# Patient Record
Sex: FEMALE | Race: BLACK OR AFRICAN AMERICAN | NOT HISPANIC OR LATINO | Employment: UNEMPLOYED | URBAN - METROPOLITAN AREA
[De-identification: names, ages, dates, MRNs, and addresses within clinical notes are randomized per-mention and may not be internally consistent; named-entity substitution may affect disease eponyms.]

---

## 2020-01-17 LAB
ABO + RH BLD: NORMAL
C TRACH RRNA SPEC QL PROBE: POSITIVE
N GONORRHOEAE, AMPLIFIED DNA: POSITIVE

## 2020-02-04 LAB
HCT VFR BLD AUTO: 37 % (ref 36–46)
HGB BLD-MCNC: 12.2 G/DL (ref 12–16)
MCV RBC AUTO: 78.2 FL (ref 82–108)
PLATELET # BLD AUTO: 78 K/?L (ref 150–399)

## 2020-02-05 LAB
HBV SURFACE AG SERPL QL IA: NEGATIVE
HEMOGLOBIN BANDS: NORMAL
HIV 1+2 AB+HIV1 P24 AG SERPL QL IA: NEGATIVE
RPR: NORMAL
RUBELLA IMMUNE STATUS: NORMAL

## 2020-02-18 DIAGNOSIS — O30.001 TWIN GESTATION IN FIRST TRIMESTER, UNSPECIFIED MULTIPLE GESTATION TYPE: Primary | ICD-10-CM

## 2020-03-03 ENCOUNTER — INITIAL CONSULT (OUTPATIENT)
Dept: MATERNAL FETAL MEDICINE | Facility: CLINIC | Age: 25
End: 2020-03-03
Payer: MEDICAID

## 2020-03-03 ENCOUNTER — PROCEDURE VISIT (OUTPATIENT)
Dept: MATERNAL FETAL MEDICINE | Facility: CLINIC | Age: 25
End: 2020-03-03
Payer: MEDICAID

## 2020-03-03 VITALS — SYSTOLIC BLOOD PRESSURE: 90 MMHG | WEIGHT: 92.56 LBS | DIASTOLIC BLOOD PRESSURE: 52 MMHG

## 2020-03-03 DIAGNOSIS — Z20.828 EXPOSURE TO THE FLU: Primary | ICD-10-CM

## 2020-03-03 DIAGNOSIS — Z36.82 ENCOUNTER FOR NUCHAL TRANSLUCENCY TESTING: ICD-10-CM

## 2020-03-03 DIAGNOSIS — O30.041 DICHORIONIC DIAMNIOTIC TWIN PREGNANCY IN FIRST TRIMESTER: ICD-10-CM

## 2020-03-03 DIAGNOSIS — O30.001 TWIN GESTATION IN FIRST TRIMESTER, UNSPECIFIED MULTIPLE GESTATION TYPE: ICD-10-CM

## 2020-03-03 PROCEDURE — 99212 OFFICE O/P EST SF 10 MIN: CPT | Mod: PBBFAC,TH | Performed by: OBSTETRICS & GYNECOLOGY

## 2020-03-03 PROCEDURE — 76802 PR US, OB <14WKS, TRANSABD, EA ADDL GESTATION: ICD-10-PCS | Mod: 26,S$PBB,, | Performed by: OBSTETRICS & GYNECOLOGY

## 2020-03-03 PROCEDURE — 99499 UNLISTED E&M SERVICE: CPT | Mod: S$PBB,,, | Performed by: OBSTETRICS & GYNECOLOGY

## 2020-03-03 PROCEDURE — 99999 PR PBB SHADOW E&M-EST. PATIENT-LVL II: ICD-10-PCS | Mod: PBBFAC,,, | Performed by: OBSTETRICS & GYNECOLOGY

## 2020-03-03 PROCEDURE — 99999 PR PBB SHADOW E&M-EST. PATIENT-LVL II: CPT | Mod: PBBFAC,,, | Performed by: OBSTETRICS & GYNECOLOGY

## 2020-03-03 PROCEDURE — 76801 OB US < 14 WKS SINGLE FETUS: CPT | Mod: PBBFAC | Performed by: OBSTETRICS & GYNECOLOGY

## 2020-03-03 PROCEDURE — 76802 OB US < 14 WKS ADDL FETUS: CPT | Mod: PBBFAC | Performed by: OBSTETRICS & GYNECOLOGY

## 2020-03-03 PROCEDURE — 76801 OB US < 14 WKS SINGLE FETUS: CPT | Mod: 26,S$PBB,, | Performed by: OBSTETRICS & GYNECOLOGY

## 2020-03-03 PROCEDURE — 76801 PR US, OB <14WKS, TRANSABD, SINGLE GESTATION: ICD-10-PCS | Mod: 26,S$PBB,, | Performed by: OBSTETRICS & GYNECOLOGY

## 2020-03-03 PROCEDURE — 76802 OB US < 14 WKS ADDL FETUS: CPT | Mod: 26,S$PBB,, | Performed by: OBSTETRICS & GYNECOLOGY

## 2020-03-03 PROCEDURE — 99499 NO LOS: ICD-10-PCS | Mod: S$PBB,,, | Performed by: OBSTETRICS & GYNECOLOGY

## 2020-03-03 RX ORDER — OSELTAMIVIR PHOSPHATE 75 MG/1
75 CAPSULE ORAL DAILY
Qty: 10 CAPSULE | Refills: 0 | Status: SHIPPED | OUTPATIENT
Start: 2020-03-03 | End: 2020-03-13

## 2020-03-03 NOTE — LETTER
March 4, 2020      Bryson Reid NP  [address]  [address]  [facility] [address]           [facility] [address]  [address]  [facility] [address]  Phone: [phone]          Patient: Fany May   MR Number: [id number]   YOB: 1995   Date of Visit: 3/3/2020       Dear Bryson Reid:    Thank you for referring Fany May to me for evaluation. Attached you will find relevant portions of my assessment and plan of care.    If you have questions, please do not hesitate to call me. I look forward to following Fany May along with you.    Sincerely,    Bharti Seaman MD    Enclosure  CC:  No Recipients    If you would like to receive this communication electronically, please contact [email or web address] or [phone] to request more information on [employer] access.    For providers and/or their staff who would like to refer a patient to [facility], please contact us through our one-stop-shop provider referral line, [facility] , at [phone].    If you feel you have received this communication in error or would no longer like to receive these types of communications, please e-mail [email or web address]

## 2020-03-18 ENCOUNTER — PROCEDURE VISIT (OUTPATIENT)
Dept: MATERNAL FETAL MEDICINE | Facility: CLINIC | Age: 25
End: 2020-03-18
Payer: MEDICAID

## 2020-03-18 ENCOUNTER — INITIAL CONSULT (OUTPATIENT)
Dept: MATERNAL FETAL MEDICINE | Facility: CLINIC | Age: 25
End: 2020-03-18
Payer: MEDICAID

## 2020-03-18 VITALS
BODY MASS INDEX: 18.48 KG/M2 | HEIGHT: 60 IN | SYSTOLIC BLOOD PRESSURE: 102 MMHG | WEIGHT: 94.13 LBS | DIASTOLIC BLOOD PRESSURE: 60 MMHG

## 2020-03-18 DIAGNOSIS — O30.041 DICHORIONIC DIAMNIOTIC TWIN PREGNANCY IN FIRST TRIMESTER: ICD-10-CM

## 2020-03-18 DIAGNOSIS — Z36.82 ENCOUNTER FOR NUCHAL TRANSLUCENCY TESTING: ICD-10-CM

## 2020-03-18 DIAGNOSIS — Z36.89 ENCOUNTER FOR FETAL ANATOMIC SURVEY: Primary | ICD-10-CM

## 2020-03-18 PROCEDURE — 76815 OB US LIMITED FETUS(S): CPT | Mod: 26,S$PBB,, | Performed by: OBSTETRICS & GYNECOLOGY

## 2020-03-18 PROCEDURE — 99204 OFFICE O/P NEW MOD 45 MIN: CPT | Mod: 25,S$PBB,TH, | Performed by: OBSTETRICS & GYNECOLOGY

## 2020-03-18 PROCEDURE — 76815 PR  US,PREGNANT UTERUS,LIMITED, 1/> FETUSES: ICD-10-PCS | Mod: 26,S$PBB,, | Performed by: OBSTETRICS & GYNECOLOGY

## 2020-03-18 PROCEDURE — 99212 OFFICE O/P EST SF 10 MIN: CPT | Mod: PBBFAC,TH,25 | Performed by: OBSTETRICS & GYNECOLOGY

## 2020-03-18 PROCEDURE — 99999 PR PBB SHADOW E&M-EST. PATIENT-LVL II: ICD-10-PCS | Mod: PBBFAC,,, | Performed by: OBSTETRICS & GYNECOLOGY

## 2020-03-18 PROCEDURE — 99999 PR PBB SHADOW E&M-EST. PATIENT-LVL II: CPT | Mod: PBBFAC,,, | Performed by: OBSTETRICS & GYNECOLOGY

## 2020-03-18 PROCEDURE — 99204 PR OFFICE/OUTPT VISIT, NEW, LEVL IV, 45-59 MIN: ICD-10-PCS | Mod: 25,S$PBB,TH, | Performed by: OBSTETRICS & GYNECOLOGY

## 2020-03-18 PROCEDURE — 76815 OB US LIMITED FETUS(S): CPT | Mod: PBBFAC | Performed by: OBSTETRICS & GYNECOLOGY

## 2020-03-18 NOTE — PROGRESS NOTES
Indication  ========    FHTs : Di/Di twins    History  ======    Previous Outcomes   2  Para 1  Preg. no. 1  Outcome: live birth  Date: 18  Gest. age 39 w + 0 d  Gender: female  Details:   Risk Factors  Details: CMV Igg positive  Details: Sickle cell trait positive    Maternal Assessment  =================    BP syst 102 mmHg  BP diast 60 mmHg    Method  ======    Transabdominal ultrasound examination, [place] E10. View: Sufficient    Pregnancy  =========    Twin pregnancy. Dichorionic-diamniotic. Number of fetuses: 2    Dating  ======    GA by prior assessment 13 w + 3 d  ELLIOT by prior assessment: 2020  Assigned: based on stated ELLIOT, selected on 03/3/2020  Assigned GA 13 w + 3 d  Assigned ELLIOT: 2020  Pregnancy length 280 d    General Evaluation (Fetus 1)  =====================    Cardiac activity present.  bpm.  Fetal movements visualized.  Placenta anterior.    Fetal Anatomy (Fetus 1)  ==================    Cranium: normal  Stomach: visualized  Legs: both visualized    General Evaluation (Fetus 2)  =====================    Cardiac activity present.  bpm.  Fetal movements visualized.  Placenta posterior.    Fetal Anatomy (Fetus 2)  ==================    Cranium: normal  Stomach: visualized  Legs: both visualized    Consultation  ==========    Type: Twin gestation  The patient is a 24-year-old soon to be 25-year-old  001 who is currently at 13 weeks and 3 days gestation based on a first-trimester  ultrasound who presents for consultation due to di/di twins. The patient was initially supposed to be seen for consultation a few weeks ago but  her child had the flu. The patient indicates her child has recovered and she has no symptoms. She took Tamiflu per Dr. Seaman's note.    Past medical history: Questions sickle cell trait, need to verify no thalassemia  past surgical history: She reports none  no known drug allergies  medications: Prenatal vitamins  social history: Denies  smoking, alcohol, illicit drug use  family history of birth defects: None, she reports the father of the baby is reportedly negative for sickle cell trait but no records were provided,  she does not believe he has been tested, she reports her father had sickle cell disease and  many years ago, she does not report any  personal history of sickle cell disease or sickle cell thalassemia, she reported a transfusion with a postpartum hemorrhage with her last  delivery  past OB history: Full-term vaginal delivery 8 lb 12 oz, she reports she had pneumonia with blood loss and blood pressures were high at the end  of her pregnancy no records were provided the time of consultation regarding this; Patient indicates she was told she should have had a   with her prior delivery.      Hgb 11.9, plt 200, sickle cell screen positive, electrophoresis reports normal hgb electrophoretic pattern but elevated hgba2 an elevated hgba2  can be seen in thalassemia but the actual report shows hgb S  cr 0.64, ast 12, alt 14  A1c 5.2  varicella non-immune  low vitamin D  cmv igg positive    A/P:  1. IUP at 13 and 3    2. Di/Di twin gestation: :I counseled the patient with the risks associated with twin pregnancy. These include but are not limited to   labor and  delivery,gestational diabetes, preeclampsia, IUGR and/or discordant twins, malpresentation, congenital anomalies, postpartum hemorrhage,  demise, and  delivery. The patient voiced understanding of these risks. We discussed that a twin gestation increases the risk of  nearly all  pregnancy related complications. There is an increased risk of cerebral palsy in multiple gestations. A detailed fetal anatomic  survey is recommended at 19 to 20 weeks gestation. Subsequent to this, serial growth ultrasounds are recommended every 4-6 weeks  provided  fetal growth remains normal.  Weekly  fetal surveillance is recommended at 32 weeks with either BPP or  NST/MVP through primary ob .If discordancy (difference  in  EFW's > 20%) or other complications develop, then more intense  fetal surveillance should be performed.  Given the multiple gestation, I would recommend folic acid supplementation. Recommend at least an additional 1mg daily and primary ob  may  wish to consider a dosage of 4mg total daily.  To reduce the risk of preeclampsia, I would recommend baby aspirin 81mg PO daily after 12-14 weeks of gestation.  Delivery at 38 weeks or earlier if indicated.    Genetic screening in twins: Aneuploidy risks reviewed. If the twins are dizygous that her age related risk is the risk for each fetus but that the  total aneuploidy risk for the mother is higher due to the multiple fetuses. Monozygous twins  most often have the same chromosomal complement and the risk of carrying aneuploid fetuses is similar to the age risk of the mother. Twins  of  discordant gender are typically dizygous. We discussed the option of sequential screening. We reviewed second trimester screening  detecting  about 50% of cases of Down syndrome in twins. The patient was also informed of the options of diagnostic testing for aneuploidy via CVS  and  amniocentesis and the risks associated with this in twins vs in collazo pregnancies. We discussed that ACOG does not recommend that  NIPT be performed on twins given the limited data. The patient currently declines aneuploidy screening or CVS or amniocentesis. She  indicated she will review this again with her primary ob.    patient advised of increased risk of adverse pregnancy outcomes with low pre pregnancy weight. Recommend primary OB monitor making  appropriately.    Also noted:    3. The patient reportedly has sickle cell trait. On her hemoglobin electrophoresis it appeared that she did have hemoglobin S that is usually  consistent with sickle cell trait. Her sickle cell screen was positive but the electrophoresis reports a normal  hemoglobin electrophoresis pattern  with elevated hgba2 and can be seen in thalassemia trait. I attempted to contact the lab where the referring physicians sent the  electrophoresis. I was unable to get anyone to assist me via the phone but they said that they would address this and will send a corrected  report to the referring physician. I would recommend referring physician verify if the corrected hemoglobin electrophoresis says that she has  sickle cell trait along with beta thalassemia or if they can not believe it sickle cell trait alone. I had counseled the patient about sickle cell trait  as she reports that she was diagnosed with this in the past. She does not believe that the father of the baby has sickle cell trait but we have no  documentation of this. The patient is aware that if she is sickle cell trait positive that each of her children have a 50% risk of having sickle cell  trait. If the father of the baby also has sickle cell trait along with the patient, each offspring would have a 25% risk of sickle cell disease. If the  patient has sickle cell beta thalassemia that alters her counseling risk and also recommendations for pregnancy. I offered the patient the option  of having a hemoglobin electrophoresis through the [facility] system today and she declined. If the primary obstetrician is unable to obtain a  corrected electrophoresis report, I recommend that the primary OB send the hemoglobin electrophoresis to another laboratory. Please refer  back for MFM consultation if the patient has evidence of sickle cell beta thalassemia as this alters the counseling as well as the  recommendations for the pregnancy and send a copy of the report. With sickle cell trait, urine cultures should be checked every trimester by  the primary obstetrician. The primary OB should notify the pediatricians at delivery of the patient's hemoglobinopathy status so as to inform  them of the need for testing of the neonates. The  patient also indicated that her father had sickle cell disease but he passed away. She does  not recall any sickle cell thalassemia in the family but will discuss this with her family. She did not report any pain crises herself. She did  report having a blood transfusion with her prior delivery due to postpartum bleeding. Primary OB should offer the father of the baby hemoglobin  electrophoresis. The patient declines diagnostic testing for the pregnancy via CVS or amniocentesis to assess for sickle cell disease.    4. Primary OB should address vitamin-D levels. Primary OB check CMV IgG for unclear reasons. Recommend primary OB consider checking  CMV IgM if there are any concerns of exposure. Primary OB should address chlamydia and appropriate retesting. Patient discussed delivery  planning with her primary OB.    Addendum: The patient did report that she had high blood pressure at the end of her last pregnancy. Recommend primary OB check a set of  baseline preeclampsia labs (done) as well as a baseline urine protein to creatinine ratio. I did not have any records of her pregnancy at the time  of her consultation and consultation was not requested regarding her prior pregnancy. Limited records were able to be found post visit. Primary  ob should obtain detailed records. The patient is discussing route of delivery with her primary obstetricians. After her visit, I was able to find  some information in Care everywhere that documented that the patient had hypoxia with pneumonia after delivery and also had a cervical  laceration with repair and blood transfusion (4 units with 2 units FFP due to cervical laceration) and thrombocytopenia and anemia. She had an  increased creatinine which resolved. There is no documentation of a hemoglobin electrophoresis. They did not feel this was cardiogenic her left  atrium was slightly enlarged but her EF was normal and her PA pressure was normal. She was placed on antibiotics and  treated. Recommend  primary OB review her records and if there are any questions ask MFM to rediscuss this as this not requested the initial consultation and  these were not available at the time of her consultation. Records also indicated childhood asthma, the patient did not reveal this. The patient  did not indicate any issues with aspirin and I verified this with her via phone. Primary ob should also repeat maternal echo to confirm this is  normal. in her records there is no documentation of the sickle cell status that I could find. Primary OB also need to address delivery route given  the cervical laceration in which she reports was a weight of her primary prior child. we do not have exact details of the laceration or a copy of  the note of repair but if the laceration was extensive the primary OB should discuss  section with patient.    45 min was spent face-to-face time with greater than half that time spent counseling and coordination of care.    Impression  =========    Live dichorionic/diamniotic twin intrauterine pregnancy.  Limited ultrasound to verify cardiac activity.  Cardiac activity noted for both fetuses.    Recommendation  ==============    Follow up ultrasound in 6 weeks for fetal anatomic survey (patient aware cervical length is done at that time).  Recommend primary ob confirm that lab corrects hgb electrophoresis results and verify if there are any concerns regarding thalassemia. If  unable to get amended report, recommend primary ob send the electrophoresis to another lab. Patient declined a repeat electrophoresis at  [facility].  Recommend primary ob reconsult MFM if any concerns of patient having more than sickle cell trait alone after obtaining corrected testing or  repeat results or if any concerns on prior delivery records. If patient has concurrent thalassemia, additional recommendations would be  warranted.  Primary ob should offer FOB hgb electrophoresis.  Baby asa daily and  folic acid.  Serial growth ultrasounds.  Primary ob should do urine cultures every trimester.  Primary ob to rediscuss genetic screening for aneuploidy-patient declines currently.  3rd trimester  fetal surveillance.  Delivery at 38 weeks or earlier if indicated.  Recommend primary ob see note above regarding prenatal care issues to address and addendum above for recommendations as well-primary  ob should obtain prior pregnancy records and also check maternal echo. primary ob to address delivery route.  See note above.

## 2020-04-30 ENCOUNTER — PROCEDURE VISIT (OUTPATIENT)
Dept: MATERNAL FETAL MEDICINE | Facility: CLINIC | Age: 25
End: 2020-04-30
Payer: MEDICAID

## 2020-04-30 ENCOUNTER — INITIAL CONSULT (OUTPATIENT)
Dept: MATERNAL FETAL MEDICINE | Facility: CLINIC | Age: 25
End: 2020-04-30
Payer: MEDICAID

## 2020-04-30 VITALS
HEIGHT: 60 IN | SYSTOLIC BLOOD PRESSURE: 98 MMHG | WEIGHT: 103.19 LBS | BODY MASS INDEX: 20.26 KG/M2 | DIASTOLIC BLOOD PRESSURE: 58 MMHG

## 2020-04-30 DIAGNOSIS — Z36.89 ENCOUNTER FOR FETAL ANATOMIC SURVEY: ICD-10-CM

## 2020-04-30 DIAGNOSIS — O99.019 SICKLE CELL TRAIT IN MOTHER AFFECTING PREGNANCY: ICD-10-CM

## 2020-04-30 DIAGNOSIS — Z36.89 ENCOUNTER FOR ULTRASOUND TO ASSESS FETAL GROWTH: Primary | ICD-10-CM

## 2020-04-30 DIAGNOSIS — O30.042 DICHORIONIC DIAMNIOTIC TWIN PREGNANCY IN SECOND TRIMESTER: Primary | ICD-10-CM

## 2020-04-30 DIAGNOSIS — D57.3 SICKLE CELL TRAIT IN MOTHER AFFECTING PREGNANCY: ICD-10-CM

## 2020-04-30 DIAGNOSIS — D57.3 SICKLE CELL TRAIT: Primary | ICD-10-CM

## 2020-04-30 LAB — QUAD SCREEN: NEGATIVE

## 2020-04-30 PROCEDURE — 99213 PR OFFICE/OUTPT VISIT, EST, LEVL III, 20-29 MIN: ICD-10-PCS | Mod: S$PBB,TH,25, | Performed by: OBSTETRICS & GYNECOLOGY

## 2020-04-30 PROCEDURE — 99213 OFFICE O/P EST LOW 20 MIN: CPT | Mod: PBBFAC,TH | Performed by: OBSTETRICS & GYNECOLOGY

## 2020-04-30 PROCEDURE — 99999 PR PBB SHADOW E&M-EST. PATIENT-LVL III: ICD-10-PCS | Mod: PBBFAC,,, | Performed by: OBSTETRICS & GYNECOLOGY

## 2020-04-30 PROCEDURE — 99999 PR PBB SHADOW E&M-EST. PATIENT-LVL III: CPT | Mod: PBBFAC,,, | Performed by: OBSTETRICS & GYNECOLOGY

## 2020-04-30 PROCEDURE — 76812 OB US DETAILED ADDL FETUS: CPT | Mod: 26,S$PBB,, | Performed by: OBSTETRICS & GYNECOLOGY

## 2020-04-30 PROCEDURE — 76812 OB US DETAILED ADDL FETUS: CPT | Mod: PBBFAC | Performed by: OBSTETRICS & GYNECOLOGY

## 2020-04-30 PROCEDURE — 76811 OB US DETAILED SNGL FETUS: CPT | Mod: PBBFAC | Performed by: OBSTETRICS & GYNECOLOGY

## 2020-04-30 PROCEDURE — 76811 PR US, OB FETAL EVAL & EXAM, TRANSABDOM,FIRST GESTATION: ICD-10-PCS | Mod: 26,S$PBB,, | Performed by: OBSTETRICS & GYNECOLOGY

## 2020-04-30 PROCEDURE — 76812 OB US DETAILED ADDL FETUS: ICD-10-PCS | Mod: 26,S$PBB,, | Performed by: OBSTETRICS & GYNECOLOGY

## 2020-04-30 PROCEDURE — 99213 OFFICE O/P EST LOW 20 MIN: CPT | Mod: S$PBB,TH,25, | Performed by: OBSTETRICS & GYNECOLOGY

## 2020-04-30 PROCEDURE — 76811 OB US DETAILED SNGL FETUS: CPT | Mod: 26,S$PBB,, | Performed by: OBSTETRICS & GYNECOLOGY

## 2020-04-30 NOTE — PROGRESS NOTES
Please see imaging tab for Viewpoint study performed today      Pt here for targeted US and Follow up MD visit  Primary OB:   [place]   Pt here for FU visit.  Targeted US performed.  Time spent with pt: 15 mins, >50% spent in counseling alone re US findings, PTL precs, weight gain, and Hgb electrophoresis.      1.   Di/Di twin gestation: :  Previously counseled   Aneuploidy screening discussed   Folic acid and LDA recommended   Targeted ultrasound today, serial ultrasounds for growth monthly thereafter    testing at 32 weeks   Mode of delivery at Primary OB discretion   Cervical length assessed today  And normal        2.  Sickle trait :  Previously counseled re sickle trait.  UC recommended q trimester.  Inheritance pattern previously discussed and pt counseled re potential for infant to have sickle trait/disease depending upon status of the FOB.   testing was reviewed.  Hgb electrophoresis was recommended to confirm no evidence of Sickle-B thalassemia.   Review of records and contact of Primary's office reveals that a hgb electrophoresis was not repeated.   Patient previously declined CVS or amniocentesis to determine fetal sickle status.  . The patient reportedly has sickle cell trait and she believes the FOB is negative . On her hemoglobin electrophoresis it appeared that she did have hemoglobin S that is usually  consistent with sickle cell trait.     3.  Vit D:  Level check and supplementation recommended if needed.  Primary to address.     4.  History of PP HTN:  Baseline labs recommended with CMP, CBC and PC ratio    5.  Prior PPH secondary to extensive cervical laceration:  Primary aware and to determine route of delivery.  Generally, patients who have had a cervical laceration can delivery vaginally in subsequent pregnancies. Mode of delivery will depend upon presentation of twins and other obstetric factors as well.     Recommendations:    Targeted US performed today  Office called and a  repeat HgbE was not done; one was ordered and the pt went to lab today --will forward to primary   Primay MD to prescribe folic acid and LDA  UC q TM   Genetic screening per Primary MD   Recommend ultrasound q 4 weeks to follow fetal growth -- a return appointment was scheduled today   Delivery at 38 weeks, unless indicated earlier   Shirin Barbour MD

## 2020-05-28 ENCOUNTER — PROCEDURE VISIT (OUTPATIENT)
Dept: MATERNAL FETAL MEDICINE | Facility: CLINIC | Age: 25
End: 2020-05-28
Payer: MEDICAID

## 2020-05-28 DIAGNOSIS — O30.042 DICHORIONIC DIAMNIOTIC TWIN PREGNANCY IN SECOND TRIMESTER: ICD-10-CM

## 2020-05-28 DIAGNOSIS — Z36.89 ENCOUNTER FOR ULTRASOUND TO ASSESS FETAL GROWTH: ICD-10-CM

## 2020-05-28 PROCEDURE — 76816 OB US FOLLOW-UP PER FETUS: CPT | Mod: 59,PBBFAC | Performed by: OBSTETRICS & GYNECOLOGY

## 2020-05-28 PROCEDURE — 76816 PR  US,PREGNANT UTERUS,F/U,TRANSABD APP: ICD-10-PCS | Mod: 26,S$PBB,, | Performed by: OBSTETRICS & GYNECOLOGY

## 2020-05-28 PROCEDURE — 76816 OB US FOLLOW-UP PER FETUS: CPT | Mod: 26,S$PBB,, | Performed by: OBSTETRICS & GYNECOLOGY

## 2020-06-07 ENCOUNTER — HOSPITAL ENCOUNTER (EMERGENCY)
Facility: OTHER | Age: 25
Discharge: HOME | End: 2020-06-07
Attending: OBSTETRICS & GYNECOLOGY
Payer: MEDICAID

## 2020-06-07 VITALS
HEART RATE: 89 BPM | RESPIRATION RATE: 16 BRPM | TEMPERATURE: 99 F | OXYGEN SATURATION: 98 % | DIASTOLIC BLOOD PRESSURE: 59 MMHG | SYSTOLIC BLOOD PRESSURE: 98 MMHG

## 2020-06-07 DIAGNOSIS — Z3A.25 25 WEEKS GESTATION OF PREGNANCY: Primary | ICD-10-CM

## 2020-06-07 DIAGNOSIS — O26.899 PELVIC PRESSURE IN PREGNANCY: ICD-10-CM

## 2020-06-07 DIAGNOSIS — R10.2 PELVIC PRESSURE IN PREGNANCY: ICD-10-CM

## 2020-06-07 PROCEDURE — 99283 PR EMERGENCY DEPT VISIT,LEVEL III: ICD-10-PCS | Mod: 25,,, | Performed by: OBSTETRICS & GYNECOLOGY

## 2020-06-07 PROCEDURE — 99284 EMERGENCY DEPT VISIT MOD MDM: CPT | Mod: 25

## 2020-06-07 PROCEDURE — 99283 EMERGENCY DEPT VISIT LOW MDM: CPT | Mod: 25,,, | Performed by: OBSTETRICS & GYNECOLOGY

## 2020-06-07 PROCEDURE — 59025 PR FETAL 2N-STRESS TEST: ICD-10-PCS | Mod: 26,,, | Performed by: OBSTETRICS & GYNECOLOGY

## 2020-06-07 PROCEDURE — 59025 FETAL NON-STRESS TEST: CPT | Mod: 26,,, | Performed by: OBSTETRICS & GYNECOLOGY

## 2020-06-07 PROCEDURE — 59025 FETAL NON-STRESS TEST: CPT

## 2020-06-07 NOTE — ED PROVIDER NOTES
Encounter Date: 2020       History     Chief Complaint   Patient presents with    Other     lost mucus plug     HPI   Fany May is a 25 y.o. [id number] at 25w0d presents complaining of loss of some vaginal mucous and vaginal pressure.  She mostly is coming to make sure that her babies are okay.   This IUP is complicated by di-di twin gestation and SCT .  Patient denies contractions, denies vaginal bleeding, denies LOF.   Fetal Movement: normal.     Review of patient's allergies indicates:  No Known Allergies  Past Medical History:   Diagnosis Date    Sickle cell trait      No past surgical history on file.  No family history on file.  Social History     Tobacco Use    Smoking status: Never Smoker    Smokeless tobacco: Never Used   Substance Use Topics    Alcohol use: Not Currently    Drug use: Never     Review of Systems   Constitutional: Negative for fever.   HENT: Negative for sinus pain and sore throat.    Eyes: Negative for photophobia and visual disturbance.   Respiratory: Negative for chest tightness and shortness of breath.    Cardiovascular: Negative for chest pain and palpitations.   Gastrointestinal: Negative for abdominal distention, abdominal pain, nausea and vomiting.   Genitourinary: Positive for pelvic pain (pressure) and vaginal discharge (mucous). Negative for dysuria and vaginal bleeding.   Musculoskeletal: Negative for back pain.   Skin: Negative for rash.   Neurological: Negative for weakness and headaches.   Hematological: Does not bruise/bleed easily.       Physical Exam     Initial Vitals   BP Pulse Resp Temp SpO2   20 1625 20 1625 20 1625 20 1625 20 1630   (!) 98/59 85 16 98.9 °F (37.2 °C) 98 %      MAP       --                Physical Exam    Constitutional: She appears well-developed and well-nourished. She is not diaphoretic. No distress.   HENT:   Head: Normocephalic and atraumatic.   Eyes: EOM are normal.   Neck: Normal range of motion.    Cardiovascular: Normal rate.   Pulmonary/Chest: No respiratory distress.   Abdominal: Soft. Bowel sounds are normal. She exhibits no distension and no mass. There is no tenderness. There is no rebound and no guarding.   Musculoskeletal: Normal range of motion. She exhibits no edema or tenderness.   Neurological: She is alert and oriented to person, place, and time.   Skin: Skin is warm and dry. No rash noted.   Psychiatric: She has a normal mood and affect.     OB LABOR EXAM:   Pre-Term Labor: No.   Membranes ruptured: No.   Method: Sterile vaginal exam per MD and Sterile speculum exam per MD.   Vaginal Bleeding: none present.     Dilatation: 0.   Station: -5.   Effacement: 40%.   Amniotic Fluid Color: no fluid.     Comments: Minimal amount of physiologic discharge present. FFN collected but not sent due to closed cervix.        ED Course   Obtain Fetal nonstress test (NST)  Date/Time: 6/7/2020 5:00 PM  Performed by: Laquita Farias MD  Authorized by: Laquita Fairas MD     Nonstress Test:     Variability:  6-25 BPM    Decelerations:  None    Accelerations:  10 bpm    Baby A baseline heart rate (BPM): 135 baby A, 140 baby B.    Contractions:  Not present  Biophysical Profile:     Nonstress Test Interpretation: reactive      Overall Impression:  Reassuring      Labs Reviewed   FETAL FIBRONECTIN          Imaging Results    None          Medical Decision Making:   ED Management:  VSS  NST reactive and reassuring for both twin A and B  Patient complaining of some pelvic pressure but no contractions, no problems with urination or BM, and small amount of mucousy discharge. Just wants to make sure she is not dilating early.   SVE 0/40/-5  FFN collected but not sent to lab due to closed cervix  Overall reassuring clinical picture with closed cervix, no contractions, and pain just modest amount of pressure  Counseled patient on symptomatic relief and use of pregnancy belt  PTL precautions given  Discharged home in  stable condition  Other:   I have discussed this case with another health care provider.       <> Summary of the Discussion: Dr. Fernandez                                 Clinical Impression:       ICD-10-CM ICD-9-CM   1. 25 weeks gestation of pregnancy Z3A.25 V22.2   2. Pelvic pressure in pregnancy O26.899 646.83    R10.2 625.9             ED Disposition Condition    Discharge Stable        ED Prescriptions     None        Follow-up Information    None                                    Laquita Farias MD  Resident  06/07/20 [id number]

## 2020-06-07 NOTE — DISCHARGE INSTRUCTIONS
Call clinic [phone] or L & D after hours at [phone] for vaginal bleeding, leakage of fluids, contractions 4-5 in one hour, decreased fetal movements ( 10 movements in 2 hours), headache not relieved by Tylenol, blurry vision, or temp of 100.4 or greater.  Begin doing fetal kick counts, at least 10 movements in 2 hours starting at 28 weeks gestation.  Keep next clinic appointment

## 2020-06-11 LAB
GLUCOSE SERPL-MCNC: 153 MG/DL
HCT VFR BLD AUTO: 26 % (ref 36–46)
HGB BLD-MCNC: 8.8 G/DL (ref 12–16)
PLATELET # BLD AUTO: 220 K/?L (ref 150–399)

## 2020-06-25 ENCOUNTER — PROCEDURE VISIT (OUTPATIENT)
Dept: MATERNAL FETAL MEDICINE | Facility: CLINIC | Age: 25
End: 2020-06-25
Payer: MEDICAID

## 2020-06-25 DIAGNOSIS — O30.042 DICHORIONIC DIAMNIOTIC TWIN PREGNANCY IN SECOND TRIMESTER: ICD-10-CM

## 2020-06-25 DIAGNOSIS — Z36.89 ENCOUNTER FOR ULTRASOUND TO ASSESS FETAL GROWTH: ICD-10-CM

## 2020-06-25 PROCEDURE — 76816 OB US FOLLOW-UP PER FETUS: CPT | Mod: 26,S$PBB,, | Performed by: OBSTETRICS & GYNECOLOGY

## 2020-06-25 PROCEDURE — 76816 OB US FOLLOW-UP PER FETUS: CPT | Mod: PBBFAC | Performed by: OBSTETRICS & GYNECOLOGY

## 2020-06-25 PROCEDURE — 76816 PR  US,PREGNANT UTERUS,F/U,TRANSABD APP: ICD-10-PCS | Mod: 26,S$PBB,, | Performed by: OBSTETRICS & GYNECOLOGY

## 2020-07-06 ENCOUNTER — INITIAL PRENATAL (OUTPATIENT)
Dept: OBSTETRICS AND GYNECOLOGY | Facility: CLINIC | Age: 25
End: 2020-07-06
Payer: MEDICAID

## 2020-07-06 VITALS — SYSTOLIC BLOOD PRESSURE: 90 MMHG | WEIGHT: 110.88 LBS | BODY MASS INDEX: 21.66 KG/M2 | DIASTOLIC BLOOD PRESSURE: 60 MMHG

## 2020-07-06 DIAGNOSIS — O09.293 PREGNANCY WITH POOR REPRODUCTIVE HISTORY IN THIRD TRIMESTER: Primary | ICD-10-CM

## 2020-07-06 DIAGNOSIS — O30.043 DICHORIONIC DIAMNIOTIC TWIN PREGNANCY IN THIRD TRIMESTER: ICD-10-CM

## 2020-07-06 DIAGNOSIS — Z30.09 ENCOUNTER FOR COUNSELING REGARDING CONTRACEPTION: ICD-10-CM

## 2020-07-06 DIAGNOSIS — O99.019 SICKLE CELL TRAIT IN MOTHER AFFECTING PREGNANCY: ICD-10-CM

## 2020-07-06 DIAGNOSIS — Z23 ENCOUNTER FOR IMMUNIZATION: ICD-10-CM

## 2020-07-06 DIAGNOSIS — D57.3 SICKLE CELL TRAIT IN MOTHER AFFECTING PREGNANCY: ICD-10-CM

## 2020-07-06 PROBLEM — Z30.014 ENCOUNTER FOR INITIAL PRESCRIPTION OF INTRAUTERINE CONTRACEPTIVE DEVICE (IUD): Status: ACTIVE | Noted: 2020-07-06

## 2020-07-06 PROBLEM — O09.299 HISTORY OF MACROSOMIA IN INFANT IN PRIOR PREGNANCY, CURRENTLY PREGNANT: Status: ACTIVE | Noted: 2020-03-03

## 2020-07-06 PROBLEM — O30.001 TWIN GESTATION IN FIRST TRIMESTER: Status: ACTIVE | Noted: 2020-03-03

## 2020-07-06 PROBLEM — O30.049 DICHORIONIC DIAMNIOTIC TWIN PREGNANCY, ANTEPARTUM: Status: ACTIVE | Noted: 2020-03-03

## 2020-07-06 PROCEDURE — 90471 IMMUNIZATION ADMIN: CPT | Mod: PBBFAC,PO

## 2020-07-06 PROCEDURE — 99214 OFFICE O/P EST MOD 30 MIN: CPT | Mod: TH,S$PBB,, | Performed by: OBSTETRICS & GYNECOLOGY

## 2020-07-06 PROCEDURE — 99999 PR PBB SHADOW E&M-EST. PATIENT-LVL II: ICD-10-PCS | Mod: PBBFAC,,, | Performed by: OBSTETRICS & GYNECOLOGY

## 2020-07-06 PROCEDURE — 99999 PR PBB SHADOW E&M-EST. PATIENT-LVL II: CPT | Mod: PBBFAC,,, | Performed by: OBSTETRICS & GYNECOLOGY

## 2020-07-06 PROCEDURE — 99212 OFFICE O/P EST SF 10 MIN: CPT | Mod: PBBFAC,TH,PO | Performed by: OBSTETRICS & GYNECOLOGY

## 2020-07-06 PROCEDURE — 87086 URINE CULTURE/COLONY COUNT: CPT

## 2020-07-06 PROCEDURE — 99214 PR OFFICE/OUTPT VISIT, EST, LEVL IV, 30-39 MIN: ICD-10-PCS | Mod: TH,S$PBB,, | Performed by: OBSTETRICS & GYNECOLOGY

## 2020-07-06 NOTE — PROGRESS NOTES
"Reason for visit: Initial Prenatal Visit    HPI:   25 y.o., at 29w1d by Estimated Date of Delivery: 20  Patient of Shey Reid, here to establish care. Di-di twin pregnancy folled by MFM at [facility]. Concordant growth, with most recen tUS , both fetus A and B at 24th percentile with normal MVPs.   Pregnancy complicated by SC Trait as well as h/o PP Htn, and cervical laceration with need for transfusion of pRBC after last delivery.   Patient reports last baby weights "almost 9 pounds" but record review shows birth weight 7 lb 13 oz.    ROS:  OBSTETRICS  - Contractions: occasional last week, none now  - Bleeding: no  - Loss of fluid: no  - Fetal movement: normal  GASTRO  - Nausea: no  - Vomiting: no  NEURO  - Headache: no  - Vision changes: no    Past medical, surgical, social, and family, and history: Reviewed and updated in EMR.  Medications: Reviewed and updated in EMR.  Allergies: Patient has no known allergies.     OB History    Para Term  AB Living   2 1 1 0 0 1   SAB TAB Ectopic Multiple Live Births   0 0 0   1      # Outcome Date GA Lbr Vishal/2nd Weight Sex Delivery Anes PTL Lv   2 Current            1 Term 18 39w6d 04:57 / 02:06 3.555 kg (7 lb 13.4 oz) F Vag-Spont EPI N SHEREE      Complications: Cervical laceration     Pregnancy dating, labs, ultrasound reports, prenatal testing, and problem list: Reviewed and updated in EMR.    Pregnancy 2 Problems (from 20 to present)     Problem Noted Resolved    Pregnancy with poor reproductive history, antepartum 3/3/2020 by JANIE Castillo MD No    Priority:  1 - High      Overview Addendum 2020 11:23 AM by JANIE Castillo MD     G1 -  with cervical laceration resulting in PPH requiring transfusion of 4 units pRBC.  Admitted to ICU on PPD#0 for hypoxia and possible pneumonia.    Labs - Glucose screen normal per patient.  Dating - By LMP consistent with MFM u/s at 11 weeks.  U/S - Normal anatomy.  Aneuploidy screening - " Quad=(-).  Vaccines - 7/6/2020: Tdap.  Contraception - Desires immediate post-placental Mirena.  Pap - 2/5/2020: Normal per prenatal record.         Dichorionic diamniotic twin pregnancy, antepartum 3/3/2020 by JANIE Castillo MD No    Priority:  2       Overview Signed 7/6/2020 10:43 AM by JANIE Castillo MD     - 6/25/2020: v/v, 24%ile/24%ile, discordance=0.9%  - 7/23/2020: repeat u/s scheduled.         Sickle cell trait 3/3/2020 by JANIE Castillo MD No    Priority:  3       Desires immediate post-placental Mirena 7/6/2020 by JANIE Castillo MD No    Priority:  4               Vitals: BP 90/60   Wt 50.3 kg (110 lb 14.3 oz)   BMI 21.66 kg/m²     Physical exam:  GENERAL: No acute distress. Very thin.   HEENT: Normocephalic  NEURO: Alert and oriented x3  PSYCH: Normal mood and affect  PULMONARY: Non-labored respiration; no tachypnea  ABD: Soft, gravid, nontender; no hernia or hepatosplenomegaly    Assessment and Plan:    1. Pregnancy with poor reproductive history in third trimester  CBC Without Differential    Hemoglobin Electrophoresis,Hgb A2 Benedicto.    Ob Cervical Screen    ABO/Rh    HIV 1/2 Ag/Ab (4th Gen)    RPR    Rubella Antibody, IgG    Hbsag - Prenatal    Maternal Quad Screen   2. Dichorionic diamniotic twin pregnancy in third trimester     3. Sickle cell trait in mother affecting pregnancy  Urine culture   4. Encounter for counseling regarding contraception     5. Encounter for immunization  (In Office Administered) Tdap Vaccine         Dichorionic diamniotic twin pregnancy in third trimester  - Neg quad screen  - Serial Growth US with MFM - last 5/28, next 6/26. Patient aware  - TDaP today  - Passed Glucola     H/O maternal cervical laceration, currently pregnant  - Repaired in the OR immediately PP. Required several units pRBCs  - Cervical length on MF US WNL at first scan    History of postpartum hypertension  - Patient with poor memory of events surrounding last birth due to trauma of  cervical laceration  - Baseline labs were obtained early in pregnancy  - Was not started on ASA by Shey Reid    Sickle cell trait in mother affecting pregnancy  - Urine culture q trimester per MFM  - Ordered today    Encounter for counseling regarding contraception  - Desires immediate PP Mirena     labor and PreE precautions given  Follow-up: 2 weeks      Seen and examined.  Agree with note.  All questions answered.  -- JANIE Castillo M.D.

## 2020-07-07 LAB — BACTERIA UR CULT: NO GROWTH

## 2020-07-16 ENCOUNTER — TELEPHONE (OUTPATIENT)
Dept: MATERNAL FETAL MEDICINE | Facility: CLINIC | Age: 25
End: 2020-07-16

## 2020-07-16 ENCOUNTER — HOSPITAL ENCOUNTER (EMERGENCY)
Facility: OTHER | Age: 25
Discharge: HOME | End: 2020-07-16
Attending: OBSTETRICS & GYNECOLOGY
Payer: MEDICAID

## 2020-07-16 VITALS
HEART RATE: 88 BPM | SYSTOLIC BLOOD PRESSURE: 100 MMHG | TEMPERATURE: 99 F | DIASTOLIC BLOOD PRESSURE: 60 MMHG | OXYGEN SATURATION: 100 % | RESPIRATION RATE: 16 BRPM

## 2020-07-16 DIAGNOSIS — O47.9 UTERINE CONTRACTIONS DURING PREGNANCY: Primary | ICD-10-CM

## 2020-07-16 DIAGNOSIS — O30.043 DICHORIONIC DIAMNIOTIC TWIN PREGNANCY IN THIRD TRIMESTER: ICD-10-CM

## 2020-07-16 DIAGNOSIS — Z3A.30 30 WEEKS GESTATION OF PREGNANCY: ICD-10-CM

## 2020-07-16 DIAGNOSIS — Z03.71 SUSPECTED RUPTURE OF MEMBRANES NOT FOUND FOR NORMAL FIRST PREGNANCY: ICD-10-CM

## 2020-07-16 LAB
ABDOMINAL CIRCUMFERENCE: NORMAL
ABO + RH BLD: NORMAL
ANISOCYTOSIS BLD QL SMEAR: SLIGHT
BASOPHILS # BLD AUTO: ABNORMAL K/UL (ref 0–0.2)
BASOPHILS NFR BLD: 1 % (ref 0–1.9)
BIPARIETAL DIAMETER: NORMAL
BLD GP AB SCN CELLS X3 SERPL QL: NORMAL
DIFFERENTIAL METHOD: ABNORMAL
EOSINOPHIL # BLD AUTO: ABNORMAL K/UL (ref 0–0.5)
EOSINOPHIL NFR BLD: 2 % (ref 0–8)
ERYTHROCYTE [DISTWIDTH] IN BLOOD BY AUTOMATED COUNT: 15.6 % (ref 11.5–14.5)
ESTIMATED FETAL WEIGHT: NORMAL
FEMUR LENGTH: NORMAL
FIBRONECTIN FETAL SPEC QL: NEGATIVE
HC/AC: NORMAL
HCT VFR BLD AUTO: 27.1 % (ref 37–48.5)
HEAD CIRCUMFERENCE: NORMAL
HGB BLD-MCNC: 8.4 G/DL (ref 12–16)
HYPOCHROMIA BLD QL SMEAR: ABNORMAL
IMM GRANULOCYTES # BLD AUTO: ABNORMAL K/UL (ref 0–0.04)
IMM GRANULOCYTES NFR BLD AUTO: ABNORMAL % (ref 0–0.5)
LYMPHOCYTES # BLD AUTO: ABNORMAL K/UL (ref 1–4.8)
LYMPHOCYTES NFR BLD: 16 % (ref 18–48)
MCH RBC QN AUTO: 22 PG (ref 27–31)
MCHC RBC AUTO-ENTMCNC: 31 G/DL (ref 32–36)
MCV RBC AUTO: 71 FL (ref 82–98)
MONOCYTES # BLD AUTO: ABNORMAL K/UL (ref 0.3–1)
MONOCYTES NFR BLD: 11 % (ref 4–15)
NEUTROPHILS NFR BLD: 69 % (ref 38–73)
NEUTS BAND NFR BLD MANUAL: 1 %
NRBC BLD-RTO: 0 /100 WBC
PLATELET # BLD AUTO: 221 K/UL (ref 150–350)
PLATELET BLD QL SMEAR: ABNORMAL
PMV BLD AUTO: 11.1 FL (ref 9.2–12.9)
RBC # BLD AUTO: 3.82 M/UL (ref 4–5.4)
WBC # BLD AUTO: 12.43 K/UL (ref 3.9–12.7)

## 2020-07-16 PROCEDURE — 76805 PR US, OB 14+WKS, TRANSABD, SINGLE GESTATION: ICD-10-PCS | Mod: 26,,, | Performed by: OBSTETRICS & GYNECOLOGY

## 2020-07-16 PROCEDURE — 87081 CULTURE SCREEN ONLY: CPT

## 2020-07-16 PROCEDURE — 85007 BL SMEAR W/DIFF WBC COUNT: CPT

## 2020-07-16 PROCEDURE — 76805 OB US >/= 14 WKS SNGL FETUS: CPT

## 2020-07-16 PROCEDURE — 86592 SYPHILIS TEST NON-TREP QUAL: CPT

## 2020-07-16 PROCEDURE — 59025 FETAL NON-STRESS TEST: CPT | Mod: 26,59,, | Performed by: OBSTETRICS & GYNECOLOGY

## 2020-07-16 PROCEDURE — 99284 PR EMERGENCY DEPT VISIT,LEVEL IV: ICD-10-PCS | Mod: 25,,, | Performed by: OBSTETRICS & GYNECOLOGY

## 2020-07-16 PROCEDURE — 76805 OB US >/= 14 WKS SNGL FETUS: CPT | Mod: 26,,, | Performed by: OBSTETRICS & GYNECOLOGY

## 2020-07-16 PROCEDURE — 63600175 PHARM REV CODE 636 W HCPCS: Performed by: STUDENT IN AN ORGANIZED HEALTH CARE EDUCATION/TRAINING PROGRAM

## 2020-07-16 PROCEDURE — 76815 OB US LIMITED FETUS(S): CPT | Performed by: OBSTETRICS & GYNECOLOGY

## 2020-07-16 PROCEDURE — 82731 ASSAY OF FETAL FIBRONECTIN: CPT

## 2020-07-16 PROCEDURE — 59025 PR FETAL 2N-STRESS TEST: ICD-10-PCS | Mod: 26,,, | Performed by: OBSTETRICS & GYNECOLOGY

## 2020-07-16 PROCEDURE — 99284 EMERGENCY DEPT VISIT MOD MDM: CPT | Mod: 25,,, | Performed by: OBSTETRICS & GYNECOLOGY

## 2020-07-16 PROCEDURE — 86901 BLOOD TYPING SEROLOGIC RH(D): CPT

## 2020-07-16 PROCEDURE — 86703 HIV-1/HIV-2 1 RESULT ANTBDY: CPT

## 2020-07-16 PROCEDURE — 85027 COMPLETE CBC AUTOMATED: CPT

## 2020-07-16 PROCEDURE — 99284 EMERGENCY DEPT VISIT MOD MDM: CPT | Mod: 25

## 2020-07-16 PROCEDURE — 59025 FETAL NON-STRESS TEST: CPT | Mod: 59

## 2020-07-16 RX ADMIN — SODIUM CHLORIDE, SODIUM LACTATE, POTASSIUM CHLORIDE, AND CALCIUM CHLORIDE 1000 ML: .6; .31; .03; .02 INJECTION, SOLUTION INTRAVENOUS at 04:07

## 2020-07-16 NOTE — TELEPHONE ENCOUNTER
"Phone call to patient in response to her portal message about cramping and vaginal pressure. Patient is 30w4d today with Di-Di pregnancy and when prompted is "not sure that both babies are moving." Patient reports increased vaginal pressure and is unable to say if she is feeling 4 or more "cramps or contractions" in an hour. Patient was instructed to come in to [facility] for evaluation.     Pt verbalized understanding of information.    "

## 2020-07-16 NOTE — DISCHARGE INSTRUCTIONS
If you have any questions or concerns please call Labor and delivery at [phone]    Please return if you note any of the following:  Decreased fetal movement  Vaginal bleeding  Leakage of fluid from Vagina  Contractions that are 3-5 minutes apart, lasting 1 hour or more

## 2020-07-16 NOTE — ED PROVIDER NOTES
Encounter Date: 2020  Fany May is a 25 y.o. [id number] at 30w4d in di/di twin pregnancy presents complaining of leakage of fluid and abdominal pain. This IUP is uncomplicated. Patient complains of gush of fluid 2 days ago, followed by steady leakage of clear liquid from vagina. Patient also notes painful upper abdominal cramping and pelvic pressure which onset concurrently with LOF 2 days ago, occurs intermittently, patient is unsure of duration or frequency. She denies vaginal bleeding. Positive fetal movement.       History     Chief Complaint   Patient presents with    Contractions     HPI  Review of patient's allergies indicates:  No Known Allergies  Past Medical History:   Diagnosis Date    Sickle cell trait      No past surgical history on file.  No family history on file.  Social History     Tobacco Use    Smoking status: Never Smoker    Smokeless tobacco: Never Used   Substance Use Topics    Alcohol use: Not Currently    Drug use: Never     Review of Systems   Constitutional: Negative for fever.   HENT: Negative for sore throat.    Respiratory: Negative for cough and shortness of breath.    Cardiovascular: Negative for chest pain.   Gastrointestinal: Positive for abdominal pain (intermittent upper abdominal pain). Negative for nausea.   Genitourinary: Positive for pelvic pain (pelvic pressure) and vaginal discharge (thin, clear vaginal discharge). Negative for dysuria, flank pain, hematuria and vaginal bleeding.   Musculoskeletal: Negative for back pain and myalgias.   Skin: Negative for rash.   Neurological: Negative for weakness and headaches.   Hematological: Does not bruise/bleed easily.       Physical Exam     Initial Vitals   BP Pulse Resp Temp SpO2   20 1114 20 1114 20 1114 20 1118 20 1114   (!) 106/56 90 16 99.1 °F (37.3 °C) 98 %      MAP       --                Physical Exam    Vitals reviewed.  Constitutional: She appears well-developed and  well-nourished. She is not diaphoretic. No distress.   HENT:   Head: Normocephalic and atraumatic.   Eyes: EOM are normal.   Neck: Normal range of motion.   Cardiovascular: Normal rate and intact distal pulses.   Pulmonary/Chest: No respiratory distress.   Abdominal: There is abdominal tenderness (TTP at uterine fundus during contractions).   Gravid abdomen, appropriate size for gestational age   Genitourinary:    Vaginal discharge (minimal clear vaginal discharge) present.     Musculoskeletal: Normal range of motion. No tenderness or edema.   Neurological: She is alert and oriented to person, place, and time.   Skin: Skin is warm and dry.   Psychiatric: She has a normal mood and affect. Her behavior is normal.     OB LABOR EXAM:     Membranes ruptured: No.   Method: Sterile vaginal exam per MD.   Vaginal Bleeding: none present.     Dilatation: 1.   Station: -3.   Effacement: 50%.     Amniotic Fluid Amount: none noted.   Comments: Repeat check 1 hour later was unchanged 1.5/50/-3       ED Course   Obtain Fetal nonstress test (NST)    Date/Time: 7/16/2020 12:31 PM  Performed by: Annemarie Tam MD  Authorized by: Annemarie Tam MD     Nonstress Test:     Variability: A: baseline 140, moderate variability, + accels, no decels; B: baseline 140, moderate variability, + accels, - decels.    Contractions:  Regular    Contraction Frequency:  Q2-4min  Biophysical Profile:     Nonstress Test Interpretation: reactive      Overall Impression:  Reassuring  Post-procedure:     Patient tolerance:  Patient tolerated the procedure well with no immediate complications     A: baseline 140, moderate variability, + accels, no decels; B: baseline 140, moderate variability, + accels, - decels      Labs Reviewed   CBC W/ AUTO DIFFERENTIAL - Abnormal; Notable for the following components:       Result Value    RBC 3.82 (*)     Hemoglobin 8.4 (*)     Hematocrit 27.1 (*)     Mean Corpuscular Volume 71 (*)     Mean Corpuscular Hemoglobin  22.0 (*)     Mean Corpuscular Hemoglobin Conc 31.0 (*)     RDW 15.6 (*)     Lymph% 16.0 (*)     All other components within normal limits   STREP B SCREEN, VAGINAL / RECTAL   FETAL FIBRONECTIN   HIV 1 / 2 ANTIBODY   RPR   TYPE & SCREEN          Imaging Results    None          Medical Decision Making:   ED Management:  - VSS  - Rule out rupture negative x3: no ferning, neg nitrazine, neg pooling  - MVP for Twin A and B wnl- no oligohydramnios  - NST reactive and reassuring with ctx occurring on toco q2-3 min   - Cervical exam 1/50/-3 with recheck 1 hour later the same at 1/50/-3  - FFN negative  - PO hydration given and patient denied tylenol for painful contractions  -Patient continues to have contractions every 2-3 minutes, however no cervical change upon 3 rechecks. Patient continues to be 1/50/-3  -Discussed with MFM, as patient is not dilating, and not PTL, steroids are not indicated.   -Pt to have close follow up with MFM and with her OBGYN.    Other:   I have discussed this case with another health care provider.       <> Summary of the Discussion: Dr. Carter, Dr. Aponte (M resident), Dr. Seaman              Attending Attestation:   Physician Attestation Statement for Resident:  As the supervising MD   Physician Attestation Statement: I have personally seen and examined this patient.   I agree with the above history. -:   As the supervising MD I agree with the above PE.    As the supervising MD I agree with the above treatment, course, plan, and disposition.   -: NST A  I independently reviewed the fetal non-stress test with the following interpretation:  140 BPM baseline  Variability: moderate  Accelerations: present  Decelerations: absent  Contractions: Q 2-4 min  Category 1    Clinical Interpretation:reactive  NST B  I independently reviewed the fetal non-stress test with the following interpretation:  140 BPM baseline  Variability: moderate  Accelerations: present  Decelerations: absent  Contractions: Q  2-4 min  Category 1    Clinical Interpretation:reactive    Bedside US: vertex/breech with adequate fluid x2, anterior/fundal placenta    Patient evaluated and found to be stable, agree with resident's assessment of di/di twins with  contractions but without cervical change over several hours and plan to defer  steroids (per MFM) and discharge to home with precautions re s/s  labor.  I was personally present during the critical portions of the procedure(s) performed by the resident and was immediately available in the ED to provide services and assistance as needed during the entire procedure.  I have reviewed and agree with the residents interpretation of the following: lab data.  I have reviewed the following: old records at this facility.                                  Clinical Impression:       ICD-10-CM ICD-9-CM   1. Uterine contractions during pregnancy  O62.2 661.20   2. Suspected rupture of membranes not found for normal first pregnancy  Z03.71 V89.01   3. 30 weeks gestation of pregnancy  Z3A.30 V22.2   4. Dichorionic diamniotic twin pregnancy in third trimester  O30.043 651.03     V91.03         Disposition:   Disposition: Discharged  Condition: Stable     ED Disposition Condition    Discharge Stable        ED Prescriptions     None        Follow-up Information    None                     Annemarie Tam MD PGY-1  Obstetrics and Gynecology                 Annemarie Tam MD  Resident  [phone]       Lis Carter MD  [phone]

## 2020-07-17 LAB
HIV 1+2 AB+HIV1 P24 AG SERPL QL IA: NEGATIVE
RPR SER QL: NORMAL

## 2020-07-20 LAB — BACTERIA SPEC AEROBE CULT: NORMAL

## 2020-07-21 ENCOUNTER — ROUTINE PRENATAL (OUTPATIENT)
Dept: OBSTETRICS AND GYNECOLOGY | Facility: CLINIC | Age: 25
End: 2020-07-21
Payer: MEDICAID

## 2020-07-21 VITALS
WEIGHT: 112.19 LBS | BODY MASS INDEX: 21.92 KG/M2 | SYSTOLIC BLOOD PRESSURE: 110 MMHG | DIASTOLIC BLOOD PRESSURE: 70 MMHG

## 2020-07-21 DIAGNOSIS — O30.043 DICHORIONIC DIAMNIOTIC TWIN PREGNANCY IN THIRD TRIMESTER: Primary | ICD-10-CM

## 2020-07-21 DIAGNOSIS — D50.9 IRON DEFICIENCY ANEMIA, UNSPECIFIED IRON DEFICIENCY ANEMIA TYPE: ICD-10-CM

## 2020-07-21 PROCEDURE — 99999 PR PBB SHADOW E&M-EST. PATIENT-LVL II: CPT | Mod: PBBFAC,,, | Performed by: ADVANCED PRACTICE MIDWIFE

## 2020-07-21 PROCEDURE — 99999 PR PBB SHADOW E&M-EST. PATIENT-LVL II: ICD-10-PCS | Mod: PBBFAC,,, | Performed by: ADVANCED PRACTICE MIDWIFE

## 2020-07-21 PROCEDURE — 99213 OFFICE O/P EST LOW 20 MIN: CPT | Mod: TH,S$PBB,, | Performed by: ADVANCED PRACTICE MIDWIFE

## 2020-07-21 PROCEDURE — 99212 OFFICE O/P EST SF 10 MIN: CPT | Mod: PBBFAC,TH,PO | Performed by: ADVANCED PRACTICE MIDWIFE

## 2020-07-21 PROCEDURE — 99213 PR OFFICE/OUTPT VISIT, EST, LEVL III, 20-29 MIN: ICD-10-PCS | Mod: TH,S$PBB,, | Performed by: ADVANCED PRACTICE MIDWIFE

## 2020-07-21 NOTE — PROGRESS NOTES
Reason for visit: Routine Prenatal Visit    HPI:   25 y.o., at 31w2d by Estimated Date of Delivery: 20    In with no c/o- just discomforts of pregnancy    ROS:  OBSTETRICS  - Contractions: denies  - Bleeding: denies  - Loss of fluid: denies  - Fetal movement: reports good FM  GASTRO  - Nausea: denies  - Vomiting: denies  NEURO  - Headache: denies      Past medical, surgical, social, and family, and history: Reviewed and updated in EMR.  Medications: Reviewed and updated in EMR.  Allergies: Patient has no known allergies.     OB History    Para Term  AB Living   2 1 1 0 0 1   SAB TAB Ectopic Multiple Live Births   0 0 0   1      # Outcome Date GA Lbr Vishal/2nd Weight Sex Delivery Anes PTL Lv   2 Current            1 Term 18 39w6d 04:57 / 02:06 3.555 kg (7 lb 13.4 oz) F Vag-Spont EPI N SHEREE      Complications: Cervical laceration       Pregnancy dating, labs, ultrasound reports, prenatal testing, and problem list: Reviewed and updated in EMR.    Pregnancy 2 Problems (from 20 to present)     Problem Noted Resolved    Desires immediate post-placental Mirena 2020 by JANIE Castillo MD No    Sickle cell trait 3/3/2020 by JNAIE Castillo MD No    Dichorionic diamniotic twin pregnancy, antepartum 3/3/2020 by JANIE Castillo MD No    Overview Signed 2020 10:43 AM by JANIE Castillo MD     - 2020: v/v, 24%ile/24%ile, discordance=0.9%  - 2020: repeat u/s scheduled.         Pregnancy with poor reproductive history, antepartum 3/3/2020 by JANIE Castillo MD No    Overview Addendum 2020 11:23 AM by JANIE Castillo MD     G1 -  with cervical laceration resulting in PPH requiring transfusion of 4 units pRBC.  Admitted to ICU on PPD#0 for hypoxia and possible pneumonia.    Labs - Glucose screen normal per patient.  Dating - By LMP consistent with MFM u/s at 11 weeks.  U/S - Normal anatomy.  Aneuploidy screening - Quad=(-).  Vaccines - 2020:  Tdap.  Contraception - Desires immediate post-placental Mirena.  Pap - 2020: Normal per prenatal record.                 Vitals: /70   Wt 50.9 kg (112 lb 3.4 oz)   LMP 12/15/2019   BMI 21.92 kg/m²     Physical exam:  GENERAL: No acute distress, normal appearance  NECK: Supple, normal ROM  SKIN: No rashes  NEURO: Alert and oriented x3  PSYCH: Normal mood and affect  CARDIO: Trace bilateral LE edema  PULMONARY: Normal respiratory effort; no respiratory distress  ABD: Soft, gravid, nontender; no hernia or hepatosplenomegaly    Assessment and Plan:    Dichorionic diamniotic twin pregnancy in third trimester        Discussed abnormal DM screen and need for 3hr GTT- appt scheduled for 20 and instructions given for test.     labor precautions given  Follow-up:  weeks

## 2020-07-23 ENCOUNTER — OFFICE VISIT (OUTPATIENT)
Dept: MATERNAL FETAL MEDICINE | Facility: CLINIC | Age: 25
End: 2020-07-23
Payer: MEDICAID

## 2020-07-23 ENCOUNTER — HOSPITAL ENCOUNTER (INPATIENT)
Facility: OTHER | Age: 25
LOS: 20 days | Discharge: HOME | End: 2020-08-12
Attending: OBSTETRICS & GYNECOLOGY | Admitting: OBSTETRICS & GYNECOLOGY
Payer: MEDICAID

## 2020-07-23 ENCOUNTER — HOSPITAL ENCOUNTER (OUTPATIENT)
Dept: PERINATAL CARE | Facility: OTHER | Age: 25
Discharge: HOME | End: 2020-07-23
Attending: PEDIATRICS
Payer: MEDICAID

## 2020-07-23 ENCOUNTER — PROCEDURE VISIT (OUTPATIENT)
Dept: MATERNAL FETAL MEDICINE | Facility: CLINIC | Age: 25
End: 2020-07-23
Payer: MEDICAID

## 2020-07-23 DIAGNOSIS — O42.919 PRETERM PREMATURE RUPTURE OF MEMBRANES (PPROM) WITH UNKNOWN ONSET OF LABOR: ICD-10-CM

## 2020-07-23 DIAGNOSIS — O36.5930 INTRAUTERINE GROWTH RESTRICTION (IUGR) AFFECTING CARE OF MOTHER, THIRD TRIMESTER, SINGLE OR UNSPECIFIED FETUS: ICD-10-CM

## 2020-07-23 DIAGNOSIS — O36.5910: Primary | ICD-10-CM

## 2020-07-23 DIAGNOSIS — Z36.89 ENCOUNTER FOR ULTRASOUND TO ASSESS FETAL GROWTH: ICD-10-CM

## 2020-07-23 DIAGNOSIS — Z98.891 STATUS POST PRIMARY LOW TRANSVERSE CESAREAN SECTION: Primary | ICD-10-CM

## 2020-07-23 DIAGNOSIS — O42.90 PREMATURE RUPTURE OF MEMBRANES, UNSPECIFIED DURATION TO ONSET OF LABOR, UNSPECIFIED GESTATIONAL AGE: ICD-10-CM

## 2020-07-23 DIAGNOSIS — Z3A.31 31 WEEKS GESTATION OF PREGNANCY: ICD-10-CM

## 2020-07-23 LAB
ABO + RH BLD: NORMAL
ALBUMIN SERPL BCP-MCNC: 2.6 G/DL (ref 3.5–5.2)
ALP SERPL-CCNC: 139 U/L (ref 55–135)
ALT SERPL W/O P-5'-P-CCNC: 5 U/L (ref 10–44)
ANION GAP SERPL CALC-SCNC: 10 MMOL/L (ref 8–16)
ANISOCYTOSIS BLD QL SMEAR: SLIGHT
AST SERPL-CCNC: 12 U/L (ref 10–40)
BASOPHILS NFR BLD: 0 % (ref 0–1.9)
BILIRUB SERPL-MCNC: 0.4 MG/DL (ref 0.1–1)
BLD GP AB SCN CELLS X3 SERPL QL: NORMAL
BUN SERPL-MCNC: 4 MG/DL (ref 6–20)
CALCIUM SERPL-MCNC: 8.4 MG/DL (ref 8.7–10.5)
CHLORIDE SERPL-SCNC: 107 MMOL/L (ref 95–110)
CO2 SERPL-SCNC: 21 MMOL/L (ref 23–29)
CREAT SERPL-MCNC: 0.5 MG/DL (ref 0.5–1.4)
DIFFERENTIAL METHOD: ABNORMAL
EOSINOPHIL NFR BLD: 0 % (ref 0–8)
ERYTHROCYTE [DISTWIDTH] IN BLOOD BY AUTOMATED COUNT: 16.1 % (ref 11.5–14.5)
EST. GFR  (AFRICAN AMERICAN): >60 ML/MIN/1.73 M^2
EST. GFR  (NON AFRICAN AMERICAN): >60 ML/MIN/1.73 M^2
FIBRONECTIN FETAL SPEC QL: POSITIVE
GLUCOSE SERPL-MCNC: 95 MG/DL (ref 70–110)
HCT VFR BLD AUTO: 26.7 % (ref 37–48.5)
HGB BLD-MCNC: 8.3 G/DL (ref 12–16)
HYPOCHROMIA BLD QL SMEAR: ABNORMAL
IMM GRANULOCYTES # BLD AUTO: ABNORMAL K/UL (ref 0–0.04)
IMM GRANULOCYTES NFR BLD AUTO: ABNORMAL % (ref 0–0.5)
LYMPHOCYTES NFR BLD: 9 % (ref 18–48)
MCH RBC QN AUTO: 21.7 PG (ref 27–31)
MCHC RBC AUTO-ENTMCNC: 31.1 G/DL (ref 32–36)
MCV RBC AUTO: 70 FL (ref 82–98)
METAMYELOCYTES NFR BLD MANUAL: 2 %
MONOCYTES NFR BLD: 4 % (ref 4–15)
NEUTROPHILS NFR BLD: 85 % (ref 38–73)
NRBC BLD-RTO: 0 /100 WBC
PLATELET # BLD AUTO: 212 K/UL (ref 150–350)
PLATELET BLD QL SMEAR: ABNORMAL
PMV BLD AUTO: 10.4 FL (ref 9.2–12.9)
POTASSIUM SERPL-SCNC: 3.8 MMOL/L (ref 3.5–5.1)
PROT SERPL-MCNC: 6.8 G/DL (ref 6–8.4)
RBC # BLD AUTO: 3.82 M/UL (ref 4–5.4)
SARS-COV-2 RDRP RESP QL NAA+PROBE: NEGATIVE
SODIUM SERPL-SCNC: 138 MMOL/L (ref 136–145)
WBC # BLD AUTO: 10.25 K/UL (ref 3.9–12.7)

## 2020-07-23 PROCEDURE — 76819 FETAL BIOPHYS PROFIL W/O NST: CPT | Mod: 59,PBBFAC | Performed by: PEDIATRICS

## 2020-07-23 PROCEDURE — 76820 UMBILICAL ARTERY ECHO: CPT | Mod: 59,PBBFAC | Performed by: PEDIATRICS

## 2020-07-23 PROCEDURE — 63600175 PHARM REV CODE 636 W HCPCS: Performed by: OBSTETRICS & GYNECOLOGY

## 2020-07-23 PROCEDURE — 76820 PR US, OB DOPPLER, FETAL UMBILICAL ARTERY ECHO: ICD-10-PCS | Mod: 26,59,S$PBB, | Performed by: PEDIATRICS

## 2020-07-23 PROCEDURE — 59025 PR FETAL 2N-STRESS TEST: ICD-10-PCS | Mod: 26,59,, | Performed by: OBSTETRICS & GYNECOLOGY

## 2020-07-23 PROCEDURE — 85027 COMPLETE CBC AUTOMATED: CPT

## 2020-07-23 PROCEDURE — 76820 UMBILICAL ARTERY ECHO: CPT | Mod: 26,S$PBB,, | Performed by: PEDIATRICS

## 2020-07-23 PROCEDURE — 25000003 PHARM REV CODE 250: Performed by: STUDENT IN AN ORGANIZED HEALTH CARE EDUCATION/TRAINING PROGRAM

## 2020-07-23 PROCEDURE — 59025 FETAL NON-STRESS TEST: CPT

## 2020-07-23 PROCEDURE — 11000001 HC ACUTE MED/SURG PRIVATE ROOM

## 2020-07-23 PROCEDURE — 85007 BL SMEAR W/DIFF WBC COUNT: CPT

## 2020-07-23 PROCEDURE — 99499 UNLISTED E&M SERVICE: CPT | Mod: S$PBB,,, | Performed by: PEDIATRICS

## 2020-07-23 PROCEDURE — 86850 RBC ANTIBODY SCREEN: CPT

## 2020-07-23 PROCEDURE — 63600175 PHARM REV CODE 636 W HCPCS: Performed by: STUDENT IN AN ORGANIZED HEALTH CARE EDUCATION/TRAINING PROGRAM

## 2020-07-23 PROCEDURE — U0002 COVID-19 LAB TEST NON-CDC: HCPCS

## 2020-07-23 PROCEDURE — 36415 COLL VENOUS BLD VENIPUNCTURE: CPT

## 2020-07-23 PROCEDURE — 76816 PR  US,PREGNANT UTERUS,F/U,TRANSABD APP: ICD-10-PCS | Mod: 26,S$PBB,, | Performed by: PEDIATRICS

## 2020-07-23 PROCEDURE — 99285 EMERGENCY DEPT VISIT HI MDM: CPT | Mod: 25

## 2020-07-23 PROCEDURE — 99499 NO LOS: ICD-10-PCS | Mod: S$PBB,,, | Performed by: PEDIATRICS

## 2020-07-23 PROCEDURE — 76816 OB US FOLLOW-UP PER FETUS: CPT | Mod: 59,PBBFAC | Performed by: PEDIATRICS

## 2020-07-23 PROCEDURE — 59025 FETAL NON-STRESS TEST: CPT | Mod: 26,59,, | Performed by: OBSTETRICS & GYNECOLOGY

## 2020-07-23 PROCEDURE — 99223 1ST HOSP IP/OBS HIGH 75: CPT | Mod: 25,,, | Performed by: OBSTETRICS & GYNECOLOGY

## 2020-07-23 PROCEDURE — 76819 PR US, OB, FETAL BIOPHYSICAL, W/O NST: ICD-10-PCS | Mod: 26,59,S$PBB, | Performed by: PEDIATRICS

## 2020-07-23 PROCEDURE — 80053 COMPREHEN METABOLIC PANEL: CPT

## 2020-07-23 PROCEDURE — 82731 ASSAY OF FETAL FIBRONECTIN: CPT

## 2020-07-23 PROCEDURE — 76816 OB US FOLLOW-UP PER FETUS: CPT | Mod: 26,S$PBB,, | Performed by: PEDIATRICS

## 2020-07-23 PROCEDURE — 76819 FETAL BIOPHYS PROFIL W/O NST: CPT | Mod: 26,S$PBB,, | Performed by: PEDIATRICS

## 2020-07-23 PROCEDURE — 96372 THER/PROPH/DIAG INJ SC/IM: CPT

## 2020-07-23 PROCEDURE — 99223 PR INITIAL HOSPITAL CARE,LEVL III: ICD-10-PCS | Mod: 25,,, | Performed by: OBSTETRICS & GYNECOLOGY

## 2020-07-23 RX ORDER — AMOXICILLIN 250 MG
1 CAPSULE ORAL NIGHTLY PRN
Status: DISCONTINUED | OUTPATIENT
Start: 2020-07-23 | End: 2020-08-10

## 2020-07-23 RX ORDER — AZITHROMYCIN 200 MG/5ML
1000 POWDER, FOR SUSPENSION ORAL DAILY
Status: DISCONTINUED | OUTPATIENT
Start: 2020-07-27 | End: 2020-07-23 | Stop reason: DRUGHIGH

## 2020-07-23 RX ORDER — AZITHROMYCIN 200 MG/5ML
1000 POWDER, FOR SUSPENSION ORAL ONCE
Status: COMPLETED | OUTPATIENT
Start: 2020-07-27 | End: 2020-07-27

## 2020-07-23 RX ORDER — BETAMETHASONE SODIUM PHOSPHATE AND BETAMETHASONE ACETATE 3; 3 MG/ML; MG/ML
12 INJECTION, SUSPENSION INTRA-ARTICULAR; INTRALESIONAL; INTRAMUSCULAR; SOFT TISSUE ONCE
Status: COMPLETED | OUTPATIENT
Start: 2020-07-23 | End: 2020-07-23

## 2020-07-23 RX ORDER — ACETAMINOPHEN 650 MG/20.3ML
650 LIQUID ORAL ONCE
Status: COMPLETED | OUTPATIENT
Start: 2020-07-23 | End: 2020-07-23

## 2020-07-23 RX ORDER — DIPHENHYDRAMINE HCL 25 MG
25 CAPSULE ORAL EVERY 4 HOURS PRN
Status: DISCONTINUED | OUTPATIENT
Start: 2020-07-23 | End: 2020-08-10

## 2020-07-23 RX ORDER — BETAMETHASONE SODIUM PHOSPHATE AND BETAMETHASONE ACETATE 3; 3 MG/ML; MG/ML
12 INJECTION, SUSPENSION INTRA-ARTICULAR; INTRALESIONAL; INTRAMUSCULAR; SOFT TISSUE ONCE
Status: COMPLETED | OUTPATIENT
Start: 2020-07-24 | End: 2020-07-24

## 2020-07-23 RX ORDER — BETAMETHASONE SODIUM PHOSPHATE AND BETAMETHASONE ACETATE 3; 3 MG/ML; MG/ML
12 INJECTION, SUSPENSION INTRA-ARTICULAR; INTRALESIONAL; INTRAMUSCULAR; SOFT TISSUE
Status: DISCONTINUED | OUTPATIENT
Start: 2020-07-23 | End: 2020-08-12 | Stop reason: HOSPADM

## 2020-07-23 RX ORDER — ACETAMINOPHEN 325 MG/1
650 TABLET ORAL EVERY 6 HOURS PRN
Status: DISCONTINUED | OUTPATIENT
Start: 2020-07-23 | End: 2020-08-10

## 2020-07-23 RX ORDER — SODIUM CHLORIDE, SODIUM LACTATE, POTASSIUM CHLORIDE, CALCIUM CHLORIDE 600; 310; 30; 20 MG/100ML; MG/100ML; MG/100ML; MG/100ML
INJECTION, SOLUTION INTRAVENOUS CONTINUOUS
Status: DISCONTINUED | OUTPATIENT
Start: 2020-07-23 | End: 2020-07-23

## 2020-07-23 RX ORDER — CALCIUM GLUCONATE 98 MG/ML
1 INJECTION, SOLUTION INTRAVENOUS
Status: DISCONTINUED | OUTPATIENT
Start: 2020-07-23 | End: 2020-08-10

## 2020-07-23 RX ORDER — SODIUM CHLORIDE, SODIUM LACTATE, POTASSIUM CHLORIDE, CALCIUM CHLORIDE 600; 310; 30; 20 MG/100ML; MG/100ML; MG/100ML; MG/100ML
INJECTION, SOLUTION INTRAVENOUS CONTINUOUS
Status: DISCONTINUED | OUTPATIENT
Start: 2020-07-23 | End: 2020-07-24

## 2020-07-23 RX ORDER — SODIUM CHLORIDE 9 MG/ML
INJECTION, SOLUTION INTRAVENOUS CONTINUOUS
Status: DISCONTINUED | OUTPATIENT
Start: 2020-07-23 | End: 2020-07-23

## 2020-07-23 RX ORDER — SODIUM CHLORIDE 0.9 % (FLUSH) 0.9 %
10 SYRINGE (ML) INJECTION
Status: DISCONTINUED | OUTPATIENT
Start: 2020-07-23 | End: 2020-08-10

## 2020-07-23 RX ORDER — MAGNESIUM SULFATE HEPTAHYDRATE 40 MG/ML
6 INJECTION, SOLUTION INTRAVENOUS ONCE
Status: COMPLETED | OUTPATIENT
Start: 2020-07-23 | End: 2020-07-23

## 2020-07-23 RX ORDER — AZITHROMYCIN 200 MG/5ML
1000 POWDER, FOR SUSPENSION ORAL DAILY
Status: DISCONTINUED | OUTPATIENT
Start: 2020-07-23 | End: 2020-07-23 | Stop reason: DRUGHIGH

## 2020-07-23 RX ORDER — ONDANSETRON 8 MG/1
8 TABLET, ORALLY DISINTEGRATING ORAL EVERY 8 HOURS PRN
Status: DISCONTINUED | OUTPATIENT
Start: 2020-07-23 | End: 2020-08-10

## 2020-07-23 RX ORDER — SIMETHICONE 80 MG
1 TABLET,CHEWABLE ORAL EVERY 6 HOURS PRN
Status: DISCONTINUED | OUTPATIENT
Start: 2020-07-23 | End: 2020-07-24 | Stop reason: SDUPTHER

## 2020-07-23 RX ORDER — MAGNESIUM SULFATE HEPTAHYDRATE 40 MG/ML
2 INJECTION, SOLUTION INTRAVENOUS CONTINUOUS
Status: DISCONTINUED | OUTPATIENT
Start: 2020-07-23 | End: 2020-07-24

## 2020-07-23 RX ORDER — AZITHROMYCIN 200 MG/5ML
1000 POWDER, FOR SUSPENSION ORAL ONCE
Status: COMPLETED | OUTPATIENT
Start: 2020-07-23 | End: 2020-07-23

## 2020-07-23 RX ORDER — PRENATAL WITH FERROUS FUM AND FOLIC ACID 3080; 920; 120; 400; 22; 1.84; 3; 20; 10; 1; 12; 200; 27; 25; 2 [IU]/1; [IU]/1; MG/1; [IU]/1; MG/1; MG/1; MG/1; MG/1; MG/1; MG/1; UG/1; MG/1; MG/1; MG/1; MG/1
1 TABLET ORAL DAILY
Status: DISCONTINUED | OUTPATIENT
Start: 2020-07-23 | End: 2020-08-10

## 2020-07-23 RX ORDER — AMOXICILLIN 400 MG/5ML
500 POWDER, FOR SUSPENSION ORAL EVERY 8 HOURS
Status: DISCONTINUED | OUTPATIENT
Start: 2020-07-25 | End: 2020-08-02

## 2020-07-23 RX ORDER — AMOXICILLIN 400 MG/5ML
500 POWDER, FOR SUSPENSION ORAL EVERY 8 HOURS
Status: DISCONTINUED | OUTPATIENT
Start: 2020-07-25 | End: 2020-07-23

## 2020-07-23 RX ORDER — DIPHENHYDRAMINE HYDROCHLORIDE 50 MG/ML
25 INJECTION INTRAMUSCULAR; INTRAVENOUS EVERY 4 HOURS PRN
Status: DISCONTINUED | OUTPATIENT
Start: 2020-07-23 | End: 2020-08-10

## 2020-07-23 RX ADMIN — MAGNESIUM SULFATE IN WATER 2 G/HR: 40 INJECTION, SOLUTION INTRAVENOUS at 06:07

## 2020-07-23 RX ADMIN — ACETAMINOPHEN 650 MG: 160 SOLUTION ORAL at 06:07

## 2020-07-23 RX ADMIN — BETAMETHASONE SODIUM PHOSPHATE AND BETAMETHASONE ACETATE 12 MG: 3; 3 INJECTION, SUSPENSION INTRA-ARTICULAR; INTRALESIONAL; INTRAMUSCULAR at 12:07

## 2020-07-23 RX ADMIN — AZITHROMYCIN 1000 MG: 200 POWDER, FOR SUSPENSION ORAL at 02:07

## 2020-07-23 RX ADMIN — AMPICILLIN SODIUM 2 G: 2 INJECTION, POWDER, FOR SOLUTION INTRAMUSCULAR; INTRAVENOUS at 08:07

## 2020-07-23 RX ADMIN — SODIUM CHLORIDE, SODIUM LACTATE, POTASSIUM CHLORIDE, AND CALCIUM CHLORIDE: .6; .31; .03; .02 INJECTION, SOLUTION INTRAVENOUS at 06:07

## 2020-07-23 RX ADMIN — SODIUM CHLORIDE, SODIUM LACTATE, POTASSIUM CHLORIDE, AND CALCIUM CHLORIDE: .6; .31; .03; .02 INJECTION, SOLUTION INTRAVENOUS at 02:07

## 2020-07-23 RX ADMIN — AMPICILLIN SODIUM 2 G: 2 INJECTION, POWDER, FOR SOLUTION INTRAMUSCULAR; INTRAVENOUS at 02:07

## 2020-07-23 RX ADMIN — MAGNESIUM SULFATE HEPTAHYDRATE 6 G: 40 INJECTION, SOLUTION INTRAVENOUS at 05:07

## 2020-07-23 RX ADMIN — SODIUM CHLORIDE, SODIUM LACTATE, POTASSIUM CHLORIDE, AND CALCIUM CHLORIDE 1000 ML: .6; .31; .03; .02 INJECTION, SOLUTION INTRAVENOUS at 12:07

## 2020-07-23 NOTE — ASSESSMENT & PLAN NOTE
- Consents for Delivery and Blood signed  - Administer course of BMZ for fetal lung maturity. Dose 1   - Will collect GBS culture   - Monitor fetal status closely - Fetal position confirmed as cephalic/breech.   - NST on admit is reactive and reassuring  -[place]: contractions q3-10m  -Last US on  (today); EFW was A: 1340 (8th percent), B 1427 g (11%).   -As EFW < 1500, patient would be for

## 2020-07-23 NOTE — ED PROVIDER NOTES
Encounter Date: 2020      Fany May is a 25 y.o. [id number] at 31w4d with di-di twin pregnancy who presents from [facility] clinic for concern for rupture of membranes. Patient endorses loss of fluid since  as well as contractions that have increased in frequency and intensity. BPP of 8/8 in [facility] clinic today. Multiple sites of sac separation between twin A and twin B were found on [facility] ultrasound concerning for chorioamniotic membrane separation and PPROM.     Denies vaginal bleeding,   Fetal Movement: normal.     History     Chief Complaint   Patient presents with    Contractions    Rupture of Membranes     HPI  Review of patient's allergies indicates:  No Known Allergies  Past Medical History:   Diagnosis Date    Sickle cell trait      No past surgical history on file.  No family history on file.  Social History     Tobacco Use    Smoking status: Never Smoker    Smokeless tobacco: Never Used   Substance Use Topics    Alcohol use: Not Currently    Drug use: Never     Review of Systems   Constitutional: Negative for fever.   HENT: Negative for sore throat.    Eyes: Negative for visual disturbance.   Respiratory: Negative for shortness of breath.    Cardiovascular: Negative for chest pain.   Gastrointestinal: Negative for nausea.   Genitourinary: Negative for dysuria.   Musculoskeletal: Negative for back pain.   Neurological: Negative for headaches.   Hematological: Does not bruise/bleed easily.   Psychiatric/Behavioral: Negative for confusion.   + clear/white vaginal discharge for 6 days  + contraction pain  + can not swallow pills/tablets    Physical Exam     Initial Vitals   BP Pulse Resp Temp SpO2   20 1220 20 1220 20 1220 20 1500 20 1220   (!) 97/59 85 16 98.8 °F (37.1 °C) 100 %      MAP       --                Physical Exam    Constitutional: She appears well-developed and well-nourished.   HENT:   Head: Normocephalic.   Eyes: EOM are normal.   Neck: Normal range of  motion.   Cardiovascular: Normal rate.   Pulmonary/Chest: Breath sounds normal.   Abdominal: Soft. There is no abdominal tenderness.   Genitourinary:    Vagina normal.     Musculoskeletal: No edema.   Neurological: She is alert.   Skin: Skin is warm and dry.     OB LABOR EXAM:       Method: Sterile vaginal exam per MD and Sterile speculum exam per MD.   Vaginal Bleeding: none present.     Dilatation: 2.   Station: -3.   Effacement: 60%.       Comments: - Sterile speculum exam revealed negative pooling with valsalva, negative Nitrazine, negative pooling    - Bedside ultrasound revealed the    fluid pocket concerning for chorionic membrane separation. Twin A was cephalic and Twin B transverse.    ED Course   Procedures  Labs Reviewed   FETAL FIBRONECTIN - Abnormal; Notable for the following components:       Result Value    Fetal Fibronectin Positive (*)     All other components within normal limits   CBC W/ AUTO DIFFERENTIAL - Abnormal; Notable for the following components:    RBC 3.82 (*)     Hemoglobin 8.3 (*)     Hematocrit 26.7 (*)     Mean Corpuscular Volume 70 (*)     Mean Corpuscular Hemoglobin 21.7 (*)     Mean Corpuscular Hemoglobin Conc 31.1 (*)     RDW 16.1 (*)     Gran% 85.0 (*)     Lymph% 9.0 (*)     All other components within normal limits          Imaging Results    None          Medical Decision Making:   ED Management:  1. Twin gestation with PPROM  - VSS  - NST reactive and reassuring  - Negative pooling, nitrazine, and ferning  - Painfully elizabeth and 2/60/-3  - However +FFN and ultrasound in MFM clinic and bedside ultrasound in [facility] concerning chorionic membrane separation and PPROM  - EFW for twin A 1340g (8%) and 1427 g (11%)  - Admit to L&D for observation and PPROM  - Start betamethazone 2g q12 for 4 doses  - Start latency abx ampicillin and azithromycin  - Consents signed  - Twin A cephalic, Twin B transverse                     Annemarie Tam MD PGY-1  Obstetrics and Gynecology              Clinical Impression:       ICD-10-CM ICD-9-CM   1. Premature rupture of membranes, unspecified duration to onset of labor, unspecified gestational age  O42.90 658.10   2. 31 weeks gestation of pregnancy  Z3A.31 V22.2   3. Diamniotic/dichorionic twins  4. Anemia in pregnancy  5. Sickle cell trait          ED Disposition Condition    Send to L&D                           Annemarie Tam MD  Resident  07/23/20 1511       Annemarie Tam MD  Resident  07/23/20 1608    The patient has been seen and examined by me, and I agree with the Resident assessment and plan as above. Care plan reviewed with MFM referring, Dr Delgadillo.

## 2020-07-23 NOTE — SUBJECTIVE & OBJECTIVE
Obstetric HPI:  Patient reports contractions, denies vaginal bleeding, reports LOF.   Fetal Movement: normal.         Objective:     Vital Signs (Most Recent):  Temp: 98.8 °F (37.1 °C) (07/23/20 1500)  Pulse: 106 (07/23/20 1500)  Resp: 14 (07/23/20 1500)  BP: (!) 90/50 (07/23/20 1500)  SpO2: 100 % (07/23/20 1500) Vital Signs (24h Range):  Temp:  [98.8 °F (37.1 °C)] 98.8 °F (37.1 °C)  Pulse:  [] 106  Resp:  [14-16] 14  SpO2:  [100 %] 100 %  BP: ()/(50-59) 90/50     Weight: 50.8 kg (112 lb)  Body mass index is 21.87 kg/m².    FHT:    A: 130 moderate variability, + accels   B: 130 moderate variability, + accels, - decels    (reassuring)  TOCO:  Q 2-10 minutes      Cervical Exam:  Dilation:  2  Effacement:  50%  Station: -3  Presentation: Vertex/Transverse per bedside ultrasound in the [facility] 7/23     Significant Labs:  Recent Lab Results       07/23/20  1406   07/23/20  1250   07/23/20  1239        Aniso   Slight       Basophil%   0.0       Differential Method   Manual       Eosinophil%   0.0       Fetal Fibronectin     Positive     Gran%   85.0       Group & Rh   B POS       Hematocrit   26.7       Hemoglobin   8.3       Hypo   Occasional       Immature Grans (Abs)   CANCELED  Comment:  Mild elevation in immature granulocytes is non specific and   can be seen in a variety of conditions including stress response,   acute inflammation, trauma and pregnancy. Correlation with other   laboratory and clinical findings is essential.    Result canceled by the ancillary.         Immature Granulocytes   CANCELED  Comment:  Result canceled by the ancillary.       INDIRECT DHAVAL   NEG       Lymph%   9.0       MCH   21.7       MCHC   31.1       MCV   70       Metamyelocytes   2.0       Mono%   4.0       MPV   10.4       nRBC   0       Platelet Estimate   Appears normal       Platelets   212       RBC   3.82       RDW   16.1       SARS-CoV-2 RNA, Amplification, Qual Negative  Comment:  This test utilizes isothermal  nucleic acid amplification   technology to detect the SARS-CoV-2 RdRp nucleic acid segment.   The analytical sensitivity (limit of detection) is 125 genome   equivalents/mL.   A POSITIVE result implies infection with the SARS-CoV-2 virus;  the patient is presumed to be contagious.    A NEGATIVE result means that SARS-CoV-2 nucleic acids are not  present above the limit of detection. A NEGATIVE result should be   treated as presumptive. It does not rule out the possibility of   COVID-19 and should not be the sole basis for treatment decisions.   If COVID-19 is strongly suspected based on clinical and exposure   history, re-testing using an alternate molecular assay should be   considered.   This test is only for use under the Food and Drug   Administration s Emergency Use Authorization (EUA).   Commercial kits are provided by [employer].   Performance characteristics of the EUA have been independently  verified by [facility] Department of  Pathology and Laboratory Medicine.   _________________________________________________________________  The ID NOW COVID-19 Letter of Authorization, along with the   authorized Fact Sheet for Healthcare Providers, the authorized Fact  Sheet for Patients, and authorized labeling are available on the FDA   website:  www.fda.gov/MedicalDevices/Safety/EmergencySituations/dyy346651.htm           WBC   10.25             Physical Exam:   Constitutional: She is oriented to person, place, and time. She appears well-developed and well-nourished.    HENT:   Head: Normocephalic and atraumatic.    Eyes: Pupils are equal, round, and reactive to light. EOM are normal.    Neck: Normal range of motion.    Cardiovascular: Normal rate.     Pulmonary/Chest: Effort normal.   Normal work of breathing          Abdominal: Soft. She exhibits no distension. There is no abdominal tenderness. There is no rebound and no guarding.             Musculoskeletal: Normal range of motion. No  tenderness.       Neurological: She is alert and oriented to person, place, and time.    Skin: Skin is warm and dry.

## 2020-07-23 NOTE — H&P
[facility]  Obstetrics  History & Physical    Patient Name: Fany May  MRN: [id number]  Admission Date: 2020  Primary Care Provider: Primary Doctor No    Subjective:     Principal Problem: premature rupture of membranes (PPROM) with unknown onset of labor    History of Present Illness:  Fany May is a 25 y.o. [id number] at 31w4d with di-di twin pregnancy who presents from [facility] clinic for concern for rupture of membranes. Patient endorses loss of fluid since , when she presented to the [facility] and had a negative rupture exam, bedside ultrasound demonstrated membrane with fluid pockets on both sides. She also endorses contractions that have increased in frequency and intensity. Denies vaginal bleeding,   Fetal Movement: normal.     MFM: BPP of 8/8 for A and B. Multiple sites of sac separation between twin A and twin B were found on MFM ultrasound concerning for chorioamniotic membrane separation and PPROM.     With MFM ultrasound and given patient's self described concerns about PPROM, this fits known data   concerning pPROM of one or both sacs confined by membranes.     Pregnancy also complicated by FGR 8th percentile A , B 11%.        Obstetric HPI:  Patient reports contractions, denies vaginal bleeding, reports LOF.   Fetal Movement: normal.         Objective:     Vital Signs (Most Recent):  Temp: 98.8 °F (37.1 °C) (20 1500)  Pulse: 106 (20 1500)  Resp: 14 (20 1500)  BP: (!) 90/50 (20 1500)  SpO2: 100 % (20 1500) Vital Signs (24h Range):  Temp:  [98.8 °F (37.1 °C)] 98.8 °F (37.1 °C)  Pulse:  [] 106  Resp:  [14-16] 14  SpO2:  [100 %] 100 %  BP: ()/(50-59) 90/50     Weight: 50.8 kg (112 lb)  Body mass index is 21.87 kg/m².    FHT:    A: 130 moderate variability, + accels   B: 130 moderate variability, + accels, - decels    (reassuring)  TOCO:  Q 2-10 minutes      Cervical Exam:  Dilation:  2  Effacement:   50%  Station: -3  Presentation: Vertex/Transverse per bedside ultrasound in the [facility]      Significant Labs:  Recent Lab Results       20  1406   20  1250   20  1239        Aniso   Slight       Basophil%   0.0       Differential Method   Manual       Eosinophil%   0.0       Fetal Fibronectin     Positive     Gran%   85.0       Group & Rh   B POS       Hematocrit   26.7       Hemoglobin   8.3       Hypo   Occasional       Immature Granulocytes   CANCELED  Comment:  Result canceled by the ancillary.       INDIRECT DHAVAL   NEG       Lymph%   9.0       MCH   21.7       MCHC   31.1       MCV   70       Metamyelocytes   2.0       Mono%   4.0       MPV   10.4       nRBC   0       Platelet Estimate   Appears normal       Platelets   212       RBC   3.82       RDW   16.1       WBC   10.25             Physical Exam:   Constitutional: She is oriented to person, place, and time. She appears well-developed and well-nourished.    HENT:   Head: Normocephalic and atraumatic.    Eyes: Pupils are equal, round, and reactive to light. EOM are normal.    Neck: Normal range of motion.    Cardiovascular: Normal rate.     Pulmonary/Chest: Effort normal.   Normal work of breathing          Abdominal: Soft. She exhibits no distension. There is no abdominal tenderness. There is no rebound and no guarding.             Musculoskeletal: Normal range of motion. No tenderness.       Neurological: She is alert and oriented to person, place, and time.    Skin: Skin is warm and dry.        Assessment/Plan:     25 y.o. female  at 31w4d for:    *  premature rupture of membranes (PPROM) with unknown onset of labor  - presented to the [facility] with concern for gush of fluid, negative rupture exam, bedside ultrasound demonstrated two pockets of fluid across the visible membrane in both A and B MVP > 2  - presented to [facility] appointment complaining of continued trickling since  clear fluid  - M ultrasound  demonstrated multiple sites of sac separation in this US, concerning for CMS (chorioamniotic membrane separation). Given patient's self described concerns about PPROM (see note from OB ED on ), this fits known data concerning PPROM of one or both sacs confined by membranes  -: In [facility], rupture exam negative, bedside ultrasound revealed same packet of fluid between the two amniotic membranes  -discussed with MFM, decision to admit to antepartum  PPROM at 31w4d   - Start PPROM Abx       - Azithromycin 1000mg PO x 2 - Days 1 & 5        - Ampicillin 2g IV Q6 x 48 hours        - Amoxicillin 500mg PO TID Days 3-10  - Monitor maternal status closely for signs of placental abruption or infection.    31 weeks gestation of pregnancy  - Consents for Delivery and Blood signed  - Administer course of BMZ for fetal lung maturity. Dose 1   - Will collect GBS culture   - Monitor fetal status closely - Fetal position confirmed as cephalic/breech.   - NST on admit is reactive and reassuring  -[place]: contractions q3-10m  -Last US on  (today); EFW was A: 1340 (8th percent), B 1427 g (11%).   -As EFW < 1500, patient would be for    -Mag for fetal neuroprotection while patient contraction or making cervical change. If contractions stop or she has had no cervical dilation for 12-24 hours, will re-evaluate magnesium.  -NPO, continuous fetal monitoring    Iron deficiency anemia  -H/H on admit ..    Desires immediate post-placental Mirena  -medicaid  -will order if patient proceeds with delivery    Sickle cell trait  -H/H on admit ..    Naima Aponte MD  OBGYN PGY-2

## 2020-07-23 NOTE — PROGRESS NOTES
[facility]  Obstetrics  Antepartum Progress Note    Patient Name: Fany May  MRN: [id number]  Admission Date: 2020  Hospital Length of Stay: 0 days  Attending Physician: Marcos Hudson MD  Primary Care Provider: Primary Doctor No    Subjective:     Principal Problem: premature rupture of membranes (PPROM) with unknown onset of labor    HPI:  Fany May is a 25 y.o. [id number] at 31w4d with di-di twin pregnancy who presents from [facility] clinic for concern for rupture of membranes. Patient endorses loss of fluid since , when she presented to the [facility] and had a negative rupture exam, bedside ultrasound demonstrated membrane with fluid pockets on both sides. She also endorses contractions that have increased in frequency and intensity. Denies vaginal bleeding,   Fetal Movement: normal.     MFM: BPP of 8/8 for A and B. Multiple sites of sac separation between twin A and twin B were found on MFM ultrasound concerning for chorioamniotic membrane separation and PPROM.     With MFM ultrasound and given patient's self described concerns about PPROM, this fits known data   concerning pPROM of one or both sacs confined by membranes.     Pregnancy also complicated by FGR 8th percentile A , B 11%.      Obstetric HPI:  Patient reports contractions, denies vaginal bleeding, reports LOF.   Fetal Movement: normal.         Objective:     Vital Signs (Most Recent):  Temp: 98.8 °F (37.1 °C) (20 1500)  Pulse: 106 (20 1500)  Resp: 14 (20 1500)  BP: (!) 90/50 (20 1500)  SpO2: 100 % (20 1500) Vital Signs (24h Range):  Temp:  [98.8 °F (37.1 °C)] 98.8 °F (37.1 °C)  Pulse:  [] 106  Resp:  [14-16] 14  SpO2:  [100 %] 100 %  BP: ()/(50-59) 90/50     Weight: 50.8 kg (112 lb)  Body mass index is 21.87 kg/m².    FHT:    A: 130 moderate variability, + accels   B: 130 moderate variability, + accels, - decels    (reassuring)  TOCO:  Q 2-10 minutes      Cervical  Exam:  Dilation:  2  Effacement:  50%  Station: -3  Presentation: Vertex/Transverse per bedside ultrasound in the [facility]      Significant Labs:  Recent Lab Results       20  1406   20  1250   20  1239        Aniso   Slight       Basophil%   0.0       Differential Method   Manual       Eosinophil%   0.0       Fetal Fibronectin     Positive     Gran%   85.0       Group & Rh   B POS       Hematocrit   26.7       Hemoglobin   8.3       Hypo   Occasional       Immature Granulocytes   CANCELED  Comment:  Result canceled by the ancillary.       INDIRECT DHAVAL   NEG       Lymph%   9.0       MCH   21.7       MCHC   31.1       MCV   70       Metamyelocytes   2.0       Mono%   4.0       MPV   10.4       nRBC   0       Platelet Estimate   Appears normal       Platelets   212       RBC   3.82       RDW   16.1       WBC   10.25             Physical Exam:   Constitutional: She is oriented to person, place, and time. She appears well-developed and well-nourished.    HENT:   Head: Normocephalic and atraumatic.    Eyes: Pupils are equal, round, and reactive to light. EOM are normal.    Neck: Normal range of motion.    Cardiovascular: Normal rate.     Pulmonary/Chest: Effort normal.   Normal work of breathing          Abdominal: Soft. She exhibits no distension. There is no abdominal tenderness. There is no rebound and no guarding.             Musculoskeletal: Normal range of motion. No tenderness.       Neurological: She is alert and oriented to person, place, and time.    Skin: Skin is warm and dry.        Assessment/Plan:     25 y.o. female  at 31w4d for:    31 weeks gestation of pregnancy  - Consents for Delivery and Blood signed  - Administer course of BMZ for fetal lung maturity. Dose 1   - Will collect GBS culture   - Monitor fetal status closely - Fetal position confirmed as cephalic/breech.   - NST on admit is reactive and reassuring  -[place]: contractions q3-10m  -Last US on  (today); EFW  was A: 1340 (8th percent), B 1427 g (11%).   -As EFW < 1500, patient would be for       premature rupture of membranes (PPROM) with unknown onset of labor  - presented to the [facility] with concern for gush of fluid, negative rupture exam, bedside ultrasound demonstrated two pockets of fluid across the visible membrane in both A and B MVP > 2  - presented to MFM appointment complaining of continued trickling since  clear fluid  - MFM ultrasound demonstrated multiple sites of sac separation in this US, concerning for CMS (chorioamniotic membrane separation). Given patient's self described concerns about PPROM (see note from OB ED on ), this fits known data concerning PPROM of one or both sacs confined by membranes  -: In [facility], rupture exam negative, bedside ultrasound revealed same packet of fluid between the two amniotic membranes  -discussed with MFM, decision to admit to antepartum  PPROM at 31w4d   - Start PPROM Abx       - Azithromycin 1000mg PO x 2 - Days 1 & 5        - Ampicillin 2g IV Q6 x 48 hours        - Amoxicillin 500mg PO TID Days 3-10  -Of note, patient prefers liquid over pills and these have all been ordered as syrups  - Monitor maternal status closely for signs of placental abruption or infection.    Iron deficiency anemia  -H/H on admit 8.2/26.7    Desires immediate post-placental Mirena  -medicaid  -will order if patient proceeds with delivery    Sickle cell trait    Naima Aponte MD  OBGYN PGY-2

## 2020-07-23 NOTE — ED PROVIDER NOTES
Encounter Date: 2020       History     Chief Complaint   Patient presents with    Contractions    Rupture of Membranes     26 yo  @ 31+ wga with di/di twin pregnancy sent from [facility] for probably PPROM        Review of patient's allergies indicates:  No Known Allergies  Past Medical History:   Diagnosis Date    Sickle cell trait      No past surgical history on file.  No family history on file.  Social History     Tobacco Use    Smoking status: Never Smoker    Smokeless tobacco: Never Used   Substance Use Topics    Alcohol use: Not Currently    Drug use: Never     Review of Systems   Constitutional: Negative for fever.   HENT: Negative for sore throat.    Respiratory: Negative for shortness of breath.    Cardiovascular: Negative for chest pain.   Gastrointestinal: Positive for abdominal pain. Negative for nausea.   Genitourinary: Positive for vaginal discharge. Negative for dysuria and vaginal bleeding.   Musculoskeletal: Negative for back pain.   Skin: Negative for rash.   Neurological: Negative for weakness and headaches.   Hematological: Does not bruise/bleed easily.   Psychiatric/Behavioral: Negative.        Physical Exam     Initial Vitals   BP Pulse Resp Temp SpO2   -- -- -- -- --      MAP       --       BP (!) 105/57 (BP Location: Right arm, Patient Position: Lying)   Pulse 99   Temp 98.7 °F (37.1 °C) (Oral)   Resp 16   Ht 5' (1.524 m)   Wt 50.8 kg (112 lb)   LMP 12/15/2019   SpO2 97%   Breastfeeding No   BMI 21.87 kg/m²     Physical Exam    Constitutional: She appears well-developed and well-nourished. No distress.   HENT:   Head: Normocephalic and atraumatic.   Cardiovascular: Normal rate and regular rhythm.   Pulmonary/Chest: No respiratory distress.   Abdominal: Soft. There is no abdominal tenderness.   Gravid; contractions palpated   Musculoskeletal: No tenderness or edema.   Neurological: She is alert and oriented to person, place, and time.   Skin: Skin is warm and dry.  Capillary refill takes less than 2 seconds.   Psychiatric: She has a normal mood and affect. Her behavior is normal. Judgment and thought content normal.     OB LABOR EXAM:       Method: Sterile speculum exam per MD and Sterile vaginal exam per MD.   Vaginal Bleeding: none present.     Dilatation: 2.   Station: -2.   Effacement: 60%.   Amniotic Fluid Color: no fluid.     Comments: Sterile speculum exam: negative pooling; negative nitrazine; negative valsalva; + white milkly discharge; no odor.  Cervix: 2/60/-2 soft, posterior; Twin A cephalic       ED Course   Procedures  Labs Reviewed   FETAL FIBRONECTIN   CBC W/ AUTO DIFFERENTIAL   TYPE & SCREEN          Imaging Results    None          Medical Decision Making:   History:   Old Medical Records: I decided to obtain old medical records.  Old Records Summarized: records from clinic visits and records from previous admission(s).       <> Summary of Records: Prenatal records reviewed; MFM noted from today reviewed.   Initial Assessment:   PPROM twin A  Threatened  labor  Clinical Tests:   Lab Tests: Ordered and Reviewed  The following lab test(s) were unremarkable: CBC and Urinalysis  Medical Tests: Ordered and Reviewed  ED Management:  Pt evaluated; SSE negative for PPROM but FFN was positive and ultrasound findings suggest Twin A leaking fluid between the sack of A & B.     Will admit for PPROM antibiotics and evaluated for threatened PTL.  Other:   I have discussed this case with another health care provider.       <> Summary of the Discussion: Dr. Delgadillo, [facility]                                 Clinical Impression:       ICD-10-CM ICD-9-CM   1. Premature rupture of membranes, unspecified duration to onset of labor, unspecified gestational age  O42.90 658.10   2. 31 weeks gestation of pregnancy  Z3A.31 V22.2   3.  premature rupture of membranes (PPROM) with unknown onset of labor  O42.919 658.10                                Yulisa Olguin,  MD  07/27/20 2000

## 2020-07-23 NOTE — ASSESSMENT & PLAN NOTE
-7/16 presented to the [facility] with concern for gush of fluid, negative rupture exam, bedside ultrasound demonstrated two pockets of fluid across the visible membrane in both A and B MVP > 2  -7/23 presented to MFM appointment complaining of continued trickling since 7/16 clear fluid  -7/23 MFM ultrasound demonstrated multiple sites of sac separation in this US, concerning for CMS (chorioamniotic membrane separation). Given patient's self described concerns about PPROM (see note from OB ED on 07/16), this fits known data concerning PPROM of one or both sacs confined by membranes  -7/23: In [facility], rupture exam negative, bedside ultrasound revealed same packet of fluid between the two amniotic membranes  -discussed with MFM, decision to admit to antepartum  PPROM at 31w4d   - Start PPROM Abx       - Azithromycin 1000mg PO x 2 - Days 1 & 5        - Ampicillin 2g IV Q6 x 48 hours        - Amoxicillin 500mg PO TID Days 3-10  - Monitor maternal status closely for signs of placental abruption or infection.

## 2020-07-23 NOTE — HPI
Fany May is a 25 y.o. [id number] at 31w4d with di-di twin pregnancy who presents from [facility] clinic for concern for rupture of membranes. Patient endorses loss of fluid since , when she presented to the [facility] and had a negative rupture exam, bedside ultrasound demonstrated membrane with fluid pockets on both sides. She also endorses contractions that have increased in frequency and intensity. Denies vaginal bleeding,   Fetal Movement: normal.     MFM: BPP of 8/8 for A and B. Multiple sites of sac separation between twin A and twin B were found on MFM ultrasound concerning for chorioamniotic membrane separation and PPROM.     With MFM ultrasound and given patient's self described concerns about PPROM, this fits known data   concerning pPROM of one or both sacs confined by membranes.     Pregnancy also complicated by FGR 8th percentile A , B 11%.

## 2020-07-23 NOTE — PROGRESS NOTES
Indication  ========    Evaluation of fetal growth    History  ======    Previous Outcomes   2  Para 1  Preg. no. 1  Outcome: live birth  Date: 18  Gest. age 39 w + 0 d  Gender: female  Details: , high bp, blood loss  Risk Factors  Details: Sickle cell trait positive    Fetal Growth Overview (Fetus 1)  ========================    Exam date        GA              BPD (mm)         HC (mm)        AC (mm)        FL (mm)         HL (mm)        EFW (g)  2020        19w 4d        45.3                  165.4             138.4             28.6              28.0               273  2020w 4d        57.4                  211.6             177.6             40.1                                   541     23%  2020w 4d        70.2                  249.2             216.6             47.9                                   919    24%  2020w 4d        79.4                  290.2             238.6             55.5                                   1,340    8%      Fetal Growth Overview (Fetus 2)  ========================    Exam date        GA              BPD (mm)         HC (mm)        AC (mm)        FL (mm)         HL (mm)        EFW (g)  2020        19w 4d        42.6                  159.9             137.4             29.6              28.4               274  2020w 4d        55.0                  207.9             180.0             40.3                                   546    24%  2020w 4d        67.7                  253.3             219.5             47.5                                   927    24%  2020w 4d        79.2                  292.6             254.3             53.6                                   1,427    11%      Method  ======    Transabdominal ultrasound examination. View: Good view    Pregnancy  =========    Twin pregnancy. Dichorionic-diamniotic. Number of fetuses:  2    Dating  ======    Cycle: regular cycle, regular cycle, regular cycle  GA by prior assessment 31 w + 4 d  ELLIOT by prior assessment: 9/20/2020  Ultrasound examination on: 7/23/2020  GA by U/S based upon: AC, BPD, Femur, HC  GA by U/S 30 w + 2 d  ELLIOT by U/S: 9/29/2020  GA by U/S based upon (Fetus 2): AC, BPD, Femur, HC  GA by U/S (Fetus 2) 30 w + 4 d  ELLIOT by U/S (Fetus 2): 9/27/2020  Assigned: based on stated ELLIOT, selected on 03/3/2020  Assigned GA 31 w + 4 d  Assigned ELLIOT: 9/20/2020  Pregnancy length 280 d    General Evaluation (Fetus 1)  =====================    Cardiac activity present.  bpm.  Fetal movements visualized.  Presentation cephalic left.  Placenta anterior.  Amniotic fluid Amount of AF: normal amount. MVP 3.6 cm.    Biophysical Profile (Fetus 1)  =====================    2: Fetal breathing movements  2: Gross body movements  2: Fetal tone  2: Amniotic fluid volume  8/8 Biophysical profile score  Interpretation: normal    Fetal Biometry (Fetus 1)  ===================    Standard  BPD 79.4 mm  31w 6d                Hadlock    .3 mm  33w 1d                Tati    .2 mm  32w 0d                Hadlock    .6 mm  28w 1d                Hadlock    Femur 55.5 mm  29w 2d                Hadlock    HC / AC 1.22    EFW 1,340 g          8%        Lalo    EFW discordance 6.1 %  EFW (lb) 2 lb  EFW (oz) 15 oz  EFW by: Hadlock (BPD-HC-AC-FL)  Head / Face / Neck  Cephalic index 0.78    Extremities / Bony Struc  FL / BPD 0.70    FL / HC 0.19    FL / AC 0.23    Other Structures   bpm    Fetal Anatomy (Fetus 1)  ===================    Cranium: normal  4-chamber view: normal  Stomach: normal  Kidneys: normal  Bladder: normal  Gender: female  Wants to know gender: yes    Fetal Doppler (Fetus 1)  ==================    Arterial  Umbilical A RI 0.65          65%        Elida    Umbilical A PS 38.60 cm/s    Umbilical A ED 14.10 cm/s  Umbilical A S / D 2.86          58%         Elida    Impression: normal umbilical artery blood flow (S/D ratio)    General Evaluation (Fetus 2)  =====================    Cardiac activity present.  bpm.  Fetal movements visualized.  Presentation breech right.  Placenta posterior.  Amniotic fluid normal amount, MVP 4.4 cm.    Biophysical Profile (Fetus 2)  =====================    2: Fetal breathing movements  2: Gross body movements  2: Fetal tone  2: Amniotic fluid volume  8/8 Biophysical profile score  Interpretation: normal    Fetal Biometry (Fetus 2)  ===================    Standard  BPD 79.2 mm  31w 6d                Hadlock    .2 mm  34w 4d                Tati    .6 mm  32w 2d                Hadlock    .3 mm  29w 4d                Hadlock    Femur 53.6 mm  28w 3d                Hadlock    HC / AC 1.15    EFW 1,427 g          11%        Lalo    EFW discordance 6.1 %  EFW (lb) 3 lb  EFW (oz) 2 oz  EFW by: Hadlock (BPD-HC-AC-FL)  Head / Face / Neck  Cephalic index 0.75    Extremities / Bony Struc  FL / BPD 0.68    FL / HC 0.18    FL / AC 0.21    Other Structures   bpm    Fetal Anatomy (Fetus 2)  ===================    4-chamber view: documented previously  Stomach: normal  Kidneys: normal  Bladder: normal  Gender: female  Wants to know gender: yes    Fetal Doppler (Fetus 2)  ==================    Arterial  Umbilical A RI 0.69          81%        Elida    Umbilical A PS 46.65 cm/s    Umbilical A ED 15.44 cm/s  Umbilical A S / D 3.31          79%        Elida    Impression: normal umbilical artery blood flow (S/D ratio)          Consultation  ==========    On today's ultrasound exam, IUGR is diagnosed in Angelia Twin gestation. (see below) 'A' 8th, 'B'11th (not technically IUGR but trending with  twin)    For each:  No fetal structural abnormalities are identified, and the amniotic fluid volume is normal. A biophysical profile was performed, and the score was  reassuring at 8/8. Umbilical artery Doppler studies were  also performed, and the S/D ratio is normal. Patient is very small; this is a twin  gestation. No other fetal or maternal risk factors for fetal IUGR are identified today.          Follow-up surveillance in light of the diagnosis of IUGR is as follows:    1. Weekly umbilical artery Doppler studies with BPP  2. Weekly antepartum testing for fetal well-being with NST  3. Reassessment of fetal growth every 3 weeks  4. If not delivered for other reaons; recommend delivery by 36 weeks' or as clinically indicated.            I spent 40 minutes in patient care management and consultation with >50% face to face.          Impression  =========    Angelia Twin Gestation    Growth performed today. Twin 'A' is at 8th percentile. Twin 'B' is at 11th percentile.  Limited anatomy WNL.    For each:  MVP normal.  BPP 8 of 8  Fetal UA Doppler S/D ratio is normal      There are multiple sites of sac separation in this US. Given patient's self described concerns about PPROM (see note from OB ED on 07/16),  this fits known data concerning PPROM of one or both sacs confined by membranes.    After discussion regarding IUGR and CMS (chorioamniotic membrane separation), I sent the couple to PNT for NST to be followed by injection  Center for steroid initiation. However, she was contractin q. 2 - 4 minutes and uncomfortable, so she is sent to OB ED for evaluation and likely  admission.            Recommendation  ==============    See above. If patient is discharged, please re-institute IUGR protocol.

## 2020-07-24 PROCEDURE — 99232 SBSQ HOSP IP/OBS MODERATE 35: CPT | Mod: 25,,, | Performed by: OBSTETRICS & GYNECOLOGY

## 2020-07-24 PROCEDURE — 99232 PR SUBSEQUENT HOSPITAL CARE,LEVL II: ICD-10-PCS | Mod: 25,,, | Performed by: OBSTETRICS & GYNECOLOGY

## 2020-07-24 PROCEDURE — 59025 PR FETAL 2N-STRESS TEST: ICD-10-PCS | Mod: 26,59,, | Performed by: OBSTETRICS & GYNECOLOGY

## 2020-07-24 PROCEDURE — 63600175 PHARM REV CODE 636 W HCPCS: Performed by: STUDENT IN AN ORGANIZED HEALTH CARE EDUCATION/TRAINING PROGRAM

## 2020-07-24 PROCEDURE — 25000003 PHARM REV CODE 250: Performed by: STUDENT IN AN ORGANIZED HEALTH CARE EDUCATION/TRAINING PROGRAM

## 2020-07-24 PROCEDURE — 96372 THER/PROPH/DIAG INJ SC/IM: CPT

## 2020-07-24 PROCEDURE — 59025 FETAL NON-STRESS TEST: CPT

## 2020-07-24 PROCEDURE — 11000001 HC ACUTE MED/SURG PRIVATE ROOM

## 2020-07-24 PROCEDURE — 59025 FETAL NON-STRESS TEST: CPT | Mod: 26,59,, | Performed by: OBSTETRICS & GYNECOLOGY

## 2020-07-24 RX ORDER — SIMETHICONE 80 MG
1 TABLET,CHEWABLE ORAL 3 TIMES DAILY PRN
Status: DISCONTINUED | OUTPATIENT
Start: 2020-07-24 | End: 2020-08-10 | Stop reason: SDUPTHER

## 2020-07-24 RX ORDER — MELATONIN 1 MG/ML
5 LIQUID (ML) ORAL NIGHTLY PRN
Status: DISCONTINUED | OUTPATIENT
Start: 2020-07-24 | End: 2020-07-24

## 2020-07-24 RX ORDER — ACETAMINOPHEN 650 MG/20.3ML
650 LIQUID ORAL EVERY 6 HOURS PRN
Status: DISCONTINUED | OUTPATIENT
Start: 2020-07-24 | End: 2020-08-10

## 2020-07-24 RX ORDER — DOCUSATE SODIUM 50 MG/5ML
200 LIQUID ORAL DAILY
Status: DISCONTINUED | OUTPATIENT
Start: 2020-07-24 | End: 2020-08-10

## 2020-07-24 RX ORDER — ACETAMINOPHEN 650 MG/20.3ML
100 LIQUID ORAL EVERY 6 HOURS PRN
Status: DISCONTINUED | OUTPATIENT
Start: 2020-07-24 | End: 2020-07-24

## 2020-07-24 RX ORDER — FERROUS SULFATE 300 MG/5ML
300 LIQUID (ML) ORAL DAILY
Status: DISCONTINUED | OUTPATIENT
Start: 2020-07-24 | End: 2020-07-28

## 2020-07-24 RX ORDER — MELATONIN 1 MG/ML
5 LIQUID (ML) ORAL NIGHTLY PRN
Status: DISCONTINUED | OUTPATIENT
Start: 2020-07-24 | End: 2020-08-12 | Stop reason: HOSPADM

## 2020-07-24 RX ORDER — FERROUS SULFATE 325(65) MG
325 TABLET, DELAYED RELEASE (ENTERIC COATED) ORAL DAILY
Status: DISCONTINUED | OUTPATIENT
Start: 2020-07-24 | End: 2020-07-24

## 2020-07-24 RX ORDER — DOCUSATE SODIUM 100 MG/1
200 CAPSULE, LIQUID FILLED ORAL 2 TIMES DAILY
Status: DISCONTINUED | OUTPATIENT
Start: 2020-07-24 | End: 2020-07-24

## 2020-07-24 RX ADMIN — AMPICILLIN SODIUM 2 G: 2 INJECTION, POWDER, FOR SOLUTION INTRAMUSCULAR; INTRAVENOUS at 02:07

## 2020-07-24 RX ADMIN — AMPICILLIN SODIUM 2 G: 2 INJECTION, POWDER, FOR SOLUTION INTRAMUSCULAR; INTRAVENOUS at 09:07

## 2020-07-24 RX ADMIN — Medication 5 MG: at 11:07

## 2020-07-24 RX ADMIN — MINERAL SUPPLEMENT IRON 300 MG / 5 ML STRENGTH LIQUID 100 PER BOX UNFLAVORED 300 MG: at 01:07

## 2020-07-24 RX ADMIN — ACETAMINOPHEN 650 MG: 160 SOLUTION ORAL at 11:07

## 2020-07-24 RX ADMIN — DIPHENHYDRAMINE HYDROCHLORIDE 25 MG: 50 INJECTION, SOLUTION INTRAMUSCULAR; INTRAVENOUS at 11:07

## 2020-07-24 RX ADMIN — AMPICILLIN SODIUM 2 G: 2 INJECTION, POWDER, FOR SOLUTION INTRAMUSCULAR; INTRAVENOUS at 08:07

## 2020-07-24 RX ADMIN — BETAMETHASONE SODIUM PHOSPHATE AND BETAMETHASONE ACETATE 12 MG: 3; 3 INJECTION, SUSPENSION INTRA-ARTICULAR; INTRALESIONAL; INTRAMUSCULAR at 01:07

## 2020-07-24 RX ADMIN — AMPICILLIN SODIUM 2 G: 2 INJECTION, POWDER, FOR SOLUTION INTRAMUSCULAR; INTRAVENOUS at 03:07

## 2020-07-24 RX ADMIN — DOCUSATE SODIUM 200 MG: 50 LIQUID ORAL at 01:07

## 2020-07-24 NOTE — SUBJECTIVE & OBJECTIVE
Obstetric HPI:  Patient reports intermittent irregular contractions, denies vaginal bleeding, reports LOF.   Fetal Movement: normal.         Objective:     Vital Signs (Most Recent):  Temp: 98.4 °F (36.9 °C) (07/24/20 0405)  Pulse: 92 (07/24/20 0600)  Resp: 18 (07/24/20 0405)  BP: (!) 98/50 (07/24/20 0530)  SpO2: 98 % (07/24/20 0600) Vital Signs (24h Range):  Temp:  [97.9 °F (36.6 °C)-98.8 °F (37.1 °C)] 98.4 °F (36.9 °C)  Pulse:  [] 92  Resp:  [14-18] 18  SpO2:  [95 %-100 %] 98 %  BP: ()/(50-59) 98/50     Weight: 50.8 kg (112 lb)  Body mass index is 21.87 kg/m².    FHT:    A: 120 moderate variability, + accels, - decels   B: 120 moderate variability, + accels, - decels    (reassuring)  TOCO:  Q 10-20, acontractile      Cervical Exam: checked at 2/60/-3 7/23 at 2050  Dilation:  2  Effacement:  50%  Station: -3   Presentation: Vertex/Transverse per bedside ultrasound in the [facility] 7/23     Significant Labs:  Recent Lab Results       07/23/20  1528   07/23/20  1406   07/23/20  1250   07/23/20  1239        Albumin 2.6           Alkaline Phosphatase 139           ALT 5           Anion Gap 10           Aniso     Slight       AST 12           Basophil%     0.0       BILIRUBIN TOTAL 0.4  Comment:  For infants and newborns, interpretation of results should be based  on gestational age, weight and in agreement with clinical  observations.  Premature Infant recommended reference ranges:  Up to 24 hours.............<8.0 mg/dL  Up to 48 hours............<12.0 mg/dL  3-5 days..................<15.0 mg/dL  6-29 days.................<15.0 mg/dL             BUN, Bld 4           Calcium 8.4           Chloride 107           CO2 21           Creatinine 0.5           Differential Method     Manual       eGFR if  >60           eGFR if non  >60  Comment:  Calculation used to obtain the estimated glomerular filtration  rate (eGFR) is the CKD-EPI equation.              Eosinophil%     0.0        Fetal Fibronectin       Positive     Glucose 95           Gran%     85.0       Group & Rh     B POS       Hematocrit     26.7       Hemoglobin     8.3       Hypo     Occasional       Immature Granulocytes     CANCELED  Comment:  Result canceled by the ancillary.       INDIRECT DHAAVL     NEG       Lymph%     9.0       MCH     21.7       MCHC     31.1       MCV     70       Metamyelocytes     2.0       Mono%     4.0       MPV     10.4       nRBC     0       Platelet Estimate     Appears normal       Platelets     212       Potassium 3.8           PROTEIN TOTAL 6.8           RBC     3.82       RDW     16.1       Sodium 138           WBC     10.25             Physical Exam:   Constitutional: She is oriented to person, place, and time. She appears well-developed and well-nourished.    HENT:   Head: Normocephalic and atraumatic.    Eyes: Pupils are equal, round, and reactive to light. EOM are normal.    Neck: Normal range of motion.    Cardiovascular: Normal rate.     Pulmonary/Chest: Effort normal.   Normal work of breathing          Abdominal: Soft. She exhibits no distension. There is no abdominal tenderness. There is no rebound and no guarding.             Musculoskeletal: Normal range of motion. No tenderness.       Neurological: She is alert and oriented to person, place, and time.    Skin: Skin is warm and dry.

## 2020-07-24 NOTE — HOSPITAL COURSE
"07/24/2020 Overnight patient endorsed increased contractions with pain increasing from 3 to 5. Patient cervix was re-checked and found to be unchanged. Since then, the patient's contractions have spaced out to q10-20 minutes. Patient is still feeling them and rating them at 5/10 pain. Continues to endorse trickling of fluid. Denies vaginal bleeding. Endorses fetal movement.  07/25/2020 Called to patient's room once overnight. Placed on FHT for 1 hour, no contractions noted on monitor. Called out again for a different constant abdominal pain. SSE performed, cervix visually unchanged 1-2 cm, white discharge noted, but no vaginal pooling or fluid noted. Pain relieved by tylenol. Continues to endorse trickling of fluid. Denies vaginal bleeding. Endorses fetal movement.  07/26/2020 NSTs reassuring yesterday; patient denies any pain and did not experience any pain yesterday; endorses fetal movement; no bleeding; no contractions  07/27/2020 NST reassuring yesterday. Pt reports continued minimal leakage of clear fluid, worse when standing or with fetal movement. She complains of burning epigastric pain, worse when laying down. No vaginal bleeding, no contractions.  07/28/2020 NST reassuring yesterday. Patient reports continued minimal leakage of clear fluid, worse when standing & ambulating. Epigastric pain has improved with PPI. Feeling abdominal "tightening" 2 times per day, no regular contractions, no vaginal bleeding, endorses regular fetal movement. Pt complains of occasional mild HA, improves spontaneously. Denies visual changes, CP, SOB, RUQ pain.  07/29/2020 NST reassuring yesterday. Patient feels leakage of fluid has improved. Epigastric pain and HA have resolved. Still feeling abdominal tightening and cramping intermittently, unable to specify frequency, was able to sleep through the night. One episode of increased pelvic pressure at 2000, spec exam was unchanged 2/60/-3. Patient feeling very down about staying in " the hospital for an extended period.   07/30/2020 NST reassuring yesterday. Patient feels leakage of fluid continues to decrease but still present when standing. Complains of intermittent pelvic pressure, worsening over past 12 hours. Patient denying symptoms of depression and declines SSRI at this time.  07/31/2020 Patient felt increasing pelvic pressure last night & placed on monitor, FHT was reactive & reassuring, TOCO with ctx i67-85hht. No abdominal pain/pressure at present. Patient still endorsing mild leakage of fluid. Still having epigastric burning with PO intake, unable to tolerate liquid famotidine but unwilling to try to swallow pills at this time.  08/01/2020: NST reactive, reassuring. Continues to endorse feeling LOF at times. Transfused 1u pRBC for anemia  08/02/2020 NSTreactive reassuring x 2 overnight. Had one episode of vaginal spotting yesterday. Negative speculum exam. Continuous monitoring reassuring.   08/03/2020-08/07/2020     NST r/r. BMZ and latency abx completed. No complaints apart from slow LOF. Continue course.   08/08/2020- Pt complaining of mild pain with ctx on morning NST, [place] with ctx q3-6 mins. SVE 2/60/-3, cervix posterior. PM NST reactive and reassuring, ctx q10 mins.  08/09/2020 - No issues overnight. Pt reports irregular, infrequent ctx. Good FM. Denies vaginal bleeding.

## 2020-07-24 NOTE — PROGRESS NOTES
Pt's afternoon NST completed at 1520 by previous nurse.  NST reactive for both babies.  Asked Dr. Neri if she would like another NST this evening and she said it would be ok to wait until the morning (unless pt's condition changes).

## 2020-07-24 NOTE — ASSESSMENT & PLAN NOTE
- Consents for Delivery and Blood signed  - BMZ course complete -  - GBS neg  - Monitor fetal status closely - fetal position confirmed as cephalic/breech.   - FHT reactive and reassuring, acontractile  - Last US on : EFW was A: 1340 (8th percent), B 1427 g (11%)  - As EFW < 1500, patient would be for  at 34w  - Diet Regular, NST BID

## 2020-07-24 NOTE — PROGRESS NOTES
Patient called out feeling more painful contractions    Repeat SVE performed, still 2/60/-3, very posterior  [place] with ctx q3 minutes    Continue magnesium  Recheck cervix if patient calls out again    Laquita Farias M.D.   OB/GYN  PGY-2

## 2020-07-24 NOTE — PROGRESS NOTES
[facility]  Obstetrics  Antepartum Progress Note    Patient Name: Fany May  MRN: [id number]  Admission Date: 2020  Hospital Length of Stay: 1 days  Attending Physician: Marcos Hudson MD  Primary Care Provider: Primary Doctor No    Subjective:     Principal Problem: premature rupture of membranes (PPROM) with unknown onset of labor    HPI:  Fany May is a 25 y.o. [id number] at 31w4d with di-di twin pregnancy who presents from [facility] clinic for concern for rupture of membranes. Patient endorses loss of fluid since , when she presented to the [facility] and had a negative rupture exam, bedside ultrasound demonstrated membrane with fluid pockets on both sides. She also endorses contractions that have increased in frequency and intensity. Denies vaginal bleeding,   Fetal Movement: normal.     MFM: BPP of 8/8 for A and B. Multiple sites of sac separation between twin A and twin B were found on MFM ultrasound concerning for chorioamniotic membrane separation and PPROM.     With MFM ultrasound and given patient's self described concerns about PPROM, this fits known data   concerning pPROM of one or both sacs confined by membranes.     Pregnancy also complicated by FGR 8th percentile A , B 11%.        Hospital Course:  2020 Overnight patient endorsed increased contractions with pain increasing from 3 to 5. Patient cervix was re-checked and found to be unchanged. Since then, the patient's contractions have spaced out to q10-20 minutes. Patient is still feeling them and rating them at 5/10 pain. Continues to endorse trickling of fluid. Denies vaginal bleeding. Endorses fetal movement.      Obstetric HPI:  Patient reports intermittent irregular contractions, denies vaginal bleeding, reports LOF.   Fetal Movement: normal.         Objective:     Vital Signs (Most Recent):  Temp: 98.4 °F (36.9 °C) (20)  Pulse: 92 (20 0600)  Resp: 18 (20)  BP: (!) 98/50  (07/24/20 0530)  SpO2: 98 % (07/24/20 0600) Vital Signs (24h Range):  Temp:  [97.9 °F (36.6 °C)-98.8 °F (37.1 °C)] 98.4 °F (36.9 °C)  Pulse:  [] 92  Resp:  [14-18] 18  SpO2:  [95 %-100 %] 98 %  BP: ()/(50-59) 98/50     Weight: 50.8 kg (112 lb)  Body mass index is 21.87 kg/m².    FHT:    A: 120 moderate variability, + accels, - decels   B: 120 moderate variability, + accels, - decels    (reassuring)  TOCO:  Q 10-20, acontractile      Cervical Exam: checked at 2/60/-3 7/23 at 2050  Dilation:  2  Effacement:  50%  Station: -3   Presentation: Vertex/Transverse per bedside ultrasound in the [facility] 7/23     Significant Labs:  Recent Lab Results       07/23/20  1528   07/23/20  1406   07/23/20  1250   07/23/20  1239        Albumin 2.6           Alkaline Phosphatase 139           ALT 5           Anion Gap 10           Aniso     Slight       AST 12           Basophil%     0.0       BILIRUBIN TOTAL 0.4  Comment:  For infants and newborns, interpretation of results should be based  on gestational age, weight and in agreement with clinical  observations.  Premature Infant recommended reference ranges:  Up to 24 hours.............<8.0 mg/dL  Up to 48 hours............<12.0 mg/dL  3-5 days..................<15.0 mg/dL  6-29 days.................<15.0 mg/dL             BUN, Bld 4           Calcium 8.4           Chloride 107           CO2 21           Creatinine 0.5           Differential Method     Manual       eGFR if  >60           eGFR if non  >60  Comment:  Calculation used to obtain the estimated glomerular filtration  rate (eGFR) is the CKD-EPI equation.              Eosinophil%     0.0       Fetal Fibronectin       Positive     Glucose 95           Gran%     85.0       Group & Rh     B POS       Hematocrit     26.7       Hemoglobin     8.3       Hypo     Occasional       Immature Granulocytes     CANCELED  Comment:  Result canceled by the ancillary.       INDIRECT DHAVAL     NEG        Lymph%     9.0       MCH     21.7       MCHC     31.1       MCV     70       Metamyelocytes     2.0       Mono%     4.0       MPV     10.4       nRBC     0       Platelet Estimate     Appears normal       Platelets     212       Potassium 3.8           PROTEIN TOTAL 6.8           RBC     3.82       RDW     16.1       Sodium 138           WBC     10.25             Physical Exam:   Constitutional: She is oriented to person, place, and time. She appears well-developed and well-nourished.    HENT:   Head: Normocephalic and atraumatic.    Eyes: Pupils are equal, round, and reactive to light. EOM are normal.    Neck: Normal range of motion.    Cardiovascular: Normal rate.     Pulmonary/Chest: Effort normal.   Normal work of breathing          Abdominal: Soft. She exhibits no distension. There is no abdominal tenderness. There is no rebound and no guarding.             Musculoskeletal: Normal range of motion. No tenderness.       Neurological: She is alert and oriented to person, place, and time.    Skin: Skin is warm and dry.        Assessment/Plan:     25 y.o. female  at 31w5d for:    *  premature rupture of membranes (PPROM) with unknown onset of labor  - presented to the [facility] with concern for gush of fluid, negative rupture exam, bedside ultrasound demonstrated two pockets of fluid across the visible membrane in both A and B MVP > 2  - presented to MFM appointment complaining of continued trickling since  clear fluid  - MFM ultrasound demonstrated multiple sites of sac separation in this US, concerning for CMS (chorioamniotic membrane separation). Given patient's self described concerns about PPROM (see note from OB ED on ), this fits known data concerning PPROM of one or both sacs confined by membranes  -: In [facility], rupture exam negative, bedside ultrasound revealed same packet of fluid between the two amniotic membranes  -discussed with MFM, decision to admit to  antepartum  PPROM at 31w4d   - Start PPROM Abx       - Azithromycin 1000mg PO x 2 - Days 1 & 5        - Ampicillin 2g IV Q6 x 48 hours        - Amoxicillin 500mg PO TID Days 3-10  - Monitor maternal status closely for signs of placental abruption or infection.    31 weeks gestation of pregnancy  - Consents for Delivery and Blood signed  - BMZ , second dose today at 1300  - GBS neg  - Monitor fetal status closely - Fetal position confirmed as cephalic/breech.   -FHT reactive and reassuring, acontractile for over 6 hours  -Last US on  (today); EFW was A: 1340 (8th percent), B 1427 g (11%).   -As EFW < 1500, patient would be for    -Will stop magnesium for fetal neuroprotection as contractions have spaced out and she is now acontractile and her cervix was unchanged for 10 hours.  -Will advance to regular diet and NST bid    Iron deficiency anemia  -H/H on admit 8.2/26.7  -daily iron and scheduled colace    Desires immediate post-placental Mirena  -medicaid  -will order if patient proceeds with delivery    Sickle cell trait      Naima Aponte MD  OBGYN PGY-2

## 2020-07-24 NOTE — ASSESSMENT & PLAN NOTE
-7/16 presented to the [facility] with concern for gush of fluid, negative rupture exam, bedside ultrasound demonstrated two pockets of fluid across the visible membrane in both A and B MVP > 2  -7/23 presented to MFM appointment complaining of continued trickling since 7/16 clear fluid  -7/23 MFM ultrasound demonstrated multiple sites of sac separation in this US, concerning for CMS (chorioamniotic membrane separation). Given patient's self described concerns about PPROM (see note from OB ED on 07/16), this fits known data concerning PPROM of one or both sacs confined by membranes  -7/23: In [facility], rupture exam negative, bedside ultrasound revealed same pocket of fluid between the two amniotic membranes  -discussed with MFM, decision to admit to antepartum  PPROM at 31w4d   - Start PPROM Abx       - Azithromycin 1000mg PO x 2 - Days 1 & 5        - Ampicillin 2g IV Q6 x 48 hours        - Amoxicillin 500mg PO TID Days 3-10  - Monitor maternal status closely for signs of placental abruption or infection.

## 2020-07-24 NOTE — PROGRESS NOTES
[facility]  Obstetrics  Antepartum Progress Note    Patient Name: Fany May  MRN: [id number]  Admission Date: 2020  Hospital Length of Stay: 1 days  Attending Physician: Marcos Hudson MD  Primary Care Provider: Primary Doctor No    Subjective:     Principal Problem: premature rupture of membranes (PPROM) with unknown onset of labor    HPI:  Fany May is a 25 y.o. [id number] at 31w4d with di-di twin pregnancy who presents from [facility] clinic for concern for rupture of membranes. Patient endorses loss of fluid since , when she presented to the [facility] and had a negative rupture exam, bedside ultrasound demonstrated membrane with fluid pockets on both sides. She also endorses contractions that have increased in frequency and intensity. Denies vaginal bleeding,   Fetal Movement: normal.     MFM: BPP of 8/8 for A and B. Multiple sites of sac separation between twin A and twin B were found on MFM ultrasound concerning for chorioamniotic membrane separation and PPROM.     With MFM ultrasound and given patient's self described concerns about PPROM, this fits known data   concerning pPROM of one or both sacs confined by membranes.     Pregnancy also complicated by FGR 8th percentile A , B 11%.        Hospital Course:  2020 Overnight patient endorsed increased contractions with pain increasing from 3 to 5. Patient cervix was re-checked and found to be unchanged. Since then, the patient's contractions have spaced out to q10-20 minutes. Patient is still feeling them and rating them at 5/10 pain. Continues to endorse trickling of fluid. Denies vaginal bleeding. Endorses fetal movement.      No new subjective & objective note has been filed under this hospital service since the last note was generated.    Assessment/Plan:     25 y.o. female  at 31w5d for:    *  premature rupture of membranes (PPROM) with unknown onset of labor  - presented to the [facility] with concern for  gush of fluid, negative rupture exam, bedside ultrasound demonstrated two pockets of fluid across the visible membrane in both A and B MVP > 2  - presented to MFM appointment complaining of continued trickling since  clear fluid  - MFM ultrasound demonstrated multiple sites of sac separation in this US, concerning for CMS (chorioamniotic membrane separation). Given patient's self described concerns about PPROM (see note from OB ED on ), this fits known data concerning PPROM of one or both sacs confined by membranes  -: In [facility], rupture exam negative, bedside ultrasound revealed same pocket of fluid between the two amniotic membranes  -discussed with MFM, decision to admit to antepartum  PPROM at 31w4d   - Start PPROM Abx       - Azithromycin 1000mg PO x 2 - Days 1 & 5        - Ampicillin 2g IV Q6 x 48 hours        - Amoxicillin 500mg PO TID Days 3-10  - Monitor maternal status closely for signs of placental abruption or infection.    31 weeks gestation of pregnancy  - Consents for Delivery and Blood signed  - BMZ , second dose today at 1300  - GBS neg  - Monitor fetal status closely - fetal position confirmed as cephalic/breech.   - FHT reactive and reassuring, acontractile for over 6 hours  - Last US on : EFW was A: 1340 (8th percent), B 1427 g (11%)  - As EFW < 1500, patient would be for  at 34w  - Will stop magnesium for fetal neuroprotection as contractions have spaced out and she is now acontractile and her cervix was unchanged for 10 hours  - Advance diet    Iron deficiency anemia  - H/H on admit .  - daily iron and scheduled colace    Desires immediate post-placental Mirena  - Medicaid  -will order if patient proceeds with delivery    Sickle cell trait  - H/H on admit ..7                Naima Aponte MD  OBGYN PGY-2

## 2020-07-24 NOTE — ASSESSMENT & PLAN NOTE
- Consents for Delivery and Blood signed  - BMZ , second dose today at 1300  - GBS neg  - Monitor fetal status closely - Fetal position confirmed as cephalic/breech.   -FHT reactive and reassuring, acontractile for over 6 hours  -Last US on  (today); EFW was A: 1340 (8th percent), B 1427 g (11%).   -As EFW < 1500, patient would be for    -Will stop magnesium for fetal neuroprotection as contractions have spaced out and she is now acontractile and her cervix was unchanged for 10 hours.

## 2020-07-25 PROCEDURE — 59025 FETAL NON-STRESS TEST: CPT

## 2020-07-25 PROCEDURE — 99233 PR SUBSEQUENT HOSPITAL CARE,LEVL III: ICD-10-PCS | Mod: 25,,, | Performed by: OBSTETRICS & GYNECOLOGY

## 2020-07-25 PROCEDURE — 59025 FETAL NON-STRESS TEST: CPT | Mod: 26,,, | Performed by: OBSTETRICS & GYNECOLOGY

## 2020-07-25 PROCEDURE — 11000001 HC ACUTE MED/SURG PRIVATE ROOM

## 2020-07-25 PROCEDURE — 63600175 PHARM REV CODE 636 W HCPCS: Performed by: STUDENT IN AN ORGANIZED HEALTH CARE EDUCATION/TRAINING PROGRAM

## 2020-07-25 PROCEDURE — 25000003 PHARM REV CODE 250: Performed by: STUDENT IN AN ORGANIZED HEALTH CARE EDUCATION/TRAINING PROGRAM

## 2020-07-25 PROCEDURE — 59025 PR FETAL 2N-STRESS TEST: ICD-10-PCS | Mod: 26,,, | Performed by: OBSTETRICS & GYNECOLOGY

## 2020-07-25 PROCEDURE — 99233 SBSQ HOSP IP/OBS HIGH 50: CPT | Mod: 25,,, | Performed by: OBSTETRICS & GYNECOLOGY

## 2020-07-25 RX ORDER — CHLOROPROCAINE HYDROCHLORIDE 30 MG/ML
INJECTION, SOLUTION EPIDURAL; INFILTRATION; INTRACAUDAL; PERINEURAL
Status: DISPENSED
Start: 2020-07-25 | End: 2020-07-25

## 2020-07-25 RX ADMIN — AMPICILLIN SODIUM 2 G: 2 INJECTION, POWDER, FOR SOLUTION INTRAMUSCULAR; INTRAVENOUS at 09:07

## 2020-07-25 RX ADMIN — AMOXICILLIN 500 MG: 400 POWDER, FOR SUSPENSION ORAL at 03:07

## 2020-07-25 RX ADMIN — AMOXICILLIN 500 MG: 400 POWDER, FOR SUSPENSION ORAL at 09:07

## 2020-07-25 RX ADMIN — MINERAL SUPPLEMENT IRON 300 MG / 5 ML STRENGTH LIQUID 100 PER BOX UNFLAVORED 300 MG: at 09:07

## 2020-07-25 RX ADMIN — AMPICILLIN SODIUM 2 G: 2 INJECTION, POWDER, FOR SOLUTION INTRAMUSCULAR; INTRAVENOUS at 03:07

## 2020-07-25 NOTE — NURSING
MD Fadi notified that patient is feeling cramping and vaginal pressure. EFM and TOCO applied. Will monitor.

## 2020-07-25 NOTE — PLAN OF CARE
Pt doing well.  VS stable. No acute changes this shift. Pt denies vaginal bleeding  and contractions.  Pt reports positive fetal movement, LOF. Plan of care reviewed with patient. No questions at this time.

## 2020-07-25 NOTE — SUBJECTIVE & OBJECTIVE
Obstetric HPI:  Patient reports intermittent irregular contractions, denies vaginal bleeding, reports LOF.   Fetal Movement: normal.         Objective:     Vital Signs (Most Recent):  Temp: 97.8 °F (36.6 °C) (07/25/20 0410)  Pulse: 93 (07/25/20 0410)  Resp: 16 (07/25/20 0410)  BP: (!) 104/51 (07/25/20 0410)  SpO2: 98 % (07/25/20 0410) Vital Signs (24h Range):  Temp:  [97.8 °F (36.6 °C)-99.4 °F (37.4 °C)] 97.8 °F (36.6 °C)  Pulse:  [] 93  Resp:  [16-18] 16  SpO2:  [95 %-100 %] 98 %  BP: ()/(47-56) 104/51     Weight: 50.8 kg (112 lb)  Body mass index is 21.87 kg/m².    FHT: from 7/24 1 hour monitor at night   A: 125 moderate variability, + accels, - decels   B: 130 moderate variability, + accels, - decels    (reassuring)  TOCO:  no contractions      Cervical Exam: checked at 2/60/-3 7/23 at 2050  Dilation:  2  Effacement:  50%  Station: -3   Presentation: Vertex/Transverse per bedside ultrasound in the [facility] 7/23     Significant Labs:  Recent Lab Results       07/23/20  1528   07/23/20  1406   07/23/20  1250   07/23/20  1239        Albumin 2.6           Alkaline Phosphatase 139           ALT 5           Anion Gap 10           Aniso     Slight       AST 12           Basophil%     0.0       BILIRUBIN TOTAL 0.4  Comment:  For infants and newborns, interpretation of results should be based  on gestational age, weight and in agreement with clinical  observations.  Premature Infant recommended reference ranges:  Up to 24 hours.............<8.0 mg/dL  Up to 48 hours............<12.0 mg/dL  3-5 days..................<15.0 mg/dL  6-29 days.................<15.0 mg/dL             BUN, Bld 4           Calcium 8.4           Chloride 107           CO2 21           Creatinine 0.5           Differential Method     Manual       eGFR if  >60           eGFR if non  >60  Comment:  Calculation used to obtain the estimated glomerular filtration  rate (eGFR) is the CKD-EPI equation.               Eosinophil%     0.0       Fetal Fibronectin       Positive     Glucose 95           Gran%     85.0       Group & Rh     B POS       Hematocrit     26.7       Hemoglobin     8.3       Hypo     Occasional       Immature Granulocytes     CANCELED  Comment:  Result canceled by the ancillary.       INDIRECT DHAVAL     NEG       Lymph%     9.0       MCH     21.7       MCHC     31.1       MCV     70       Metamyelocytes     2.0       Mono%     4.0       MPV     10.4       nRBC     0       Platelet Estimate     Appears normal       Platelets     212       Potassium 3.8           PROTEIN TOTAL 6.8           RBC     3.82       RDW     16.1       Sodium 138           WBC     10.25             Physical Exam:   Constitutional: She is oriented to person, place, and time. She appears well-developed and well-nourished.    HENT:   Head: Normocephalic and atraumatic.    Eyes: Pupils are equal, round, and reactive to light. EOM are normal.    Neck: Normal range of motion.    Cardiovascular: Normal rate.     Pulmonary/Chest: Effort normal.   Normal work of breathing          Abdominal: Soft. She exhibits no distension. There is no abdominal tenderness. There is no rebound and no guarding.             Musculoskeletal: Normal range of motion. No tenderness.       Neurological: She is alert and oriented to person, place, and time.    Skin: Skin is warm and dry.

## 2020-07-25 NOTE — PROGRESS NOTES
[facility]  Obstetrics  Antepartum Progress Note    Patient Name: Fany May  MRN: [id number]  Admission Date: 2020  Hospital Length of Stay: 2 days  Attending Physician: Marcos Hudson MD  Primary Care Provider: Primary Doctor No    Subjective:     Principal Problem: premature rupture of membranes (PPROM) with unknown onset of labor    HPI:  Fnay May is a 25 y.o. [id number] at 31w4d with di-di twin pregnancy who presents from [facility] clinic for concern for rupture of membranes. Patient endorses loss of fluid since , when she presented to the [facility] and had a negative rupture exam, bedside ultrasound demonstrated membrane with fluid pockets on both sides. She also endorses contractions that have increased in frequency and intensity. Denies vaginal bleeding,   Fetal Movement: normal.     MFM: BPP of 8/8 for A and B. Multiple sites of sac separation between twin A and twin B were found on MFM ultrasound concerning for chorioamniotic membrane separation and PPROM.     With MFM ultrasound and given patient's self described concerns about PPROM, this fits known data   concerning pPROM of one or both sacs confined by membranes.     Pregnancy also complicated by FGR 8th percentile A , B 11%.        Hospital Course:  2020 Overnight patient endorsed increased contractions with pain increasing from 3 to 5. Patient cervix was re-checked and found to be unchanged. Since then, the patient's contractions have spaced out to q10-20 minutes. Patient is still feeling them and rating them at 5/10 pain. Continues to endorse trickling of fluid. Denies vaginal bleeding. Endorses fetal movement.  2020 Called to patient's room once overnight. Placed on FHT for 1 hour, no contractions noted on monitor. Called out again for a different constant abdominal pain. SSE performed, cervix visually unchanged 1-2 cm, white discharge noted, but no vaginal pooling or fluid noted. Pain relieved by  tylenol. Continues to endorse trickling of fluid. Denies vaginal bleeding. Endorses fetal movement.    Obstetric HPI:  Patient reports intermittent irregular contractions, denies vaginal bleeding, reports LOF.   Fetal Movement: normal.         Objective:     Vital Signs (Most Recent):  Temp: 97.8 °F (36.6 °C) (07/25/20 0410)  Pulse: 93 (07/25/20 0410)  Resp: 16 (07/25/20 0410)  BP: (!) 104/51 (07/25/20 0410)  SpO2: 98 % (07/25/20 0410) Vital Signs (24h Range):  Temp:  [97.8 °F (36.6 °C)-99.4 °F (37.4 °C)] 97.8 °F (36.6 °C)  Pulse:  [] 93  Resp:  [16-18] 16  SpO2:  [95 %-100 %] 98 %  BP: ()/(47-56) 104/51     Weight: 50.8 kg (112 lb)  Body mass index is 21.87 kg/m².    FHT: from 7/24 1 hour monitor at night   A: 125 moderate variability, + accels, - decels   B: 130 moderate variability, + accels, - decels    (reassuring)  TOCO:  no contractions      Cervical Exam: checked at 2/60/-3 7/23 at 2050  Dilation:  2  Effacement:  50%  Station: -3   Presentation: Vertex/Transverse per bedside ultrasound in the [facility] 7/23     Significant Labs:  Recent Lab Results       07/23/20  1528   07/23/20  1406   07/23/20  1250   07/23/20  1239        Albumin 2.6           Alkaline Phosphatase 139           ALT 5           Anion Gap 10           Aniso     Slight       AST 12           Basophil%     0.0       BILIRUBIN TOTAL 0.4  Comment:  For infants and newborns, interpretation of results should be based  on gestational age, weight and in agreement with clinical  observations.  Premature Infant recommended reference ranges:  Up to 24 hours.............<8.0 mg/dL  Up to 48 hours............<12.0 mg/dL  3-5 days..................<15.0 mg/dL  6-29 days.................<15.0 mg/dL             BUN, Bld 4           Calcium 8.4           Chloride 107           CO2 21           Creatinine 0.5           Differential Method     Manual       eGFR if  >60           eGFR if non  >60  Comment:  Calculation  used to obtain the estimated glomerular filtration  rate (eGFR) is the CKD-EPI equation.              Eosinophil%     0.0       Fetal Fibronectin       Positive     Glucose 95           Gran%     85.0       Group & Rh     B POS       Hematocrit     26.7       Hemoglobin     8.3       Hypo     Occasional       Immature Granulocytes     CANCELED  Comment:  Result canceled by the ancillary.       INDIRECT DHAVAL     NEG       Lymph%     9.0       MCH     21.7       MCHC     31.1       MCV     70       Metamyelocytes     2.0       Mono%     4.0       MPV     10.4       nRBC     0       Platelet Estimate     Appears normal       Platelets     212       Potassium 3.8           PROTEIN TOTAL 6.8           RBC     3.82       RDW     16.1       Sodium 138           WBC     10.25             Physical Exam:   Constitutional: She is oriented to person, place, and time. She appears well-developed and well-nourished.    HENT:   Head: Normocephalic and atraumatic.    Eyes: Pupils are equal, round, and reactive to light. EOM are normal.    Neck: Normal range of motion.    Cardiovascular: Normal rate.     Pulmonary/Chest: Effort normal.   Normal work of breathing          Abdominal: Soft. She exhibits no distension. There is no abdominal tenderness. There is no rebound and no guarding.             Musculoskeletal: Normal range of motion. No tenderness.       Neurological: She is alert and oriented to person, place, and time.    Skin: Skin is warm and dry.        Assessment/Plan:     25 y.o. female  at 31w6d for:    *  premature rupture of membranes (PPROM) with unknown onset of labor  - presented to the [facility] with concern for gush of fluid, negative rupture exam, bedside ultrasound demonstrated two pockets of fluid across the visible membrane in both A and B MVP > 2  - presented to [facility] appointment complaining of continued trickling since  clear fluid  - MF ultrasound demonstrated multiple sites of sac  separation in this US, concerning for CMS (chorioamniotic membrane separation). Given patient's self described concerns about PPROM (see note from OB ED on ), this fits known data concerning PPROM of one or both sacs confined by membranes  -: In [facility], rupture exam negative, bedside ultrasound revealed same pocket of fluid between the two amniotic membranes  -discussed with MFM, decision to admit to antepartum  PPROM at 31w4d   - Start PPROM Abx       - Azithromycin 1000mg PO x 2 - Days 1 & 5        - Ampicillin 2g IV Q6 x 48 hours        - Amoxicillin 500mg PO TID Days 3-10  - Monitor maternal status closely for signs of placental abruption or infection.    31 weeks gestation of pregnancy  - Consents for Delivery and Blood signed  - BMZ course complete -  - GBS neg  - Monitor fetal status closely - fetal position confirmed as cephalic/breech.   - FHT reactive and reassuring, acontractile  - Last US on : EFW was A: 1340 (8th percent), B 1427 g (11%)  - As EFW < 1500, patient would be for  at 34w  - Diet Regular, NST BID    Iron deficiency anemia  - H/H on admit ..7  - daily iron and scheduled colace    Desires immediate post-placental Mirena  - Medicaid  -will order if patient proceeds with delivery    Sickle cell trait  - H/H on admit 8..7          Naima Aponte MD  OBGYN PGY-2

## 2020-07-25 NOTE — PROGRESS NOTES
Patient feeling acute abdominal pain. Patient had felt cramping and vaginal pain earlier in the night. Placed on NST for 1 hour and no contractions noted, and patient endorsed some relief. This new acute abdominal pain was constant and not contractions or cramping per patient. She states it occurred when she was rolling over the go to sleep on her right side and spread across belly.    PE:  Abdomen: palpated soft, no contractions noted  SSE: cervix visually unchanged 1-2 cm, white discharge noted in vagina, most likely leukoria.     A/P:  Tylenol for pain  Melatonin and or benadryl for sleep  Continue to evaluate white discharge. Currently patient denies any symptoms of vaginal itching or irritation    Naima Aponte MD  OBGYN PGY-2

## 2020-07-26 LAB
ABO + RH BLD: NORMAL
BLD GP AB SCN CELLS X3 SERPL QL: NORMAL

## 2020-07-26 PROCEDURE — 59025 PR FETAL 2N-STRESS TEST: ICD-10-PCS | Mod: 26,59,, | Performed by: OBSTETRICS & GYNECOLOGY

## 2020-07-26 PROCEDURE — 25000003 PHARM REV CODE 250: Performed by: STUDENT IN AN ORGANIZED HEALTH CARE EDUCATION/TRAINING PROGRAM

## 2020-07-26 PROCEDURE — 99233 SBSQ HOSP IP/OBS HIGH 50: CPT | Mod: 25,,, | Performed by: OBSTETRICS & GYNECOLOGY

## 2020-07-26 PROCEDURE — 36415 COLL VENOUS BLD VENIPUNCTURE: CPT

## 2020-07-26 PROCEDURE — 11000001 HC ACUTE MED/SURG PRIVATE ROOM

## 2020-07-26 PROCEDURE — 99233 PR SUBSEQUENT HOSPITAL CARE,LEVL III: ICD-10-PCS | Mod: 25,,, | Performed by: OBSTETRICS & GYNECOLOGY

## 2020-07-26 PROCEDURE — 86850 RBC ANTIBODY SCREEN: CPT

## 2020-07-26 PROCEDURE — 59025 FETAL NON-STRESS TEST: CPT | Mod: 26,,, | Performed by: OBSTETRICS & GYNECOLOGY

## 2020-07-26 RX ORDER — MISOPROSTOL 200 UG/1
800 TABLET ORAL ONCE
Status: DISCONTINUED | OUTPATIENT
Start: 2020-07-26 | End: 2020-07-26

## 2020-07-26 RX ADMIN — AMOXICILLIN 500 MG: 400 POWDER, FOR SUSPENSION ORAL at 10:07

## 2020-07-26 RX ADMIN — AMOXICILLIN 500 MG: 400 POWDER, FOR SUSPENSION ORAL at 06:07

## 2020-07-26 RX ADMIN — MINERAL SUPPLEMENT IRON 300 MG / 5 ML STRENGTH LIQUID 100 PER BOX UNFLAVORED 300 MG: at 10:07

## 2020-07-26 RX ADMIN — AMOXICILLIN 500 MG: 400 POWDER, FOR SUSPENSION ORAL at 02:07

## 2020-07-26 NOTE — PROGRESS NOTES
[facility]  Obstetrics  Antepartum Progress Note    Patient Name: Fany May  MRN: [id number]  Admission Date: 2020  Hospital Length of Stay: 3 days  Attending Physician: Marcos Hudson MD  Primary Care Provider: Primary Doctor No    Subjective:     Principal Problem: premature rupture of membranes (PPROM) with unknown onset of labor    HPI:  Fany May is a 25 y.o. [id number] at 31w4d with di-di twin pregnancy who presents from [facility] clinic for concern for rupture of membranes. Patient endorses loss of fluid since , when she presented to the [facility] and had a negative rupture exam, bedside ultrasound demonstrated membrane with fluid pockets on both sides. She also endorses contractions that have increased in frequency and intensity. Denies vaginal bleeding,   Fetal Movement: normal.     MFM: BPP of 8/8 for A and B. Multiple sites of sac separation between twin A and twin B were found on MFM ultrasound concerning for chorioamniotic membrane separation and PPROM.     With MFM ultrasound and given patient's self described concerns about PPROM, this fits known data   concerning pPROM of one or both sacs confined by membranes.     Pregnancy also complicated by FGR 8th percentile A , B 11%.        Hospital Course:  2020 Overnight patient endorsed increased contractions with pain increasing from 3 to 5. Patient cervix was re-checked and found to be unchanged. Since then, the patient's contractions have spaced out to q10-20 minutes. Patient is still feeling them and rating them at 5/10 pain. Continues to endorse trickling of fluid. Denies vaginal bleeding. Endorses fetal movement.  2020 Called to patient's room once overnight. Placed on FHT for 1 hour, no contractions noted on monitor. Called out again for a different constant abdominal pain. SSE performed, cervix visually unchanged 1-2 cm, white discharge noted, but no vaginal pooling or fluid noted. Pain relieved by  tylenol. Continues to endorse trickling of fluid. Denies vaginal bleeding. Endorses fetal movement.  07/26/2020 NSTs reassuring yesterday; patient denies any pain and did not experience any pain yesterday; endorses fetal movement; no bleeding; no contractions    Obstetric HPI:  Patient reports intermittent irregular contractions, denies vaginal bleeding, reports LOF.   Fetal Movement: normal.         Objective:     Vital Signs (Most Recent):  Temp: 98.1 °F (36.7 °C) (07/25/20 2326)  Pulse: 93 (07/25/20 2326)  Resp: 16 (07/25/20 2326)  BP: (!) 96/50 (07/25/20 2326)  SpO2: 99 % (07/25/20 2326) Vital Signs (24h Range):  Temp:  [97.8 °F (36.6 °C)-98.7 °F (37.1 °C)] 98.1 °F (36.7 °C)  Pulse:  [] 93  Resp:  [16-18] 16  SpO2:  [98 %-100 %] 99 %  BP: ()/(47-51) 96/50     Weight: 50.8 kg (112 lb)  Body mass index is 21.87 kg/m².    FHT: from 7/25 1 hour monitor at night   A: 125 moderate variability, + accels, - decels   B: 130 moderate variability, + accels, - decels    (reassuring)  TOCO:  no contractions      Cervical Exam: checked at 2/60/-3 7/23 at 2050  Dilation:  2  Effacement:  50%  Station: -3   Presentation: Vertex/Transverse per bedside ultrasound in the [facility] 7/23     Significant Labs:  Recent Lab Results       07/23/20  1528   07/23/20  1406   07/23/20  1250   07/23/20  1239        Albumin 2.6           Alkaline Phosphatase 139           ALT 5           Anion Gap 10           Aniso     Slight       AST 12           Basophil%     0.0       BILIRUBIN TOTAL 0.4  Comment:  For infants and newborns, interpretation of results should be based  on gestational age, weight and in agreement with clinical  observations.  Premature Infant recommended reference ranges:  Up to 24 hours.............<8.0 mg/dL  Up to 48 hours............<12.0 mg/dL  3-5 days..................<15.0 mg/dL  6-29 days.................<15.0 mg/dL             BUN, Bld 4           Calcium 8.4           Chloride 107           CO2 21            Creatinine 0.5           Differential Method     Manual       eGFR if  >60           eGFR if non  >60  Comment:  Calculation used to obtain the estimated glomerular filtration  rate (eGFR) is the CKD-EPI equation.              Eosinophil%     0.0       Fetal Fibronectin       Positive     Glucose 95           Gran%     85.0       Group & Rh     B POS       Hematocrit     26.7       Hemoglobin     8.3       Hypo     Occasional       Immature Granulocytes     CANCELED  Comment:  Result canceled by the ancillary.       INDIRECT DHAVAL     NEG       Lymph%     9.0       MCH     21.7       MCHC     31.1       MCV     70       Metamyelocytes     2.0       Mono%     4.0       MPV     10.4       nRBC     0       Platelet Estimate     Appears normal       Platelets     212       Potassium 3.8           PROTEIN TOTAL 6.8           RBC     3.82       RDW     16.1       Sodium 138           WBC     10.25             Physical Exam:   Constitutional: She is oriented to person, place, and time. She appears well-developed and well-nourished.    HENT:   Head: Normocephalic and atraumatic.    Eyes: Pupils are equal, round, and reactive to light. EOM are normal.    Neck: Normal range of motion.    Cardiovascular: Normal rate.     Pulmonary/Chest: Effort normal.   Normal work of breathing          Abdominal: Soft. She exhibits no distension. There is no abdominal tenderness. There is no rebound and no guarding.             Musculoskeletal: Normal range of motion. No tenderness.       Neurological: She is alert and oriented to person, place, and time.    Skin: Skin is warm and dry.        Assessment/Plan:     25 y.o. female  at 32w0d for:    *  premature rupture of membranes (PPROM) with unknown onset of labor  - presented to the [facility] with concern for gush of fluid, negative rupture exam, bedside ultrasound demonstrated two pockets of fluid across the visible membrane in both A and B MVP  > 2  - presented to MFM appointment complaining of continued trickling since  clear fluid  - MFM ultrasound demonstrated multiple sites of sac separation in this US, concerning for CMS (chorioamniotic membrane separation). Given patient's self described concerns about PPROM (see note from OB ED on ), this fits known data concerning PPROM of one or both sacs confined by membranes  -: In [facility], rupture exam negative, bedside ultrasound revealed same pocket of fluid between the two amniotic membranes  -discussed with MFM, decision to admit to antepartum  PPROM at 31w4d   - now on D#4 of PPROM antibiotics   - continue to monitor closely for signs of infection     31 weeks gestation of pregnancy  - Consents for Delivery and Blood signed  - BMZ course complete -  - GBS neg  - Monitor fetal status closely - fetal position confirmed as cephalic/breech.   - FHT reactive and reassuring, acontractile  - Last US on : EFW was A: 1340 (8th percent), B 1427 g (11%)  - As EFW < 1500, patient would be for  at 34w  - Diet Regular, NST BID    Iron deficiency anemia  - H/H on admit ..7  - daily iron and scheduled colace    Desires immediate post-placental Mirena  - Medicaid  -will order if patient proceeds with delivery    Sickle cell trait  - H/H on admit ..7              Tricia Arriola MD  Obstetrics  [facility]

## 2020-07-26 NOTE — PLAN OF CARE
Pt doing well.  Vital signs stable. No acute changes this shift. Pt denies vaginal bleeding  and contractions.  Pt reports positive fetal movement. Pt continues to endorse LOF. Plan of care reviewed with patient. All questions and concerns addressed.

## 2020-07-26 NOTE — ASSESSMENT & PLAN NOTE
-7/16 presented to the [facility] with concern for gush of fluid, negative rupture exam, bedside ultrasound demonstrated two pockets of fluid across the visible membrane in both A and B MVP > 2  -7/23 presented to MFM appointment complaining of continued trickling since 7/16 clear fluid  -7/23 MFM ultrasound demonstrated multiple sites of sac separation in this US, concerning for CMS (chorioamniotic membrane separation). Given patient's self described concerns about PPROM (see note from OB ED on 07/16), this fits known data concerning PPROM of one or both sacs confined by membranes  -7/23: In [facility], rupture exam negative, bedside ultrasound revealed same pocket of fluid between the two amniotic membranes  -discussed with MFM, decision to admit to antepartum  PPROM at 31w4d   - now on D#4 of PPROM antibiotics   - continue to monitor closely for signs of infection

## 2020-07-26 NOTE — SUBJECTIVE & OBJECTIVE
Obstetric HPI:  Patient reports intermittent irregular contractions, denies vaginal bleeding, reports LOF.   Fetal Movement: normal.         Objective:     Vital Signs (Most Recent):  Temp: 98.1 °F (36.7 °C) (07/25/20 2326)  Pulse: 93 (07/25/20 2326)  Resp: 16 (07/25/20 2326)  BP: (!) 96/50 (07/25/20 2326)  SpO2: 99 % (07/25/20 2326) Vital Signs (24h Range):  Temp:  [97.8 °F (36.6 °C)-98.7 °F (37.1 °C)] 98.1 °F (36.7 °C)  Pulse:  [] 93  Resp:  [16-18] 16  SpO2:  [98 %-100 %] 99 %  BP: ()/(47-51) 96/50     Weight: 50.8 kg (112 lb)  Body mass index is 21.87 kg/m².    FHT: from 7/25 1 hour monitor at night   A: 125 moderate variability, + accels, - decels   B: 130 moderate variability, + accels, - decels    (reassuring)  TOCO:  no contractions      Cervical Exam: checked at 2/60/-3 7/23 at 2050  Dilation:  2  Effacement:  50%  Station: -3   Presentation: Vertex/Transverse per bedside ultrasound in the [facility] 7/23     Significant Labs:  Recent Lab Results       07/23/20  1528   07/23/20  1406   07/23/20  1250   07/23/20  1239        Albumin 2.6           Alkaline Phosphatase 139           ALT 5           Anion Gap 10           Aniso     Slight       AST 12           Basophil%     0.0       BILIRUBIN TOTAL 0.4  Comment:  For infants and newborns, interpretation of results should be based  on gestational age, weight and in agreement with clinical  observations.  Premature Infant recommended reference ranges:  Up to 24 hours.............<8.0 mg/dL  Up to 48 hours............<12.0 mg/dL  3-5 days..................<15.0 mg/dL  6-29 days.................<15.0 mg/dL             BUN, Bld 4           Calcium 8.4           Chloride 107           CO2 21           Creatinine 0.5           Differential Method     Manual       eGFR if  >60           eGFR if non  >60  Comment:  Calculation used to obtain the estimated glomerular filtration  rate (eGFR) is the CKD-EPI equation.               Eosinophil%     0.0       Fetal Fibronectin       Positive     Glucose 95           Gran%     85.0       Group & Rh     B POS       Hematocrit     26.7       Hemoglobin     8.3       Hypo     Occasional       Immature Granulocytes     CANCELED  Comment:  Result canceled by the ancillary.       INDIRECT DHAVAL     NEG       Lymph%     9.0       MCH     21.7       MCHC     31.1       MCV     70       Metamyelocytes     2.0       Mono%     4.0       MPV     10.4       nRBC     0       Platelet Estimate     Appears normal       Platelets     212       Potassium 3.8           PROTEIN TOTAL 6.8           RBC     3.82       RDW     16.1       Sodium 138           WBC     10.25             Physical Exam:   Constitutional: She is oriented to person, place, and time. She appears well-developed and well-nourished.    HENT:   Head: Normocephalic and atraumatic.    Eyes: Pupils are equal, round, and reactive to light. EOM are normal.    Neck: Normal range of motion.    Cardiovascular: Normal rate.     Pulmonary/Chest: Effort normal.   Normal work of breathing          Abdominal: Soft. She exhibits no distension. There is no abdominal tenderness. There is no rebound and no guarding.             Musculoskeletal: Normal range of motion. No tenderness.       Neurological: She is alert and oriented to person, place, and time.    Skin: Skin is warm and dry.

## 2020-07-26 NOTE — PROGRESS NOTES
Morning NST    A: 130, moderate variability, + accels, - decels  B: 135, moderate variability, + accels, - decels    Both babies reactive and reassuring    [place]: irritability but no contractions    Total monitorin minutes    Naima Aponte MD  OBGYN PGY-2

## 2020-07-27 LAB
FERRITIN SERPL-MCNC: 8 NG/ML (ref 20–300)
IRON SERPL-MCNC: 19 UG/DL (ref 30–160)
POCT GLUCOSE: 117 MG/DL (ref 70–110)
POCT GLUCOSE: 119 MG/DL (ref 70–110)
SATURATED IRON: 3 % (ref 20–50)
TOTAL IRON BINDING CAPACITY: 670 UG/DL (ref 250–450)
TRANSFERRIN SERPL-MCNC: 453 MG/DL (ref 200–375)

## 2020-07-27 PROCEDURE — 76815 OB US LIMITED FETUS(S): CPT | Mod: 26,,, | Performed by: OBSTETRICS & GYNECOLOGY

## 2020-07-27 PROCEDURE — 25000003 PHARM REV CODE 250: Performed by: STUDENT IN AN ORGANIZED HEALTH CARE EDUCATION/TRAINING PROGRAM

## 2020-07-27 PROCEDURE — 59025 FETAL NON-STRESS TEST: CPT | Mod: 26,,, | Performed by: OBSTETRICS & GYNECOLOGY

## 2020-07-27 PROCEDURE — 11000001 HC ACUTE MED/SURG PRIVATE ROOM

## 2020-07-27 PROCEDURE — 82728 ASSAY OF FERRITIN: CPT

## 2020-07-27 PROCEDURE — 99232 PR SUBSEQUENT HOSPITAL CARE,LEVL II: ICD-10-PCS | Mod: 25,,, | Performed by: OBSTETRICS & GYNECOLOGY

## 2020-07-27 PROCEDURE — 76815 PR  US,PREGNANT UTERUS,LIMITED, 1/> FETUSES: ICD-10-PCS | Mod: 26,,, | Performed by: OBSTETRICS & GYNECOLOGY

## 2020-07-27 PROCEDURE — 36415 COLL VENOUS BLD VENIPUNCTURE: CPT

## 2020-07-27 PROCEDURE — 59025 PR FETAL 2N-STRESS TEST: ICD-10-PCS | Mod: 26,,, | Performed by: OBSTETRICS & GYNECOLOGY

## 2020-07-27 PROCEDURE — 59025 FETAL NON-STRESS TEST: CPT

## 2020-07-27 PROCEDURE — 63600175 PHARM REV CODE 636 W HCPCS: Performed by: STUDENT IN AN ORGANIZED HEALTH CARE EDUCATION/TRAINING PROGRAM

## 2020-07-27 PROCEDURE — 83540 ASSAY OF IRON: CPT

## 2020-07-27 PROCEDURE — 99232 SBSQ HOSP IP/OBS MODERATE 35: CPT | Mod: 25,,, | Performed by: OBSTETRICS & GYNECOLOGY

## 2020-07-27 RX ORDER — FAMOTIDINE 40 MG/5ML
20 POWDER, FOR SUSPENSION ORAL 2 TIMES DAILY
Status: DISCONTINUED | OUTPATIENT
Start: 2020-07-27 | End: 2020-08-06

## 2020-07-27 RX ADMIN — FAMOTIDINE 20 MG: 40 POWDER, FOR SUSPENSION ORAL at 09:07

## 2020-07-27 RX ADMIN — AMOXICILLIN 500 MG: 400 POWDER, FOR SUSPENSION ORAL at 02:07

## 2020-07-27 RX ADMIN — MINERAL SUPPLEMENT IRON 300 MG / 5 ML STRENGTH LIQUID 100 PER BOX UNFLAVORED 300 MG: at 09:07

## 2020-07-27 RX ADMIN — FAMOTIDINE 20 MG: 40 POWDER, FOR SUSPENSION ORAL at 08:07

## 2020-07-27 RX ADMIN — AMOXICILLIN 500 MG: 400 POWDER, FOR SUSPENSION ORAL at 10:07

## 2020-07-27 RX ADMIN — AMOXICILLIN 500 MG: 400 POWDER, FOR SUSPENSION ORAL at 06:07

## 2020-07-27 RX ADMIN — AZITHROMYCIN 1000 MG: 200 POWDER, FOR SUSPENSION ORAL at 09:07

## 2020-07-27 NOTE — ASSESSMENT & PLAN NOTE
- Consents for Delivery and Blood signed  - BMZ course complete -  - GBS neg  - Monitor fetal status closely - fetal position confirmed as cephalic/breech.   - FHT reactive and reassuring, uterine irritability but acontractile  - Last US on : EFW was A: 1340 (8th percent), B 1427 g (11%)  - As EFW < 1500, patient would be for  at 34w  - Diet Regular, NST BID

## 2020-07-27 NOTE — PLAN OF CARE
07/27/20 1508   Discharge Assessment   Assessment Type Discharge Planning Assessment   Confirmed/corrected address and phone number on facesheet? Yes   Assessment information obtained from? Patient   Prior to hospitilization cognitive status: Alert/Oriented   Prior to hospitalization functional status: Independent   Current cognitive status: Alert/Oriented   Current Functional Status: Independent   Lives With spouse;sibling(s)   Able to Return to Prior Arrangements yes   Is patient able to care for self after discharge? Yes   Equipment Currently Used at Home none   Do you have any problems affording any of your prescribed medications? No   Does the patient have transportation home? Yes   Transportation Anticipated family or friend will provide   Discharge Plan A Home     Introduced self to pt and explained sw role. No anticipated d/c needs at this time. NICU handbook and unit information provided. Should any d/c needs arise, please consult social work. Emotional support provided.     Shy Lomas LCSW    [facility]  [email or web address]    (phone) [phone] or  [address]. [phone]  (fax) [phone]

## 2020-07-27 NOTE — ASSESSMENT & PLAN NOTE
-7/16 presented to the [facility] with concern for gush of fluid, negative rupture exam, bedside ultrasound demonstrated two pockets of fluid across the visible membrane in both A and B MVP > 2  -7/23 presented to MFM appointment complaining of continued trickling since 7/16 clear fluid  -7/23 MFM ultrasound demonstrated multiple sites of sac separation in this US, concerning for CMS (chorioamniotic membrane separation). Given patient's self described concerns about PPROM (see note from OB ED on 07/16), this fits known data concerning PPROM of one or both sacs confined by membranes  -7/23: In [facility], rupture exam negative, bedside ultrasound revealed same pocket of fluid between the two amniotic membranes  -discussed with MFM, decision to admit to antepartum  PPROM at 31w4d   - now on D#5 of PPROM antibiotics   - continue to monitor closely for signs of infection

## 2020-07-27 NOTE — SUBJECTIVE & OBJECTIVE
Obstetric HPI:  Patient denies contractions, denies vaginal bleeding, reports minimal continuous LOF.  Fetal Movement: normal.         Objective:     Vital Signs (Most Recent):  Temp: 98.2 °F (36.8 °C) (07/27/20 0440)  Pulse: 105 (07/27/20 0441)  Resp: 16 (07/27/20 0440)  BP: (!) 101/55 (07/27/20 0440)  SpO2: 99 % (07/27/20 0002) Vital Signs (24h Range):  Temp:  [98.1 °F (36.7 °C)-98.6 °F (37 °C)] 98.2 °F (36.8 °C)  Pulse:  [] 105  Resp:  [16-18] 16  SpO2:  [98 %-100 %] 99 %  BP: ()/(49-55) 101/55     Weight: 50.8 kg (112 lb)  Body mass index is 21.87 kg/m².    FHT: from 7/26 40 minute daytime monitor   A: 130 moderate variability, + accels, - decels   B: 135 moderate variability, + accels, - decels    Both babies reactive & reassuring  TOCO: irritability, but no contractions       Cervical Exam: checked at 2/60/-3 7/23 at 2050  Dilation:  2  Effacement:  50%  Station: -3   Presentation: Vertex/Transverse per bedside ultrasound in the [facility] 7/23     Significant Labs:  Recent Lab Results       07/23/20  1528   07/23/20  1406   07/23/20  1250   07/23/20  1239        Albumin 2.6           Alkaline Phosphatase 139           ALT 5           Anion Gap 10           Aniso     Slight       AST 12           Basophil%     0.0       BILIRUBIN TOTAL 0.4  Comment:  For infants and newborns, interpretation of results should be based  on gestational age, weight and in agreement with clinical  observations.  Premature Infant recommended reference ranges:  Up to 24 hours.............<8.0 mg/dL  Up to 48 hours............<12.0 mg/dL  3-5 days..................<15.0 mg/dL  6-29 days.................<15.0 mg/dL             BUN, Bld 4           Calcium 8.4           Chloride 107           CO2 21           Creatinine 0.5           Differential Method     Manual       eGFR if  >60           eGFR if non  >60  Comment:  Calculation used to obtain the estimated glomerular filtration  rate (eGFR) is  the CKD-EPI equation.              Eosinophil%     0.0       Fetal Fibronectin       Positive     Glucose 95           Gran%     85.0       Group & Rh     B POS       Hematocrit     26.7       Hemoglobin     8.3       Hypo     Occasional       Immature Granulocytes     CANCELED  Comment:  Result canceled by the ancillary.       INDIRECT DHAVAL     NEG       Lymph%     9.0       MCH     21.7       MCHC     31.1       MCV     70       Metamyelocytes     2.0       Mono%     4.0       MPV     10.4       nRBC     0       Platelet Estimate     Appears normal       Platelets     212       Potassium 3.8           PROTEIN TOTAL 6.8           RBC     3.82       RDW     16.1       Sodium 138           WBC     10.25             Physical Exam:   Constitutional: She is oriented to person, place, and time. She appears well-developed and well-nourished. No distress.    HENT:   Head: Normocephalic and atraumatic.    Eyes: Pupils are equal, round, and reactive to light. EOM are normal.    Neck: Normal range of motion.    Cardiovascular: Normal rate and regular rhythm.     Pulmonary/Chest: Effort normal.   Normal work of breathing          Abdominal: Soft. She exhibits no distension. There is no abdominal tenderness. There is no rebound and no guarding.   Gravid uterus, appropriate for gestational age/twin pregnancy             Musculoskeletal: Normal range of motion. No tenderness or edema.       Neurological: She is alert and oriented to person, place, and time.    Skin: Skin is warm and dry.

## 2020-07-27 NOTE — PROGRESS NOTES
[facility]  Obstetrics  Antepartum Progress Note    Patient Name: Fany May  MRN: [id number]  Admission Date: 2020  Hospital Length of Stay: 4 days  Attending Physician: Marcos Hudson MD  Primary Care Provider: Primary Doctor No    Subjective:     Principal Problem: premature rupture of membranes (PPROM) with unknown onset of labor    HPI:  Fany May is a 25 y.o. [id number] at 31w4d with di-di twin pregnancy who presents from [facility] clinic for concern for rupture of membranes. Patient endorses loss of fluid since , when she presented to the [facility] and had a negative rupture exam, bedside ultrasound demonstrated membrane with fluid pockets on both sides. She also endorses contractions that have increased in frequency and intensity. Denies vaginal bleeding,   Fetal Movement: normal.     MFM: BPP of 8/8 for A and B. Multiple sites of sac separation between twin A and twin B were found on MFM ultrasound concerning for chorioamniotic membrane separation and PPROM.     With MFM ultrasound and given patient's self described concerns about PPROM, this fits known data   concerning pPROM of one or both sacs confined by membranes.     Pregnancy also complicated by FGR 8th percentile A , B 11%.        Hospital Course:  2020 Overnight patient endorsed increased contractions with pain increasing from 3 to 5. Patient cervix was re-checked and found to be unchanged. Since then, the patient's contractions have spaced out to q10-20 minutes. Patient is still feeling them and rating them at 5/10 pain. Continues to endorse trickling of fluid. Denies vaginal bleeding. Endorses fetal movement.  2020 Called to patient's room once overnight. Placed on FHT for 1 hour, no contractions noted on monitor. Called out again for a different constant abdominal pain. SSE performed, cervix visually unchanged 1-2 cm, white discharge noted, but no vaginal pooling or fluid noted. Pain relieved by  tylenol. Continues to endorse trickling of fluid. Denies vaginal bleeding. Endorses fetal movement.  07/26/2020 NSTs reassuring yesterday; patient denies any pain and did not experience any pain yesterday; endorses fetal movement; no bleeding; no contractions  07/27/2020 NST reassuring yesterday. Pt reports continued minimal leakage of clear fluid, worse when standing or with fetal movement. She complains of burning epigastric pain, worse when laying down. No vaginal bleeding, no contractions.    Obstetric HPI:  Patient denies contractions, denies vaginal bleeding, reports minimal continuous LOF.  Fetal Movement: normal.         Objective:     Vital Signs (Most Recent):  Temp: 98.2 °F (36.8 °C) (07/27/20 0440)  Pulse: 105 (07/27/20 0441)  Resp: 16 (07/27/20 0440)  BP: (!) 101/55 (07/27/20 0440)  SpO2: 99 % (07/27/20 0002) Vital Signs (24h Range):  Temp:  [98.1 °F (36.7 °C)-98.6 °F (37 °C)] 98.2 °F (36.8 °C)  Pulse:  [] 105  Resp:  [16-18] 16  SpO2:  [98 %-100 %] 99 %  BP: ()/(49-55) 101/55     Weight: 50.8 kg (112 lb)  Body mass index is 21.87 kg/m².    FHT: from 7/26 40 minute daytime monitor   A: 130 moderate variability, + accels, - decels   B: 135 moderate variability, + accels, - decels    Both babies reactive & reassuring  TOCO: irritability, but no contractions       Cervical Exam: checked at 2/60/-3 7/23 at 2050  Dilation:  2  Effacement:  50%  Station: -3   Presentation: Vertex/Transverse per bedside ultrasound in the [facility] 7/23     Significant Labs:  Recent Lab Results       07/23/20  1528   07/23/20  1406   07/23/20  1250   07/23/20  1239        Albumin 2.6           Alkaline Phosphatase 139           ALT 5           Anion Gap 10           Aniso     Slight       AST 12           Basophil%     0.0       BILIRUBIN TOTAL 0.4  Comment:  For infants and newborns, interpretation of results should be based  on gestational age, weight and in agreement with clinical  observations.  Premature Infant  recommended reference ranges:  Up to 24 hours.............<8.0 mg/dL  Up to 48 hours............<12.0 mg/dL  3-5 days..................<15.0 mg/dL  6-29 days.................<15.0 mg/dL             BUN, Bld 4           Calcium 8.4           Chloride 107           CO2 21           Creatinine 0.5           Differential Method     Manual       eGFR if  >60           eGFR if non  >60  Comment:  Calculation used to obtain the estimated glomerular filtration  rate (eGFR) is the CKD-EPI equation.              Eosinophil%     0.0       Fetal Fibronectin       Positive     Glucose 95           Gran%     85.0       Group & Rh     B POS       Hematocrit     26.7       Hemoglobin     8.3       Hypo     Occasional       Immature Granulocytes     CANCELED  Comment:  Result canceled by the ancillary.       INDIRECT DHAVAL     NEG       Lymph%     9.0       MCH     21.7       MCHC     31.1       MCV     70       Metamyelocytes     2.0       Mono%     4.0       MPV     10.4       nRBC     0       Platelet Estimate     Appears normal       Platelets     212       Potassium 3.8           PROTEIN TOTAL 6.8           RBC     3.82       RDW     16.1       Sodium 138           WBC     10.25             Physical Exam:   Constitutional: She is oriented to person, place, and time. She appears well-developed and well-nourished. No distress.    HENT:   Head: Normocephalic and atraumatic.    Eyes: Pupils are equal, round, and reactive to light. EOM are normal.    Neck: Normal range of motion.    Cardiovascular: Normal rate and regular rhythm.     Pulmonary/Chest: Effort normal.   Normal work of breathing          Abdominal: Soft. She exhibits no distension. There is no abdominal tenderness. There is no rebound and no guarding.   Gravid uterus, appropriate for gestational age/twin pregnancy             Musculoskeletal: Normal range of motion. No tenderness or edema.       Neurological: She is alert and oriented to  person, place, and time.    Skin: Skin is warm and dry.        Assessment/Plan:     25 y.o. female  at 32w1d for:    *  premature rupture of membranes (PPROM) with unknown onset of labor  - presented to the [facility] with concern for gush of fluid, negative rupture exam, bedside ultrasound demonstrated two pockets of fluid across the visible membrane in both A and B MVP > 2  - presented to MFM appointment complaining of continued trickling since  clear fluid  - MFM ultrasound demonstrated multiple sites of sac separation in this US, concerning for CMS (chorioamniotic membrane separation). Given patient's self described concerns about PPROM (see note from OB ED on ), this fits known data concerning PPROM of one or both sacs confined by membranes  -: In [facility], rupture exam negative, bedside ultrasound revealed same pocket of fluid between the two amniotic membranes  -discussed with MFM, decision to admit to antepartum  PPROM at 31w4d   - now on D#5 of PPROM antibiotics   - continue to monitor closely for signs of infection     31 weeks gestation of pregnancy  - Consents for Delivery and Blood signed  - BMZ course complete -  - GBS neg  - Monitor fetal status closely - fetal position confirmed as cephalic/breech.   - FHT reactive and reassuring, uterine irritability but acontractile  - Last US on : EFW was A: 1340 (8th percent), B 1427 g (11%)  - As EFW < 1500, patient would be for  at 34w  - Diet Regular, NST BID    Iron deficiency anemia  - H/H on admit .  - daily iron and scheduled colace    Desires immediate post-placental Mirena  - Medicaid  -will order if patient proceeds with delivery    Sickle cell trait  - H/H on admit ..7                Naima Aponte MD  OBGYN PGY-2

## 2020-07-28 LAB
GLUCOSE SERPL-MCNC: 87 MG/DL (ref 70–110)
POCT GLUCOSE: 75 MG/DL (ref 70–110)
POCT GLUCOSE: 94 MG/DL (ref 70–110)
POCT GLUCOSE: 98 MG/DL (ref 70–110)

## 2020-07-28 PROCEDURE — 11000001 HC ACUTE MED/SURG PRIVATE ROOM

## 2020-07-28 PROCEDURE — 59025 PR FETAL 2N-STRESS TEST: ICD-10-PCS | Mod: 26,,, | Performed by: OBSTETRICS & GYNECOLOGY

## 2020-07-28 PROCEDURE — 25000003 PHARM REV CODE 250: Performed by: STUDENT IN AN ORGANIZED HEALTH CARE EDUCATION/TRAINING PROGRAM

## 2020-07-28 PROCEDURE — 59025 FETAL NON-STRESS TEST: CPT | Mod: 26,,, | Performed by: OBSTETRICS & GYNECOLOGY

## 2020-07-28 PROCEDURE — 99231 PR SUBSEQUENT HOSPITAL CARE,LEVL I: ICD-10-PCS | Mod: 25,,, | Performed by: OBSTETRICS & GYNECOLOGY

## 2020-07-28 PROCEDURE — 36415 COLL VENOUS BLD VENIPUNCTURE: CPT

## 2020-07-28 PROCEDURE — 82947 ASSAY GLUCOSE BLOOD QUANT: CPT

## 2020-07-28 PROCEDURE — 99231 SBSQ HOSP IP/OBS SF/LOW 25: CPT | Mod: 25,,, | Performed by: OBSTETRICS & GYNECOLOGY

## 2020-07-28 RX ORDER — FERROUS SULFATE 300 MG/5ML
300 LIQUID (ML) ORAL 2 TIMES DAILY
Status: DISCONTINUED | OUTPATIENT
Start: 2020-07-28 | End: 2020-08-12 | Stop reason: HOSPADM

## 2020-07-28 RX ADMIN — AMOXICILLIN 500 MG: 400 POWDER, FOR SUSPENSION ORAL at 06:07

## 2020-07-28 RX ADMIN — AMOXICILLIN 500 MG: 400 POWDER, FOR SUSPENSION ORAL at 10:07

## 2020-07-28 RX ADMIN — MINERAL SUPPLEMENT IRON 300 MG / 5 ML STRENGTH LIQUID 100 PER BOX UNFLAVORED 300 MG: at 08:07

## 2020-07-28 RX ADMIN — DOCUSATE SODIUM 200 MG: 50 LIQUID ORAL at 08:07

## 2020-07-28 RX ADMIN — AMOXICILLIN 500 MG: 400 POWDER, FOR SUSPENSION ORAL at 02:07

## 2020-07-28 RX ADMIN — FAMOTIDINE 20 MG: 40 POWDER, FOR SUSPENSION ORAL at 08:07

## 2020-07-28 NOTE — PLAN OF CARE
Pt reports no acute changes or complaints throughout night. VS stable and afebrile. Pt reports + FM, denies vaginal bleeding, leakage of fluid, or contractions. Pt denies headache, blurry vision, or RUQ pain. BG monitored after meals. Fasting glucose to be obtained this morning. Amoxicillin continued Q8hrs for infection control.  Plan of care reviewed with patient. All questions and concerns addressed at this time.

## 2020-07-28 NOTE — PROGRESS NOTES
[facility]  Obstetrics  Antepartum Progress Note    Patient Name: Fany May  MRN: [id number]  Admission Date: 2020  Hospital Length of Stay: 5 days  Attending Physician: Marcos Hudson MD  Primary Care Provider: Primary Doctor No    Subjective:     Principal Problem: premature rupture of membranes (PPROM) with unknown onset of labor    HPI:  Fany May is a 25 y.o. [id number] at 31w4d with di-di twin pregnancy who presents from [facility] clinic for concern for rupture of membranes. Patient endorses loss of fluid since , when she presented to the [facility] and had a negative rupture exam, bedside ultrasound demonstrated membrane with fluid pockets on both sides. She also endorses contractions that have increased in frequency and intensity. Denies vaginal bleeding,   Fetal Movement: normal.     MFM: BPP of 8/8 for A and B. Multiple sites of sac separation between twin A and twin B were found on MFM ultrasound concerning for chorioamniotic membrane separation and PPROM.     With MFM ultrasound and given patient's self described concerns about PPROM, this fits known data   concerning pPROM of one or both sacs confined by membranes.     Pregnancy also complicated by FGR 8th percentile A , B 11%.        Hospital Course:  2020 Overnight patient endorsed increased contractions with pain increasing from 3 to 5. Patient cervix was re-checked and found to be unchanged. Since then, the patient's contractions have spaced out to q10-20 minutes. Patient is still feeling them and rating them at 5/10 pain. Continues to endorse trickling of fluid. Denies vaginal bleeding. Endorses fetal movement.  2020 Called to patient's room once overnight. Placed on FHT for 1 hour, no contractions noted on monitor. Called out again for a different constant abdominal pain. SSE performed, cervix visually unchanged 1-2 cm, white discharge noted, but no vaginal pooling or fluid noted. Pain relieved by  "tylenol. Continues to endorse trickling of fluid. Denies vaginal bleeding. Endorses fetal movement.  07/26/2020 NSTs reassuring yesterday; patient denies any pain and did not experience any pain yesterday; endorses fetal movement; no bleeding; no contractions  07/27/2020 NST reassuring yesterday. Pt reports continued minimal leakage of clear fluid, worse when standing or with fetal movement. She complains of burning epigastric pain, worse when laying down. No vaginal bleeding, no contractions.  07/28/2020 NST reassuring yesterday. Patient reports continued minimal leakage of clear fluid, worse when standing & ambulating. Epigastric pain has improved with PPI. Feeling abdominal "tightening" 2 times per day, no regular contractions, no vaginal bleeding, endorses regular fetal movement. Pt complains of occasional mild HA, improves spontaneously. Denies visual changes, CP, SOB, RUQ pain.    Obstetric HPI:  Patient denies regular contractions, denies vaginal bleeding, reports minimal continuous LOF.  Fetal Movement: normal.         Objective:     Vital Signs (Most Recent):  Temp: 98.2 °F (36.8 °C) (07/28/20 0315)  Pulse: 88 (07/28/20 0315)  Resp: 16 (07/28/20 0315)  BP: (!) 106/51 (07/28/20 0315)  SpO2: 97 % (07/28/20 0315) Vital Signs (24h Range):  Temp:  [98.2 °F (36.8 °C)-99.1 °F (37.3 °C)] 98.2 °F (36.8 °C)  Pulse:  [] 88  Resp:  [16] 16  SpO2:  [96 %-100 %] 97 %  BP: ()/(50-57) 106/51     Weight: 50.8 kg (112 lb)  Body mass index is 21.87 kg/m².    FHT: from 7/27 20 minute evening monitor   A: 135 moderate variability, + accels, - decels   B: 140 moderate variability, + accels, - decels    Both babies reactive & reassuring  TOCO: acontractile      Cervical Exam: checked at 2/60/-3 7/23 at 2050  Dilation:  2  Effacement:  50%  Station: -3   Presentation: Vertex/Transverse per bedside ultrasound in the [facility] 7/23     Significant Labs:  Recent Lab Results       07/23/20  1528   07/23/20  1406   " 07/23/20  1250   07/23/20  1239        Albumin 2.6           Alkaline Phosphatase 139           ALT 5           Anion Gap 10           Aniso     Slight       AST 12           Basophil%     0.0       BILIRUBIN TOTAL 0.4  Comment:  For infants and newborns, interpretation of results should be based  on gestational age, weight and in agreement with clinical  observations.  Premature Infant recommended reference ranges:  Up to 24 hours.............<8.0 mg/dL  Up to 48 hours............<12.0 mg/dL  3-5 days..................<15.0 mg/dL  6-29 days.................<15.0 mg/dL             BUN, Bld 4           Calcium 8.4           Chloride 107           CO2 21           Creatinine 0.5           Differential Method     Manual       eGFR if  >60           eGFR if non  >60  Comment:  Calculation used to obtain the estimated glomerular filtration  rate (eGFR) is the CKD-EPI equation.              Eosinophil%     0.0       Fetal Fibronectin       Positive     Glucose 95           Gran%     85.0       Group & Rh     B POS       Hematocrit     26.7       Hemoglobin     8.3       Hypo     Occasional       Immature Granulocytes     CANCELED  Comment:  Result canceled by the ancillary.       INDIRECT DHAVAL     NEG       Lymph%     9.0       MCH     21.7       MCHC     31.1       MCV     70       Metamyelocytes     2.0       Mono%     4.0       MPV     10.4       nRBC     0       Platelet Estimate     Appears normal       Platelets     212       Potassium 3.8           PROTEIN TOTAL 6.8           RBC     3.82       RDW     16.1       Sodium 138           WBC     10.25             Physical Exam:   Constitutional: She is oriented to person, place, and time. She appears well-developed and well-nourished. No distress.    HENT:   Head: Normocephalic and atraumatic.    Eyes: Pupils are equal, round, and reactive to light. EOM are normal.    Neck: Normal range of motion.    Cardiovascular: Normal rate and  regular rhythm.     Pulmonary/Chest: Effort normal.   Normal work of breathing          Abdominal: Soft. She exhibits no distension. There is no abdominal tenderness. There is no rebound and no guarding.   Gravid uterus, appropriate for gestational age/twin pregnancy             Musculoskeletal: Normal range of motion. No tenderness or edema.       Neurological: She is alert and oriented to person, place, and time.    Skin: Skin is warm and dry.        Assessment/Plan:     25 y.o. female  at 32w2d for:    *  premature rupture of membranes (PPROM) with unknown onset of labor  -: presented to the [facility] with concern for gush of fluid, negative rupture exam, bedside ultrasound demonstrated two pockets of fluid across the visible membrane in both A and B MVP > 2  -: presented to MFM appointment complaining of continued trickling since  clear fluid  -: MFM ultrasound demonstrated multiple sites of sac separation in this US, concerning for CMS (chorioamniotic membrane separation). Given patient's self described concerns about PPROM (see note from OB ED on ), this fits known data concerning PPROM of one or both sacs confined by membranes  -: In [facility], rupture exam negative, bedside ultrasound revealed same pocket of fluid between the two amniotic membranes  -discussed with MFM, decision to admit to antepartum  - : MVP normal on limited ultrasound  PPROM at 31w4d    - now on D#6 of PPROM antibiotics    - continue to monitor closely for signs of infection     31 weeks gestation of pregnancy  - Consents for Delivery and Blood signed  - BMZ course complete -  - GBS neg  - Monitor fetal status closely    - fetal position confirmed as cephalic/breech   - normal MVP on limited ultrasound   - FHT reactive and reassuring, TOCO acontractile  - Last US on : EFW was A: 1340 (8th percent), B 1427 g (11%)  - As EFW now likely > 1500 and given GA, DARRELL may be considered if OB  provider in house is comfortable with breech extraction, risks discussed with patient, if not, would be for  at 34w  - Diet Regular, NST BID      Iron deficiency anemia  - H/H on admit   - Iron studies:   - Iron 19   - TIBC 670   - Saturated iron 3   - Transferrin 453   - Ferritin 8  - daily iron and scheduled colace    Desires immediate post-placental Mirena  - Medicaid  -will order if patient proceeds with delivery    Sickle cell trait  - H/H on admit                 Naima Aponte MD  OBGYN PGY-2

## 2020-07-28 NOTE — PLAN OF CARE
Vital signs stable, afebrile, Twin NSTs completed and reviewed per MD; no pain/cramping/contractions

## 2020-07-28 NOTE — ASSESSMENT & PLAN NOTE
- H/H on admit 8.2/26.7  - Iron studies:   - Iron 19   - TIBC 670   - Saturated iron 3   - Transferrin 453   - Ferritin 8  - daily iron and scheduled colace

## 2020-07-28 NOTE — ASSESSMENT & PLAN NOTE
- Consents for Delivery and Blood signed  - BMZ course complete -  - GBS neg  - Monitor fetal status closely    - fetal position confirmed as cephalic/breech   - normal MVP on limited ultrasound   - FHT reactive and reassuring, TOCO acontractile  - Last US on : EFW was A: 1340 (8th percent), B 1427 g (11%)  - As EFW now likely > 1500 and given GA, DARRELL may be considered if OB provider in house is comfortable with breech extraction, risks discussed with patient, if not, would be for  at 34w  - Diet Regular, NST BID

## 2020-07-28 NOTE — SUBJECTIVE & OBJECTIVE
Obstetric HPI:  Patient denies regular contractions, denies vaginal bleeding, reports minimal continuous LOF.  Fetal Movement: normal.         Objective:     Vital Signs (Most Recent):  Temp: 98.2 °F (36.8 °C) (07/28/20 0315)  Pulse: 88 (07/28/20 0315)  Resp: 16 (07/28/20 0315)  BP: (!) 106/51 (07/28/20 0315)  SpO2: 97 % (07/28/20 0315) Vital Signs (24h Range):  Temp:  [98.2 °F (36.8 °C)-99.1 °F (37.3 °C)] 98.2 °F (36.8 °C)  Pulse:  [] 88  Resp:  [16] 16  SpO2:  [96 %-100 %] 97 %  BP: ()/(50-57) 106/51     Weight: 50.8 kg (112 lb)  Body mass index is 21.87 kg/m².    FHT: from 7/27 20 minute evening monitor   A: 135 moderate variability, + accels, - decels   B: 140 moderate variability, + accels, - decels    Both babies reactive & reassuring  TOCO: acontractile      Cervical Exam: checked at 2/60/-3 7/23 at 2050  Dilation:  2  Effacement:  50%  Station: -3   Presentation: Vertex/Transverse per bedside ultrasound in the [facility] 7/23     Significant Labs:  Recent Lab Results       07/23/20  1528   07/23/20  1406   07/23/20  1250   07/23/20  1239        Albumin 2.6           Alkaline Phosphatase 139           ALT 5           Anion Gap 10           Aniso     Slight       AST 12           Basophil%     0.0       BILIRUBIN TOTAL 0.4  Comment:  For infants and newborns, interpretation of results should be based  on gestational age, weight and in agreement with clinical  observations.  Premature Infant recommended reference ranges:  Up to 24 hours.............<8.0 mg/dL  Up to 48 hours............<12.0 mg/dL  3-5 days..................<15.0 mg/dL  6-29 days.................<15.0 mg/dL             BUN, Bld 4           Calcium 8.4           Chloride 107           CO2 21           Creatinine 0.5           Differential Method     Manual       eGFR if  >60           eGFR if non  >60  Comment:  Calculation used to obtain the estimated glomerular filtration  rate (eGFR) is the CKD-EPI  equation.              Eosinophil%     0.0       Fetal Fibronectin       Positive     Glucose 95           Gran%     85.0       Group & Rh     B POS       Hematocrit     26.7       Hemoglobin     8.3       Hypo     Occasional       Immature Granulocytes     CANCELED  Comment:  Result canceled by the ancillary.       INDIRECT DHAVAL     NEG       Lymph%     9.0       MCH     21.7       MCHC     31.1       MCV     70       Metamyelocytes     2.0       Mono%     4.0       MPV     10.4       nRBC     0       Platelet Estimate     Appears normal       Platelets     212       Potassium 3.8           PROTEIN TOTAL 6.8           RBC     3.82       RDW     16.1       Sodium 138           WBC     10.25             Physical Exam:   Constitutional: She is oriented to person, place, and time. She appears well-developed and well-nourished. No distress.    HENT:   Head: Normocephalic and atraumatic.    Eyes: Pupils are equal, round, and reactive to light. EOM are normal.    Neck: Normal range of motion.    Cardiovascular: Normal rate and regular rhythm.     Pulmonary/Chest: Effort normal.   Normal work of breathing          Abdominal: Soft. She exhibits no distension. There is no abdominal tenderness. There is no rebound and no guarding.   Gravid uterus, appropriate for gestational age/twin pregnancy             Musculoskeletal: Normal range of motion. No tenderness or edema.       Neurological: She is alert and oriented to person, place, and time.    Skin: Skin is warm and dry.

## 2020-07-28 NOTE — ASSESSMENT & PLAN NOTE
-7/16: presented to the [facility] with concern for gush of fluid, negative rupture exam, bedside ultrasound demonstrated two pockets of fluid across the visible membrane in both A and B MVP > 2  -7/23: presented to MFM appointment complaining of continued trickling since 7/16 clear fluid  -7/23: MFM ultrasound demonstrated multiple sites of sac separation in this US, concerning for CMS (chorioamniotic membrane separation). Given patient's self described concerns about PPROM (see note from OB ED on 07/16), this fits known data concerning PPROM of one or both sacs confined by membranes  -7/23: In [facility], rupture exam negative, bedside ultrasound revealed same pocket of fluid between the two amniotic membranes  -discussed with MFM, decision to admit to antepartum  - 7/27: MVP normal on limited ultrasound  PPROM at 31w4d    - now on D#6 of PPROM antibiotics    - continue to monitor closely for signs of infection

## 2020-07-29 LAB
ABO + RH BLD: NORMAL
BLD GP AB SCN CELLS X3 SERPL QL: NORMAL
GLUCOSE SERPL-MCNC: 102 MG/DL (ref 70–110)
POCT GLUCOSE: 107 MG/DL (ref 70–110)
POCT GLUCOSE: 130 MG/DL (ref 70–110)
POCT GLUCOSE: 148 MG/DL (ref 70–110)
POCT GLUCOSE: 90 MG/DL (ref 70–110)

## 2020-07-29 PROCEDURE — 25000003 PHARM REV CODE 250: Performed by: STUDENT IN AN ORGANIZED HEALTH CARE EDUCATION/TRAINING PROGRAM

## 2020-07-29 PROCEDURE — 99231 PR SUBSEQUENT HOSPITAL CARE,LEVL I: ICD-10-PCS | Mod: 25,,, | Performed by: OBSTETRICS & GYNECOLOGY

## 2020-07-29 PROCEDURE — 11000001 HC ACUTE MED/SURG PRIVATE ROOM

## 2020-07-29 PROCEDURE — 86850 RBC ANTIBODY SCREEN: CPT

## 2020-07-29 PROCEDURE — 36415 COLL VENOUS BLD VENIPUNCTURE: CPT

## 2020-07-29 PROCEDURE — 59025 FETAL NON-STRESS TEST: CPT | Mod: 26,,, | Performed by: OBSTETRICS & GYNECOLOGY

## 2020-07-29 PROCEDURE — 86920 COMPATIBILITY TEST SPIN: CPT

## 2020-07-29 PROCEDURE — 82947 ASSAY GLUCOSE BLOOD QUANT: CPT

## 2020-07-29 PROCEDURE — 59025 PR FETAL 2N-STRESS TEST: ICD-10-PCS | Mod: 26,59,, | Performed by: OBSTETRICS & GYNECOLOGY

## 2020-07-29 PROCEDURE — 99231 SBSQ HOSP IP/OBS SF/LOW 25: CPT | Mod: 25,,, | Performed by: OBSTETRICS & GYNECOLOGY

## 2020-07-29 RX ADMIN — MINERAL SUPPLEMENT IRON 300 MG / 5 ML STRENGTH LIQUID 100 PER BOX UNFLAVORED 300 MG: at 10:07

## 2020-07-29 RX ADMIN — AMOXICILLIN 500 MG: 400 POWDER, FOR SUSPENSION ORAL at 06:07

## 2020-07-29 RX ADMIN — AMOXICILLIN 500 MG: 400 POWDER, FOR SUSPENSION ORAL at 05:07

## 2020-07-29 NOTE — SUBJECTIVE & OBJECTIVE
"Obstetric HPI:  Intermittent abdominal non-painful "cramping", denies vaginal bleeding, reports decreasing continuous LOF.  Fetal Movement: normal.         Objective:     Vital Signs (Most Recent):  Temp: 98.2 °F (36.8 °C) (07/29/20 0541)  Pulse: 89 (07/29/20 0541)  Resp: 17 (07/29/20 0541)  BP: (!) 99/57 (07/29/20 0541)  SpO2: 98 % (07/29/20 0541) Vital Signs (24h Range):  Temp:  [98.2 °F (36.8 °C)-98.8 °F (37.1 °C)] 98.2 °F (36.8 °C)  Pulse:  [] 89  Resp:  [17-18] 17  SpO2:  [98 %] 98 %  BP: ()/(54-57) 99/57     Weight: 50.8 kg (112 lb)  Body mass index is 21.87 kg/m².    FHT: from 7/28 40 minute evening monitor   A: 140 moderate variability, + accels, 1 variable decels noted   B: 145 moderate variability, + accels, - decels    Both babies reactive & reassuring  TOCO: baseline uterine irritability with irregular ctx q1-8min      Cervical Exam: checked at 2/60/-3 7/23 at 2050  Dilation:  2  Effacement:  50%  Station: -3   Presentation: Vertex/Transverse per bedside ultrasound in the [facility] 7/23  Spec Exam 2/60/-3, unchanged, 7/28 at 2000     Significant Labs:  Recent Lab Results       07/23/20  1528   07/23/20  1406   07/23/20  1250   07/23/20  1239        Albumin 2.6           Alkaline Phosphatase 139           ALT 5           Anion Gap 10           Aniso     Slight       AST 12           Basophil%     0.0       BILIRUBIN TOTAL 0.4  Comment:  For infants and newborns, interpretation of results should be based  on gestational age, weight and in agreement with clinical  observations.  Premature Infant recommended reference ranges:  Up to 24 hours.............<8.0 mg/dL  Up to 48 hours............<12.0 mg/dL  3-5 days..................<15.0 mg/dL  6-29 days.................<15.0 mg/dL             BUN, Bld 4           Calcium 8.4           Chloride 107           CO2 21           Creatinine 0.5           Differential Method     Manual       eGFR if  >60           eGFR if non  " >60  Comment:  Calculation used to obtain the estimated glomerular filtration  rate (eGFR) is the CKD-EPI equation.              Eosinophil%     0.0       Fetal Fibronectin       Positive     Glucose 95           Gran%     85.0       Group & Rh     B POS       Hematocrit     26.7       Hemoglobin     8.3       Hypo     Occasional       Immature Granulocytes     CANCELED  Comment:  Result canceled by the ancillary.       INDIRECT DHAVAL     NEG       Lymph%     9.0       MCH     21.7       MCHC     31.1       MCV     70       Metamyelocytes     2.0       Mono%     4.0       MPV     10.4       nRBC     0       Platelet Estimate     Appears normal       Platelets     212       Potassium 3.8           PROTEIN TOTAL 6.8           RBC     3.82       RDW     16.1       Sodium 138           WBC     10.25             Physical Exam:   Constitutional: She is oriented to person, place, and time. She appears well-developed and well-nourished. No distress.    HENT:   Head: Normocephalic and atraumatic.    Eyes: Pupils are equal, round, and reactive to light. EOM are normal.    Neck: Normal range of motion.    Cardiovascular: Normal rate and regular rhythm.     Pulmonary/Chest: Effort normal.   Normal work of breathing          Abdominal: Soft. She exhibits no distension. There is no abdominal tenderness. There is no rebound and no guarding.   Gravid uterus, appropriate for gestational age/twin pregnancy             Musculoskeletal: Normal range of motion. No tenderness or edema.       Neurological: She is alert and oriented to person, place, and time.    Skin: Skin is warm and dry.

## 2020-07-29 NOTE — ASSESSMENT & PLAN NOTE
- H/H on admit 8.2/26.7  - Iron studies:   - Iron 19   - TIBC 670   - Saturated iron 3   - Transferrin 453   - Ferritin 8  - On iron BID and scheduled colace

## 2020-07-29 NOTE — PROGRESS NOTES
MD to bedside to assess patient. Pt reporting pelvic pressure since earlier today. States she thought it would resolve but it has continued intermittently. She also feels as though she is peeing on herself, and when asked reports it is probably vaginal discharge. Denies vaginal bleeding, fevers, or painful contractions. She feels some tightening now and then.     NST earlier this evening reactive x 2, + irregular ctx and uterine irritability.    SSE: visually appears to be unchanged with thick minimally dilated cervix; 2/60/-3.  General appearance: NAD  Abd nontender.    A/P:  - pt to inform if pressure or contractions are worsening, or if any other changes.  - if so, will restart fetal monitoring and recheck cervix    Sushma K. Reddy, MD  PGY-4, OBGYN

## 2020-07-29 NOTE — PLAN OF CARE
spoke with nurse and doctor and provided a compassionate presence and reflective listening for patient.

## 2020-07-29 NOTE — PROGRESS NOTES
[facility]  Obstetrics  Antepartum Progress Note    Patient Name: Fany May  MRN: [id number]  Admission Date: 2020  Hospital Length of Stay: 6 days  Attending Physician: Marcos Hudson MD  Primary Care Provider: Primary Doctor No    Subjective:     Principal Problem: premature rupture of membranes (PPROM) with unknown onset of labor    HPI:  Fany May is a 25 y.o. [id number] at 31w4d with di-di twin pregnancy who presents from [facility] clinic for concern for rupture of membranes. Patient endorses loss of fluid since , when she presented to the [facility] and had a negative rupture exam, bedside ultrasound demonstrated membrane with fluid pockets on both sides. She also endorses contractions that have increased in frequency and intensity. Denies vaginal bleeding,   Fetal Movement: normal.     MFM: BPP of 8/8 for A and B. Multiple sites of sac separation between twin A and twin B were found on MFM ultrasound concerning for chorioamniotic membrane separation and PPROM.     With MFM ultrasound and given patient's self described concerns about PPROM, this fits known data   concerning pPROM of one or both sacs confined by membranes.     Pregnancy also complicated by FGR 8th percentile A , B 11%.        Hospital Course:  2020 Overnight patient endorsed increased contractions with pain increasing from 3 to 5. Patient cervix was re-checked and found to be unchanged. Since then, the patient's contractions have spaced out to q10-20 minutes. Patient is still feeling them and rating them at 5/10 pain. Continues to endorse trickling of fluid. Denies vaginal bleeding. Endorses fetal movement.  2020 Called to patient's room once overnight. Placed on FHT for 1 hour, no contractions noted on monitor. Called out again for a different constant abdominal pain. SSE performed, cervix visually unchanged 1-2 cm, white discharge noted, but no vaginal pooling or fluid noted. Pain relieved by  "tylenol. Continues to endorse trickling of fluid. Denies vaginal bleeding. Endorses fetal movement.  07/26/2020 NSTs reassuring yesterday; patient denies any pain and did not experience any pain yesterday; endorses fetal movement; no bleeding; no contractions  07/27/2020 NST reassuring yesterday. Pt reports continued minimal leakage of clear fluid, worse when standing or with fetal movement. She complains of burning epigastric pain, worse when laying down. No vaginal bleeding, no contractions.  07/28/2020 NST reassuring yesterday. Patient reports continued minimal leakage of clear fluid, worse when standing & ambulating. Epigastric pain has improved with PPI. Feeling abdominal "tightening" 2 times per day, no regular contractions, no vaginal bleeding, endorses regular fetal movement. Pt complains of occasional mild HA, improves spontaneously. Denies visual changes, CP, SOB, RUQ pain.  07/29/2020 NST reassuring yesterday. Patient feels leakage of fluid has improved. Epigastric pain and HA have resolved. Still feeling abdominal tightening and cramping intermittently, unable to specify frequency, was able to sleep through the night. One episode of increased pelvic pressure at 2000, spec exam was unchanged 2/60/-3. Patient feeling very down about staying in the hospital for an extended period.     Obstetric HPI:  Intermittent abdominal non-painful "cramping", denies vaginal bleeding, reports decreasing continuous LOF.  Fetal Movement: normal.         Objective:     Vital Signs (Most Recent):  Temp: 98.2 °F (36.8 °C) (07/29/20 0541)  Pulse: 89 (07/29/20 0541)  Resp: 17 (07/29/20 0541)  BP: (!) 99/57 (07/29/20 0541)  SpO2: 98 % (07/29/20 0541) Vital Signs (24h Range):  Temp:  [98.2 °F (36.8 °C)-98.8 °F (37.1 °C)] 98.2 °F (36.8 °C)  Pulse:  [] 89  Resp:  [17-18] 17  SpO2:  [98 %] 98 %  BP: ()/(54-57) 99/57     Weight: 50.8 kg (112 lb)  Body mass index is 21.87 kg/m².    FHT: from 7/28 40 minute evening " monitor   A: 140 moderate variability, + accels, 1 variable decels noted   B: 145 moderate variability, + accels, - decels    Both babies reactive & reassuring  TOCO: baseline uterine irritability with irregular ctx q1-8min      Cervical Exam: checked at 2/60/-3 7/23 at 2050  Dilation:  2  Effacement:  50%  Station: -3   Presentation: Vertex/Transverse per bedside ultrasound in the [facility] 7/23  Spec Exam 2/60/-3, unchanged, 7/28 at 2000     Significant Labs:  Recent Lab Results       07/23/20  1528   07/23/20  1406   07/23/20  1250   07/23/20  1239        Albumin 2.6           Alkaline Phosphatase 139           ALT 5           Anion Gap 10           Aniso     Slight       AST 12           Basophil%     0.0       BILIRUBIN TOTAL 0.4  Comment:  For infants and newborns, interpretation of results should be based  on gestational age, weight and in agreement with clinical  observations.  Premature Infant recommended reference ranges:  Up to 24 hours.............<8.0 mg/dL  Up to 48 hours............<12.0 mg/dL  3-5 days..................<15.0 mg/dL  6-29 days.................<15.0 mg/dL             BUN, Bld 4           Calcium 8.4           Chloride 107           CO2 21           Creatinine 0.5           Differential Method     Manual       eGFR if  >60           eGFR if non  >60  Comment:  Calculation used to obtain the estimated glomerular filtration  rate (eGFR) is the CKD-EPI equation.              Eosinophil%     0.0       Fetal Fibronectin       Positive     Glucose 95           Gran%     85.0       Group & Rh     B POS       Hematocrit     26.7       Hemoglobin     8.3       Hypo     Occasional       Immature Granulocytes     CANCELED  Comment:  Result canceled by the ancillary.       INDIRECT DHAVAL     NEG       Lymph%     9.0       MCH     21.7       MCHC     31.1       MCV     70       Metamyelocytes     2.0       Mono%     4.0       MPV     10.4       nRBC     0       Platelet  Estimate     Appears normal       Platelets     212       Potassium 3.8           PROTEIN TOTAL 6.8           RBC     3.82       RDW     16.1       Sodium 138           WBC     10.25             Physical Exam:   Constitutional: She is oriented to person, place, and time. She appears well-developed and well-nourished. No distress.    HENT:   Head: Normocephalic and atraumatic.    Eyes: Pupils are equal, round, and reactive to light. EOM are normal.    Neck: Normal range of motion.    Cardiovascular: Normal rate and regular rhythm.     Pulmonary/Chest: Effort normal.   Normal work of breathing          Abdominal: Soft. She exhibits no distension. There is no abdominal tenderness. There is no rebound and no guarding.   Gravid uterus, appropriate for gestational age/twin pregnancy             Musculoskeletal: Normal range of motion. No tenderness or edema.       Neurological: She is alert and oriented to person, place, and time.    Skin: Skin is warm and dry.        Assessment/Plan:     25 y.o. female  at 32w3d for:    *  premature rupture of membranes (PPROM) with unknown onset of labor  -: presented to the [facility] with concern for gush of fluid, negative rupture exam, bedside ultrasound demonstrated two pockets of fluid across the visible membrane in both A and B MVP > 2  -: presented to M appointment complaining of continued trickling since  clear fluid  -: M ultrasound demonstrated multiple sites of sac separation in this US, concerning for CMS (chorioamniotic membrane separation). Given patient's self described concerns about PPROM (see note from OB ED on ), this fits known data concerning PPROM of one or both sacs confined by membranes  -: In [facility], rupture exam negative, bedside ultrasound revealed same pocket of fluid between the two amniotic membranes  -discussed with MFM, decision to admit to antepartum  - : MVP normal on limited ultrasound  PPROM at 31w4d    - now on  D#7 of PPROM antibiotics    - continue to monitor closely for signs of infection     31 weeks gestation of pregnancy  - Consents for Delivery and Blood signed  - BMZ course complete -  - GBS neg  - Monitor fetal status closely    - fetal position confirmed as cephalic/breech   - normal MVP on limited ultrasound   - FHT reactive and reassuring, TOCO ctx q1-8min with baseline uterine irritability  - Last US on : EFW was A: 1340 (8th percent), B 1427 g (11%)  - As EFW now likely > 1500 and given GA, DARRELL may be considered if OB provider in house is comfortable with breech extraction, risks discussed with patient, if not, would be for  at 34w  - Diet Regular, NST BID      Iron deficiency anemia  - H/H on admit   - Iron studies:   - Iron 19   - TIBC 670   - Saturated iron 3   - Transferrin 453   - Ferritin 8  - On iron BID and scheduled colace    Desires immediate post-placental Mirena  - Medicaid  -will order if patient proceeds with delivery    Sickle cell trait  - H/H on admit                   Naima Aponte MD  OBGYN PGY-2

## 2020-07-29 NOTE — ASSESSMENT & PLAN NOTE
-7/16: presented to the [facility] with concern for gush of fluid, negative rupture exam, bedside ultrasound demonstrated two pockets of fluid across the visible membrane in both A and B MVP > 2  -7/23: presented to MFM appointment complaining of continued trickling since 7/16 clear fluid  -7/23: MFM ultrasound demonstrated multiple sites of sac separation in this US, concerning for CMS (chorioamniotic membrane separation). Given patient's self described concerns about PPROM (see note from OB ED on 07/16), this fits known data concerning PPROM of one or both sacs confined by membranes  -7/23: In [facility], rupture exam negative, bedside ultrasound revealed same pocket of fluid between the two amniotic membranes  -discussed with MFM, decision to admit to antepartum  - 7/27: MVP normal on limited ultrasound  PPROM at 31w4d    - now on D#7 of PPROM antibiotics    - continue to monitor closely for signs of infection

## 2020-07-29 NOTE — ASSESSMENT & PLAN NOTE
- Consents for Delivery and Blood signed  - BMZ course complete -  - GBS neg  - Monitor fetal status closely    - fetal position confirmed as cephalic/breech   - normal MVP on limited ultrasound   - FHT reactive and reassuring, TOCO ctx q1-8min with baseline uterine irritability  - Last US on : EFW was A: 1340 (8th percent), B 1427 g (11%)  - As EFW now likely > 1500 and given GA, DARRELL may be considered if OB provider in house is comfortable with breech extraction, risks discussed with patient, if not, would be for  at 34w  - Diet Regular, NST BID

## 2020-07-30 LAB
GLUCOSE SERPL-MCNC: 95 MG/DL (ref 70–110)
POCT GLUCOSE: 127 MG/DL (ref 70–110)
POCT GLUCOSE: 135 MG/DL (ref 70–110)
POCT GLUCOSE: 89 MG/DL (ref 70–110)
POCT GLUCOSE: 94 MG/DL (ref 70–110)

## 2020-07-30 PROCEDURE — 99231 SBSQ HOSP IP/OBS SF/LOW 25: CPT | Mod: 25,,, | Performed by: OBSTETRICS & GYNECOLOGY

## 2020-07-30 PROCEDURE — 11000001 HC ACUTE MED/SURG PRIVATE ROOM

## 2020-07-30 PROCEDURE — 76820 PR US, OB DOPPLER, FETAL UMBILICAL ARTERY ECHO: ICD-10-PCS | Mod: 26,,, | Performed by: OBSTETRICS & GYNECOLOGY

## 2020-07-30 PROCEDURE — 25000003 PHARM REV CODE 250: Performed by: STUDENT IN AN ORGANIZED HEALTH CARE EDUCATION/TRAINING PROGRAM

## 2020-07-30 PROCEDURE — 76820 UMBILICAL ARTERY ECHO: CPT | Mod: 26,,, | Performed by: OBSTETRICS & GYNECOLOGY

## 2020-07-30 PROCEDURE — 59025 FETAL NON-STRESS TEST: CPT

## 2020-07-30 PROCEDURE — 99231 PR SUBSEQUENT HOSPITAL CARE,LEVL I: ICD-10-PCS | Mod: 25,,, | Performed by: OBSTETRICS & GYNECOLOGY

## 2020-07-30 PROCEDURE — 76820 UMBILICAL ARTERY ECHO: CPT

## 2020-07-30 PROCEDURE — 76819 FETAL BIOPHYS PROFIL W/O NST: CPT

## 2020-07-30 PROCEDURE — 76819 PR US, OB, FETAL BIOPHYSICAL, W/O NST: ICD-10-PCS | Mod: 26,,, | Performed by: OBSTETRICS & GYNECOLOGY

## 2020-07-30 PROCEDURE — 76819 FETAL BIOPHYS PROFIL W/O NST: CPT | Mod: 26,,, | Performed by: OBSTETRICS & GYNECOLOGY

## 2020-07-30 PROCEDURE — 82947 ASSAY GLUCOSE BLOOD QUANT: CPT

## 2020-07-30 PROCEDURE — 36415 COLL VENOUS BLD VENIPUNCTURE: CPT

## 2020-07-30 RX ADMIN — AMOXICILLIN 500 MG: 400 POWDER, FOR SUSPENSION ORAL at 04:07

## 2020-07-30 RX ADMIN — AMOXICILLIN 500 MG: 400 POWDER, FOR SUSPENSION ORAL at 02:07

## 2020-07-30 RX ADMIN — MINERAL SUPPLEMENT IRON 300 MG / 5 ML STRENGTH LIQUID 100 PER BOX UNFLAVORED 300 MG: at 02:07

## 2020-07-30 RX ADMIN — AMOXICILLIN 500 MG: 400 POWDER, FOR SUSPENSION ORAL at 09:07

## 2020-07-30 NOTE — SUBJECTIVE & OBJECTIVE
Obstetric HPI:  Denies contractions, complains of intermittent pelvic pressure, worse over past 12 hours, denies vaginal bleeding, reports decreasing LOF when standing.  Fetal Movement: normal.         Objective:     Vital Signs (Most Recent):  Temp: 98.6 °F (37 °C) (07/29/20 1917)  Pulse: 101 (07/29/20 1917)  Resp: 18 (07/29/20 1917)  BP: (!) 102/57 (07/29/20 1917)  SpO2: 100 % (07/29/20 1917) Vital Signs (24h Range):  Temp:  [97.8 °F (36.6 °C)-98.6 °F (37 °C)] 98.6 °F (37 °C)  Pulse:  [] 101  Resp:  [18] 18  SpO2:  [100 %] 100 %  BP: ()/(53-70) 102/57     Weight: 50.8 kg (112 lb)  Body mass index is 21.87 kg/m².    FHT: from 7/29 40 minute evening monitor   A: 130 moderate variability, + accels, 1 variable decels noted   B: 140 moderate variability, + accels, - decels    Both babies reactive & reassuring  TOCO: baseline uterine irritability with 1 ctx over duration of monitoring      Cervical Exam: checked at 2/60/-3 7/23 at 2050  Dilation:  2  Effacement:  50%  Station: -3   Presentation: Vertex/Transverse per bedside ultrasound in the [facility] 7/23  Spec Exam 2/60/-3, unchanged, 7/28 at 2000     Significant Labs:  Recent Lab Results       07/23/20  1528   07/23/20  1406   07/23/20  1250   07/23/20  1239        Albumin 2.6           Alkaline Phosphatase 139           ALT 5           Anion Gap 10           Aniso     Slight       AST 12           Basophil%     0.0       BILIRUBIN TOTAL 0.4  Comment:  For infants and newborns, interpretation of results should be based  on gestational age, weight and in agreement with clinical  observations.  Premature Infant recommended reference ranges:  Up to 24 hours.............<8.0 mg/dL  Up to 48 hours............<12.0 mg/dL  3-5 days..................<15.0 mg/dL  6-29 days.................<15.0 mg/dL             BUN, Bld 4           Calcium 8.4           Chloride 107           CO2 21           Creatinine 0.5           Differential Method     Manual       eGFR if   >60           eGFR if non  >60  Comment:  Calculation used to obtain the estimated glomerular filtration  rate (eGFR) is the CKD-EPI equation.              Eosinophil%     0.0       Fetal Fibronectin       Positive     Glucose 95           Gran%     85.0       Group & Rh     B POS       Hematocrit     26.7       Hemoglobin     8.3       Hypo     Occasional       Immature Granulocytes     CANCELED  Comment:  Result canceled by the ancillary.       INDIRECT DHAVAL     NEG       Lymph%     9.0       MCH     21.7       MCHC     31.1       MCV     70       Metamyelocytes     2.0       Mono%     4.0       MPV     10.4       nRBC     0       Platelet Estimate     Appears normal       Platelets     212       Potassium 3.8           PROTEIN TOTAL 6.8           RBC     3.82       RDW     16.1       Sodium 138           WBC     10.25             Physical Exam:   Constitutional: She is oriented to person, place, and time. She appears well-developed and well-nourished. No distress.    HENT:   Head: Normocephalic and atraumatic.    Eyes: Pupils are equal, round, and reactive to light. EOM are normal.    Neck: Normal range of motion.    Cardiovascular: Normal rate and regular rhythm.     Pulmonary/Chest: Effort normal.   Normal work of breathing          Abdominal: Soft. She exhibits no distension. There is no abdominal tenderness. There is no rebound and no guarding.   Gravid uterus, appropriate for gestational age/twin pregnancy             Musculoskeletal: Normal range of motion. No tenderness or edema.       Neurological: She is alert and oriented to person, place, and time.    Skin: Skin is warm and dry.

## 2020-07-30 NOTE — ASSESSMENT & PLAN NOTE
-7/16: presented to the [facility] with concern for gush of fluid, negative rupture exam, bedside ultrasound demonstrated two pockets of fluid across the visible membrane in both A and B MVP > 2  -7/23: presented to MFM appointment complaining of continued trickling since 7/16 clear fluid  -7/23: MFM ultrasound demonstrated multiple sites of sac separation in this US, concerning for CMS (chorioamniotic membrane separation). Given patient's self described concerns about PPROM (see note from OB ED on 07/16), this fits known data concerning PPROM of one or both sacs confined by membranes  -7/23: In [facility], rupture exam negative, bedside ultrasound revealed same pocket of fluid between the two amniotic membranes  -discussed with MFM, decision to admit to antepartum  - 7/27: MVP normal on limited ultrasound  PPROM at 31w4d    - now on D#8 of PPROM antibiotics    - continue to monitor closely for signs of infection

## 2020-07-30 NOTE — PROGRESS NOTES
[facility]  Obstetrics  Antepartum Progress Note    Patient Name: Fany May  MRN: [id number]  Admission Date: 2020  Hospital Length of Stay: 7 days  Attending Physician: Marcos Hudson MD  Primary Care Provider: Primary Doctor No    Subjective:     Principal Problem: premature rupture of membranes (PPROM) with unknown onset of labor    HPI:  Fany May is a 25 y.o. [id number] at 31w4d with di-di twin pregnancy who presents from [facility] clinic for concern for rupture of membranes. Patient endorses loss of fluid since , when she presented to the [facility] and had a negative rupture exam, bedside ultrasound demonstrated membrane with fluid pockets on both sides. She also endorses contractions that have increased in frequency and intensity. Denies vaginal bleeding,   Fetal Movement: normal.     MFM: BPP of 8/8 for A and B. Multiple sites of sac separation between twin A and twin B were found on MFM ultrasound concerning for chorioamniotic membrane separation and PPROM.     With MFM ultrasound and given patient's self described concerns about PPROM, this fits known data   concerning pPROM of one or both sacs confined by membranes.     Pregnancy also complicated by FGR 8th percentile A , B 11%.        Hospital Course:  2020 Overnight patient endorsed increased contractions with pain increasing from 3 to 5. Patient cervix was re-checked and found to be unchanged. Since then, the patient's contractions have spaced out to q10-20 minutes. Patient is still feeling them and rating them at 5/10 pain. Continues to endorse trickling of fluid. Denies vaginal bleeding. Endorses fetal movement.  2020 Called to patient's room once overnight. Placed on FHT for 1 hour, no contractions noted on monitor. Called out again for a different constant abdominal pain. SSE performed, cervix visually unchanged 1-2 cm, white discharge noted, but no vaginal pooling or fluid noted. Pain relieved by  "tylenol. Continues to endorse trickling of fluid. Denies vaginal bleeding. Endorses fetal movement.  07/26/2020 NSTs reassuring yesterday; patient denies any pain and did not experience any pain yesterday; endorses fetal movement; no bleeding; no contractions  07/27/2020 NST reassuring yesterday. Pt reports continued minimal leakage of clear fluid, worse when standing or with fetal movement. She complains of burning epigastric pain, worse when laying down. No vaginal bleeding, no contractions.  07/28/2020 NST reassuring yesterday. Patient reports continued minimal leakage of clear fluid, worse when standing & ambulating. Epigastric pain has improved with PPI. Feeling abdominal "tightening" 2 times per day, no regular contractions, no vaginal bleeding, endorses regular fetal movement. Pt complains of occasional mild HA, improves spontaneously. Denies visual changes, CP, SOB, RUQ pain.  07/29/2020 NST reassuring yesterday. Patient feels leakage of fluid has improved. Epigastric pain and HA have resolved. Still feeling abdominal tightening and cramping intermittently, unable to specify frequency, was able to sleep through the night. One episode of increased pelvic pressure at 2000, spec exam was unchanged 2/60/-3. Patient feeling very down about staying in the hospital for an extended period.   07/30/2020 NST reassuring yesterday. Patient feels leakage of fluid continues to decrease but still present when standing. Complains of intermittent pelvic pressure, worsening over past 12 hours. Patient denying symptoms of depression and declines SSRI at this time.    Obstetric HPI:  Denies contractions, complains of intermittent pelvic pressure, worse over past 12 hours, denies vaginal bleeding, reports decreasing LOF when standing.  Fetal Movement: normal.         Objective:     Vital Signs (Most Recent):  Temp: 98.6 °F (37 °C) (07/29/20 1917)  Pulse: 101 (07/29/20 1917)  Resp: 18 (07/29/20 1917)  BP: (!) 102/57 (07/29/20 " 1917)  SpO2: 100 % (07/29/20 1917) Vital Signs (24h Range):  Temp:  [97.8 °F (36.6 °C)-98.6 °F (37 °C)] 98.6 °F (37 °C)  Pulse:  [] 101  Resp:  [18] 18  SpO2:  [100 %] 100 %  BP: ()/(53-70) 102/57     Weight: 50.8 kg (112 lb)  Body mass index is 21.87 kg/m².    FHT: from 7/29 40 minute evening monitor   A: 130 moderate variability, + accels, 1 variable decels noted   B: 140 moderate variability, + accels, - decels    Both babies reactive & reassuring  TOCO: baseline uterine irritability with 1 ctx over duration of monitoring      Cervical Exam: checked at 2/60/-3 7/23 at 2050  Dilation:  2  Effacement:  50%  Station: -3   Presentation: Vertex/Transverse per bedside ultrasound in the [facility] 7/23  Spec Exam 2/60/-3, unchanged, 7/28 at 2000     Significant Labs:  Recent Lab Results       07/23/20  1528   07/23/20  1406   07/23/20  1250   07/23/20  1239        Albumin 2.6           Alkaline Phosphatase 139           ALT 5           Anion Gap 10           Aniso     Slight       AST 12           Basophil%     0.0       BILIRUBIN TOTAL 0.4  Comment:  For infants and newborns, interpretation of results should be based  on gestational age, weight and in agreement with clinical  observations.  Premature Infant recommended reference ranges:  Up to 24 hours.............<8.0 mg/dL  Up to 48 hours............<12.0 mg/dL  3-5 days..................<15.0 mg/dL  6-29 days.................<15.0 mg/dL             BUN, Bld 4           Calcium 8.4           Chloride 107           CO2 21           Creatinine 0.5           Differential Method     Manual       eGFR if  >60           eGFR if non  >60  Comment:  Calculation used to obtain the estimated glomerular filtration  rate (eGFR) is the CKD-EPI equation.              Eosinophil%     0.0       Fetal Fibronectin       Positive     Glucose 95           Gran%     85.0       Group & Rh     B POS       Hematocrit     26.7       Hemoglobin     8.3        Hypo     Occasional       Immature Granulocytes     CANCELED  Comment:  Result canceled by the ancillary.       INDIRECT DHAVAL     NEG       Lymph%     9.0       MCH     21.7       MCHC     31.1       MCV     70       Metamyelocytes     2.0       Mono%     4.0       MPV     10.4       nRBC     0       Platelet Estimate     Appears normal       Platelets     212       Potassium 3.8           PROTEIN TOTAL 6.8           RBC     3.82       RDW     16.1       Sodium 138           WBC     10.25             Physical Exam:   Constitutional: She is oriented to person, place, and time. She appears well-developed and well-nourished. No distress.    HENT:   Head: Normocephalic and atraumatic.    Eyes: Pupils are equal, round, and reactive to light. EOM are normal.    Neck: Normal range of motion.    Cardiovascular: Normal rate and regular rhythm.     Pulmonary/Chest: Effort normal.   Normal work of breathing          Abdominal: Soft. She exhibits no distension. There is no abdominal tenderness. There is no rebound and no guarding.   Gravid uterus, appropriate for gestational age/twin pregnancy             Musculoskeletal: Normal range of motion. No tenderness or edema.       Neurological: She is alert and oriented to person, place, and time.    Skin: Skin is warm and dry.        Assessment/Plan:     25 y.o. female  at 32w4d for:    *  premature rupture of membranes (PPROM) with unknown onset of labor  -: presented to the [facility] with concern for gush of fluid, negative rupture exam, bedside ultrasound demonstrated two pockets of fluid across the visible membrane in both A and B MVP > 2  -: presented to MFM appointment complaining of continued trickling since  clear fluid  -: MFM ultrasound demonstrated multiple sites of sac separation in this US, concerning for CMS (chorioamniotic membrane separation). Given patient's self described concerns about PPROM (see note from OB ED on ), this fits  known data concerning PPROM of one or both sacs confined by membranes  -: In [facility], rupture exam negative, bedside ultrasound revealed same pocket of fluid between the two amniotic membranes  -discussed with MFM, decision to admit to antepartum  - : MVP normal on limited ultrasound  PPROM at 31w4d    - now on D#8 of PPROM antibiotics    - continue to monitor closely for signs of infection     31 weeks gestation of pregnancy  - Consents for Delivery and Blood signed  - BMZ course complete -  - GBS neg  - Monitor fetal status closely    - fetal position confirmed as cephalic/breech   - normal MVP on limited ultrasound   - FHT reactive and reassuring, TOCO ctx q1-8min with baseline uterine irritability  - Last US on : EFW was A: 1340 (8th percent), B 1427 g (11%)  - As EFW now likely > 1500 and given GA, DARRELL may be considered if OB provider in house is comfortable with breech extraction, risks discussed with patient, if not, would be for  at 34w  - Diet Regular, NST BID      Iron deficiency anemia  - H/H on admit   - Iron studies:   - Iron 19   - TIBC 670   - Saturated iron 3   - Transferrin 453   - Ferritin 8  - On iron BID and scheduled colace    Desires immediate post-placental Mirena  - Medicaid  -will order if patient proceeds with delivery    Sickle cell trait  - H/H on admit                   Sasha Aponte MD  Obstetrics  [facility]

## 2020-07-31 LAB
ANISOCYTOSIS BLD QL SMEAR: SLIGHT
BASOPHILS NFR BLD: 0 % (ref 0–1.9)
BLD PROD TYP BPU: NORMAL
BLOOD UNIT EXPIRATION DATE: NORMAL
BLOOD UNIT TYPE CODE: 7300
BLOOD UNIT TYPE: NORMAL
CODING SYSTEM: NORMAL
DIFFERENTIAL METHOD: ABNORMAL
DISPENSE STATUS: NORMAL
EOSINOPHIL NFR BLD: 2 % (ref 0–8)
ERYTHROCYTE [DISTWIDTH] IN BLOOD BY AUTOMATED COUNT: 17.2 % (ref 11.5–14.5)
GLUCOSE SERPL-MCNC: 90 MG/DL (ref 70–110)
HCT VFR BLD AUTO: 23.5 % (ref 37–48.5)
HGB BLD-MCNC: 7.4 G/DL (ref 12–16)
HYPOCHROMIA BLD QL SMEAR: ABNORMAL
IMM GRANULOCYTES # BLD AUTO: ABNORMAL K/UL (ref 0–0.04)
IMM GRANULOCYTES NFR BLD AUTO: ABNORMAL % (ref 0–0.5)
LYMPHOCYTES NFR BLD: 19 % (ref 18–48)
MCH RBC QN AUTO: 21.8 PG (ref 27–31)
MCHC RBC AUTO-ENTMCNC: 31.5 G/DL (ref 32–36)
MCV RBC AUTO: 69 FL (ref 82–98)
METAMYELOCYTES NFR BLD MANUAL: 2 %
MONOCYTES NFR BLD: 2 % (ref 4–15)
MYELOCYTES NFR BLD MANUAL: 1 %
NEUTROPHILS NFR BLD: 71 % (ref 38–73)
NEUTS BAND NFR BLD MANUAL: 3 %
NRBC BLD-RTO: 0 /100 WBC
PLATELET # BLD AUTO: 221 K/UL (ref 150–350)
PLATELET BLD QL SMEAR: ABNORMAL
PMV BLD AUTO: 10.8 FL (ref 9.2–12.9)
POCT GLUCOSE: 118 MG/DL (ref 70–110)
POCT GLUCOSE: 124 MG/DL (ref 70–110)
RBC # BLD AUTO: 3.39 M/UL (ref 4–5.4)
TRANS ERYTHROCYTES VOL PATIENT: NORMAL ML
WBC # BLD AUTO: 13.87 K/UL (ref 3.9–12.7)

## 2020-07-31 PROCEDURE — 36415 COLL VENOUS BLD VENIPUNCTURE: CPT

## 2020-07-31 PROCEDURE — 59025 PR FETAL 2N-STRESS TEST: ICD-10-PCS | Mod: 26,59,, | Performed by: OBSTETRICS & GYNECOLOGY

## 2020-07-31 PROCEDURE — 36430 TRANSFUSION BLD/BLD COMPNT: CPT

## 2020-07-31 PROCEDURE — 59025 FETAL NON-STRESS TEST: CPT | Mod: 26,,, | Performed by: OBSTETRICS & GYNECOLOGY

## 2020-07-31 PROCEDURE — 25000003 PHARM REV CODE 250: Performed by: STUDENT IN AN ORGANIZED HEALTH CARE EDUCATION/TRAINING PROGRAM

## 2020-07-31 PROCEDURE — 99231 PR SUBSEQUENT HOSPITAL CARE,LEVL I: ICD-10-PCS | Mod: 25,,, | Performed by: OBSTETRICS & GYNECOLOGY

## 2020-07-31 PROCEDURE — 85027 COMPLETE CBC AUTOMATED: CPT

## 2020-07-31 PROCEDURE — 85007 BL SMEAR W/DIFF WBC COUNT: CPT

## 2020-07-31 PROCEDURE — 82947 ASSAY GLUCOSE BLOOD QUANT: CPT

## 2020-07-31 PROCEDURE — 99231 SBSQ HOSP IP/OBS SF/LOW 25: CPT | Mod: 25,,, | Performed by: OBSTETRICS & GYNECOLOGY

## 2020-07-31 PROCEDURE — 11000001 HC ACUTE MED/SURG PRIVATE ROOM

## 2020-07-31 PROCEDURE — 59025 FETAL NON-STRESS TEST: CPT

## 2020-07-31 PROCEDURE — P9021 RED BLOOD CELLS UNIT: HCPCS

## 2020-07-31 RX ORDER — HYDROCODONE BITARTRATE AND ACETAMINOPHEN 500; 5 MG/1; MG/1
TABLET ORAL
Status: DISCONTINUED | OUTPATIENT
Start: 2020-07-31 | End: 2020-08-10

## 2020-07-31 RX ADMIN — MINERAL SUPPLEMENT IRON 300 MG / 5 ML STRENGTH LIQUID 100 PER BOX UNFLAVORED 300 MG: at 10:07

## 2020-07-31 RX ADMIN — AMOXICILLIN 500 MG: 400 POWDER, FOR SUSPENSION ORAL at 07:07

## 2020-07-31 RX ADMIN — AMOXICILLIN 500 MG: 400 POWDER, FOR SUSPENSION ORAL at 12:07

## 2020-07-31 RX ADMIN — AMOXICILLIN 500 MG: 400 POWDER, FOR SUSPENSION ORAL at 05:07

## 2020-07-31 NOTE — ASSESSMENT & PLAN NOTE
-7/16: presented to the [facility] with concern for gush of fluid, negative rupture exam, bedside ultrasound demonstrated two pockets of fluid across the visible membrane in both A and B MVP > 2  -7/23: presented to MFM appointment complaining of continued trickling since 7/16 clear fluid  -7/23: MFM ultrasound demonstrated multiple sites of sac separation in this US, concerning for CMS (chorioamniotic membrane separation). Given patient's self described concerns about PPROM (see note from OB ED on 07/16), this fits known data concerning PPROM of one or both sacs confined by membranes  -7/23: In [facility], rupture exam negative, bedside ultrasound revealed same pocket of fluid between the two amniotic membranes  -discussed with MFM, decision to admit to antepartum  - 7/27: MVP normal on limited ultrasound  - 7/30: feeling increased painful ctx, [id number] on TOCO, now resolved  PPROM at 31w4d    - now on D#9 of PPROM antibiotics    - continue to monitor closely for signs of infection

## 2020-07-31 NOTE — SUBJECTIVE & OBJECTIVE
Obstetric HPI:  Intermittent cramping abdominal pain & pelvic pressure last night, now resolved. Denies vaginal bleeding, reports decreasing LOF when standing.  Fetal Movement: normal.         Objective:     Vital Signs (Most Recent):  Temp: 98.2 °F (36.8 °C) (07/31/20 0410)  Pulse: 95 (07/31/20 0410)  Resp: 16 (07/31/20 0410)  BP: (!) 103/55 (07/31/20 0410)  SpO2: 97 % (07/31/20 0410) Vital Signs (24h Range):  Temp:  [98.2 °F (36.8 °C)-98.4 °F (36.9 °C)] 98.2 °F (36.8 °C)  Pulse:  [] 95  Resp:  [16-18] 16  SpO2:  [96 %-100 %] 97 %  BP: (103-113)/(55-58) 103/55     Weight: 50.8 kg (112 lb)  Body mass index is 21.87 kg/m².    FHT: from 7/31 2038-0950   A: 135 moderate variability, + accels, 1 variable decels noted   B: 140 moderate variability, + accels, - decels    Both babies reactive & reassuring  TOCO: baseline uterine irritability with ctx [id number]      Cervical Exam: checked at 2/60/-3 7/23 at 2050  Dilation:  2  Effacement:  50%  Station: -3   Presentation: Vertex/Breech  Spec Exam 7/28: 2/60/-3, unchanged     Significant Labs:  Recent Lab Results       07/23/20  1528   07/23/20  1406   07/23/20  1250   07/23/20  1239        Albumin 2.6           Alkaline Phosphatase 139           ALT 5           Anion Gap 10           Aniso     Slight       AST 12           Basophil%     0.0       BILIRUBIN TOTAL 0.4  Comment:  For infants and newborns, interpretation of results should be based  on gestational age, weight and in agreement with clinical  observations.  Premature Infant recommended reference ranges:  Up to 24 hours.............<8.0 mg/dL  Up to 48 hours............<12.0 mg/dL  3-5 days..................<15.0 mg/dL  6-29 days.................<15.0 mg/dL             BUN, Bld 4           Calcium 8.4           Chloride 107           CO2 21           Creatinine 0.5           Differential Method     Manual       eGFR if  >60           eGFR if non  >60  Comment:  Calculation used  to obtain the estimated glomerular filtration  rate (eGFR) is the CKD-EPI equation.              Eosinophil%     0.0       Fetal Fibronectin       Positive     Glucose 95           Gran%     85.0       Group & Rh     B POS       Hematocrit     26.7       Hemoglobin     8.3       Hypo     Occasional       Immature Granulocytes     CANCELED  Comment:  Result canceled by the ancillary.       INDIRECT DHAVAL     NEG       Lymph%     9.0       MCH     21.7       MCHC     31.1       MCV     70       Metamyelocytes     2.0       Mono%     4.0       MPV     10.4       nRBC     0       Platelet Estimate     Appears normal       Platelets     212       Potassium 3.8           PROTEIN TOTAL 6.8           RBC     3.82       RDW     16.1       Sodium 138           WBC     10.25             Physical Exam:   Constitutional: She is oriented to person, place, and time. She appears well-developed and well-nourished. No distress.    HENT:   Head: Normocephalic and atraumatic.    Eyes: Pupils are equal, round, and reactive to light. EOM are normal.    Neck: Normal range of motion.    Cardiovascular: Normal rate and regular rhythm.     Pulmonary/Chest: Effort normal.   Normal work of breathing          Abdominal: Soft. She exhibits no distension. There is no abdominal tenderness. There is no rebound and no guarding.   Gravid uterus, appropriate for gestational age/twin pregnancy             Musculoskeletal: Normal range of motion. No tenderness or edema.       Neurological: She is alert and oriented to person, place, and time.    Skin: Skin is warm and dry.

## 2020-07-31 NOTE — NURSING
Pt called nurses station stating that she is feeling contractions and would like to be put on the monitor. EFM and TOCO applied. Dr. Farias notified. Will monitor.

## 2020-07-31 NOTE — PROGRESS NOTES
PM NST    FHT A: 145 bpm, moderate variability, +accels, no decels. Category 1 (reassuring)  FHT B: 145 bpm, moderate variability, +accels, no decels. Category 1 (reassuring)  CTX: Occasional, irregular. 2 contractions in about 45 minutes.      -- Continue current plan of care.  -- NST BID      Mercedes Reyes MD  OBGYN PGY-3

## 2020-07-31 NOTE — PLAN OF CARE
Pt doing well.  VS stable. No acute changes this shift. Pt endorses LOF; denies vaginal bleeding,  and contractions.  Pt reports positive fetal movement. Plan of care reviewed with patient. All questions answered at this time.

## 2020-07-31 NOTE — NURSING
Results for PRANEETH PRECIADO (MRN [id number]) as of 7/31/2020 16:32   Ref. Range 7/31/2020 13:28   POCT Glucose Latest Ref Range: 70 - 110 mg/dL 124 (H)   2 hour post breakfast/lunch accucheck; (patient eats very late)

## 2020-07-31 NOTE — PROGRESS NOTES
[facility]  Obstetrics  Antepartum Progress Note    Patient Name: Fany May  MRN: [id number]  Admission Date: 2020  Hospital Length of Stay: 8 days  Attending Physician: Marcos Hudson MD  Primary Care Provider: Primary Doctor No    Subjective:     Principal Problem: premature rupture of membranes (PPROM) with unknown onset of labor    HPI:  Fany May is a 25 y.o. [id number] at 31w4d with di-di twin pregnancy who presents from [facility] clinic for concern for rupture of membranes. Patient endorses loss of fluid since , when she presented to the [facility] and had a negative rupture exam, bedside ultrasound demonstrated membrane with fluid pockets on both sides. She also endorses contractions that have increased in frequency and intensity. Denies vaginal bleeding,   Fetal Movement: normal.     MFM: BPP of 8/8 for A and B. Multiple sites of sac separation between twin A and twin B were found on MFM ultrasound concerning for chorioamniotic membrane separation and PPROM.     With MFM ultrasound and given patient's self described concerns about PPROM, this fits known data   concerning pPROM of one or both sacs confined by membranes.     Pregnancy also complicated by FGR 8th percentile A , B 11%.        Hospital Course:  2020 Overnight patient endorsed increased contractions with pain increasing from 3 to 5. Patient cervix was re-checked and found to be unchanged. Since then, the patient's contractions have spaced out to q10-20 minutes. Patient is still feeling them and rating them at 5/10 pain. Continues to endorse trickling of fluid. Denies vaginal bleeding. Endorses fetal movement.  2020 Called to patient's room once overnight. Placed on FHT for 1 hour, no contractions noted on monitor. Called out again for a different constant abdominal pain. SSE performed, cervix visually unchanged 1-2 cm, white discharge noted, but no vaginal pooling or fluid noted. Pain relieved by  "tylenol. Continues to endorse trickling of fluid. Denies vaginal bleeding. Endorses fetal movement.  07/26/2020 NSTs reassuring yesterday; patient denies any pain and did not experience any pain yesterday; endorses fetal movement; no bleeding; no contractions  07/27/2020 NST reassuring yesterday. Pt reports continued minimal leakage of clear fluid, worse when standing or with fetal movement. She complains of burning epigastric pain, worse when laying down. No vaginal bleeding, no contractions.  07/28/2020 NST reassuring yesterday. Patient reports continued minimal leakage of clear fluid, worse when standing & ambulating. Epigastric pain has improved with PPI. Feeling abdominal "tightening" 2 times per day, no regular contractions, no vaginal bleeding, endorses regular fetal movement. Pt complains of occasional mild HA, improves spontaneously. Denies visual changes, CP, SOB, RUQ pain.  07/29/2020 NST reassuring yesterday. Patient feels leakage of fluid has improved. Epigastric pain and HA have resolved. Still feeling abdominal tightening and cramping intermittently, unable to specify frequency, was able to sleep through the night. One episode of increased pelvic pressure at 2000, spec exam was unchanged 2/60/-3. Patient feeling very down about staying in the hospital for an extended period.   07/30/2020 NST reassuring yesterday. Patient feels leakage of fluid continues to decrease but still present when standing. Complains of intermittent pelvic pressure, worsening over past 12 hours. Patient denying symptoms of depression and declines SSRI at this time.  07/31/2020 Patient felt increasing pelvic pressure last night & placed on monitor, FHT was reactive & reassuring, TOCO with ctx [id number]. No abdominal pain/pressure at present. Patient still endorsing mild leakage of fluid. Still having epigastric burning with PO intake, unable to tolerate liquid famotidine but unwilling to try to swallow pills at this " time.    Obstetric HPI:  Intermittent cramping abdominal pain & pelvic pressure last night, now resolved. Denies vaginal bleeding, reports decreasing LOF when standing.  Fetal Movement: normal.         Objective:     Vital Signs (Most Recent):  Temp: 98.2 °F (36.8 °C) (07/31/20 0410)  Pulse: 95 (07/31/20 0410)  Resp: 16 (07/31/20 0410)  BP: (!) 103/55 (07/31/20 0410)  SpO2: 97 % (07/31/20 0410) Vital Signs (24h Range):  Temp:  [98.2 °F (36.8 °C)-98.4 °F (36.9 °C)] 98.2 °F (36.8 °C)  Pulse:  [] 95  Resp:  [16-18] 16  SpO2:  [96 %-100 %] 97 %  BP: (103-113)/(55-58) 103/55     Weight: 50.8 kg (112 lb)  Body mass index is 21.87 kg/m².    FHT: from 7/31 [id number]   A: 135 moderate variability, + accels, 1 variable decels noted   B: 140 moderate variability, + accels, - decels    Both babies reactive & reassuring  TOCO: baseline uterine irritability with ctx [id number]      Cervical Exam: checked at 2/60/-3 7/23 at 2050  Dilation:  2  Effacement:  50%  Station: -3   Presentation: Vertex/Breech  Spec Exam 7/28: 2/60/-3, unchanged     Significant Labs:  Recent Lab Results       07/23/20  1528   07/23/20  1406   07/23/20  1250   07/23/20  1239        Albumin 2.6           Alkaline Phosphatase 139           ALT 5           Anion Gap 10           Aniso     Slight       AST 12           Basophil%     0.0       BILIRUBIN TOTAL 0.4  Comment:  For infants and newborns, interpretation of results should be based  on gestational age, weight and in agreement with clinical  observations.  Premature Infant recommended reference ranges:  Up to 24 hours.............<8.0 mg/dL  Up to 48 hours............<12.0 mg/dL  3-5 days..................<15.0 mg/dL  6-29 days.................<15.0 mg/dL             BUN, Bld 4           Calcium 8.4           Chloride 107           CO2 21           Creatinine 0.5           Differential Method     Manual       eGFR if  >60           eGFR if non   >60  Comment:  Calculation used to obtain the estimated glomerular filtration  rate (eGFR) is the CKD-EPI equation.              Eosinophil%     0.0       Fetal Fibronectin       Positive     Glucose 95           Gran%     85.0       Group & Rh     B POS       Hematocrit     26.7       Hemoglobin     8.3       Hypo     Occasional       Immature Granulocytes     CANCELED  Comment:  Result canceled by the ancillary.       INDIRECT DHAVAL     NEG       Lymph%     9.0       MCH     21.7       MCHC     31.1       MCV     70       Metamyelocytes     2.0       Mono%     4.0       MPV     10.4       nRBC     0       Platelet Estimate     Appears normal       Platelets     212       Potassium 3.8           PROTEIN TOTAL 6.8           RBC     3.82       RDW     16.1       Sodium 138           WBC     10.25             Physical Exam:   Constitutional: She is oriented to person, place, and time. She appears well-developed and well-nourished. No distress.    HENT:   Head: Normocephalic and atraumatic.    Eyes: Pupils are equal, round, and reactive to light. EOM are normal.    Neck: Normal range of motion.    Cardiovascular: Normal rate and regular rhythm.     Pulmonary/Chest: Effort normal.   Normal work of breathing          Abdominal: Soft. She exhibits no distension. There is no abdominal tenderness. There is no rebound and no guarding.   Gravid uterus, appropriate for gestational age/twin pregnancy             Musculoskeletal: Normal range of motion. No tenderness or edema.       Neurological: She is alert and oriented to person, place, and time.    Skin: Skin is warm and dry.        Assessment/Plan:     25 y.o. female  at 32w5d for:    *  premature rupture of membranes (PPROM) with unknown onset of labor  -: presented to the [facility] with concern for gush of fluid, negative rupture exam, bedside ultrasound demonstrated two pockets of fluid across the visible membrane in both A and B MVP > 2  -: presented  to MFM appointment complaining of continued trickling since  clear fluid  -: MFM ultrasound demonstrated multiple sites of sac separation in this US, concerning for CMS (chorioamniotic membrane separation). Given patient's self described concerns about PPROM (see note from OB ED on ), this fits known data concerning PPROM of one or both sacs confined by membranes  -: In [facility], rupture exam negative, bedside ultrasound revealed same pocket of fluid between the two amniotic membranes  -discussed with MFM, decision to admit to antepartum  - : MVP normal on limited ultrasound  - : feeling increased painful ctx, [id number] on TOCO, now resolved  PPROM at 31w4d    - now on D#9 of PPROM antibiotics    - continue to monitor closely for signs of infection     31 weeks gestation of pregnancy  - Consents for Delivery and Blood signed  - BMZ course complete -  - GBS neg  - Monitor fetal status closely    - fetal position confirmed as cephalic/breech   - normal MVP on limited ultrasound   - FHT reactive and reassuring, TOCO ctx [id number] with baseline uterine irritability  - Last US on : EFW was A: 1340 (8th percent), B 1427 g (11%)  - As EFW now likely > 1500 and given GA, DARRELL may be considered if OB provider in house is comfortable with breech extraction, risks discussed with patient, if not, would be for  at 34w  - Diet Regular, NST BID    Iron deficiency anemia  - H/H on admit .  - Iron studies:   - Iron 19   - TIBC 670   - Saturated iron 3   - Transferrin 453   - Ferritin 8  - On iron BID and scheduled colace  -Repeat CBC today    Desires immediate post-placental Mirena  - Medicaid  -will order if patient proceeds with delivery    Sickle cell trait  - H/H on admit ..7      Naima Aponte MD  OBGYN PGY-2

## 2020-07-31 NOTE — ASSESSMENT & PLAN NOTE
- Consents for Delivery and Blood signed  - BMZ course complete -  - GBS neg  - Monitor fetal status closely    - fetal position confirmed as cephalic/breech   - normal MVP on limited ultrasound   - FHT reactive and reassuring, TOCO ctx [id number] with baseline uterine irritability  - Last US on : EFW was A: 1340 (8th percent), B 1427 g (11%)  - As EFW now likely > 1500 and given GA, DARRELL may be considered if OB provider in house is comfortable with breech extraction, risks discussed with patient, if not, would be for  at 34w  - Diet Regular, NST BID

## 2020-07-31 NOTE — PROGRESS NOTES
Hemoglobin 7.4. Iron deficiency anemia. Decrease from 8.6 a week ago. Discussed with patient that we recommend a blood transfusion. Patient understands and agrees.    Will give patient 1 unit of pRBC.    Naima Aponte MD  OBGYN PGY-2

## 2020-08-01 LAB
ABO + RH BLD: NORMAL
ANISOCYTOSIS BLD QL SMEAR: SLIGHT
BASOPHILS NFR BLD: 1 % (ref 0–1.9)
BLD GP AB SCN CELLS X3 SERPL QL: NORMAL
DIFFERENTIAL METHOD: ABNORMAL
EOSINOPHIL NFR BLD: 2 % (ref 0–8)
ERYTHROCYTE [DISTWIDTH] IN BLOOD BY AUTOMATED COUNT: 18.8 % (ref 11.5–14.5)
GLUCOSE SERPL-MCNC: 98 MG/DL (ref 70–110)
HCT VFR BLD AUTO: 27.3 % (ref 37–48.5)
HGB BLD-MCNC: 8.7 G/DL (ref 12–16)
HYPOCHROMIA BLD QL SMEAR: ABNORMAL
IMM GRANULOCYTES # BLD AUTO: ABNORMAL K/UL (ref 0–0.04)
IMM GRANULOCYTES NFR BLD AUTO: ABNORMAL % (ref 0–0.5)
LYMPHOCYTES NFR BLD: 22 % (ref 18–48)
MCH RBC QN AUTO: 22.7 PG (ref 27–31)
MCHC RBC AUTO-ENTMCNC: 31.9 G/DL (ref 32–36)
MCV RBC AUTO: 71 FL (ref 82–98)
METAMYELOCYTES NFR BLD MANUAL: 3 %
MONOCYTES NFR BLD: 5 % (ref 4–15)
MYELOCYTES NFR BLD MANUAL: 3 %
NEUTROPHILS NFR BLD: 62 % (ref 38–73)
NEUTS BAND NFR BLD MANUAL: 2 %
NRBC BLD-RTO: 0 /100 WBC
PLATELET # BLD AUTO: 223 K/UL (ref 150–350)
PLATELET BLD QL SMEAR: ABNORMAL
PMV BLD AUTO: 10 FL (ref 9.2–12.9)
POCT GLUCOSE: 100 MG/DL (ref 70–110)
POCT GLUCOSE: 123 MG/DL (ref 70–110)
POCT GLUCOSE: 88 MG/DL (ref 70–110)
POLYCHROMASIA BLD QL SMEAR: ABNORMAL
RBC # BLD AUTO: 3.83 M/UL (ref 4–5.4)
WBC # BLD AUTO: 14.64 K/UL (ref 3.9–12.7)

## 2020-08-01 PROCEDURE — 82947 ASSAY GLUCOSE BLOOD QUANT: CPT

## 2020-08-01 PROCEDURE — 25000003 PHARM REV CODE 250: Performed by: STUDENT IN AN ORGANIZED HEALTH CARE EDUCATION/TRAINING PROGRAM

## 2020-08-01 PROCEDURE — 85007 BL SMEAR W/DIFF WBC COUNT: CPT

## 2020-08-01 PROCEDURE — 36415 COLL VENOUS BLD VENIPUNCTURE: CPT

## 2020-08-01 PROCEDURE — 86850 RBC ANTIBODY SCREEN: CPT

## 2020-08-01 PROCEDURE — 59025 FETAL NON-STRESS TEST: CPT | Mod: 26,,, | Performed by: OBSTETRICS & GYNECOLOGY

## 2020-08-01 PROCEDURE — 59025 PR FETAL 2N-STRESS TEST: ICD-10-PCS | Mod: 26,59,, | Performed by: OBSTETRICS & GYNECOLOGY

## 2020-08-01 PROCEDURE — 99231 SBSQ HOSP IP/OBS SF/LOW 25: CPT | Mod: 25,,, | Performed by: OBSTETRICS & GYNECOLOGY

## 2020-08-01 PROCEDURE — 99231 PR SUBSEQUENT HOSPITAL CARE,LEVL I: ICD-10-PCS | Mod: 25,,, | Performed by: OBSTETRICS & GYNECOLOGY

## 2020-08-01 PROCEDURE — 85027 COMPLETE CBC AUTOMATED: CPT

## 2020-08-01 PROCEDURE — 11000001 HC ACUTE MED/SURG PRIVATE ROOM

## 2020-08-01 PROCEDURE — 59025 FETAL NON-STRESS TEST: CPT

## 2020-08-01 RX ADMIN — MINERAL SUPPLEMENT IRON 300 MG / 5 ML STRENGTH LIQUID 100 PER BOX UNFLAVORED 300 MG: at 09:08

## 2020-08-01 RX ADMIN — AMOXICILLIN 500 MG: 400 POWDER, FOR SUSPENSION ORAL at 05:08

## 2020-08-01 RX ADMIN — MINERAL SUPPLEMENT IRON 300 MG / 5 ML STRENGTH LIQUID 100 PER BOX UNFLAVORED 300 MG: at 08:08

## 2020-08-01 RX ADMIN — AMOXICILLIN 500 MG: 400 POWDER, FOR SUSPENSION ORAL at 09:08

## 2020-08-01 RX ADMIN — AMOXICILLIN 500 MG: 400 POWDER, FOR SUSPENSION ORAL at 01:08

## 2020-08-01 NOTE — PROGRESS NOTES
[facility]  Obstetrics  Antepartum Progress Note    Patient Name: Fany May  MRN: [id number]  Admission Date: 2020  Hospital Length of Stay: 9 days  Attending Physician: Marcos Hudson MD  Primary Care Provider: Primary Doctor No    Subjective:     Principal Problem: premature rupture of membranes (PPROM) with unknown onset of labor    HPI:  Fany May is a 25 y.o. [id number] at 31w4d with di-di twin pregnancy who presents from [facility] clinic for concern for rupture of membranes. Patient endorses loss of fluid since , when she presented to the [facility] and had a negative rupture exam, bedside ultrasound demonstrated membrane with fluid pockets on both sides. She also endorses contractions that have increased in frequency and intensity. Denies vaginal bleeding,   Fetal Movement: normal.     MFM: BPP of 8/8 for A and B. Multiple sites of sac separation between twin A and twin B were found on MFM ultrasound concerning for chorioamniotic membrane separation and PPROM.     With MFM ultrasound and given patient's self described concerns about PPROM, this fits known data   concerning pPROM of one or both sacs confined by membranes.     Pregnancy also complicated by FGR 8th percentile A , B 11%.        Hospital Course:  2020 Overnight patient endorsed increased contractions with pain increasing from 3 to 5. Patient cervix was re-checked and found to be unchanged. Since then, the patient's contractions have spaced out to q10-20 minutes. Patient is still feeling them and rating them at 5/10 pain. Continues to endorse trickling of fluid. Denies vaginal bleeding. Endorses fetal movement.  2020 Called to patient's room once overnight. Placed on FHT for 1 hour, no contractions noted on monitor. Called out again for a different constant abdominal pain. SSE performed, cervix visually unchanged 1-2 cm, white discharge noted, but no vaginal pooling or fluid noted. Pain relieved by  "tylenol. Continues to endorse trickling of fluid. Denies vaginal bleeding. Endorses fetal movement.  07/26/2020 NSTs reassuring yesterday; patient denies any pain and did not experience any pain yesterday; endorses fetal movement; no bleeding; no contractions  07/27/2020 NST reassuring yesterday. Pt reports continued minimal leakage of clear fluid, worse when standing or with fetal movement. She complains of burning epigastric pain, worse when laying down. No vaginal bleeding, no contractions.  07/28/2020 NST reassuring yesterday. Patient reports continued minimal leakage of clear fluid, worse when standing & ambulating. Epigastric pain has improved with PPI. Feeling abdominal "tightening" 2 times per day, no regular contractions, no vaginal bleeding, endorses regular fetal movement. Pt complains of occasional mild HA, improves spontaneously. Denies visual changes, CP, SOB, RUQ pain.  07/29/2020 NST reassuring yesterday. Patient feels leakage of fluid has improved. Epigastric pain and HA have resolved. Still feeling abdominal tightening and cramping intermittently, unable to specify frequency, was able to sleep through the night. One episode of increased pelvic pressure at 2000, spec exam was unchanged 2/60/-3. Patient feeling very down about staying in the hospital for an extended period.   07/30/2020 NST reassuring yesterday. Patient feels leakage of fluid continues to decrease but still present when standing. Complains of intermittent pelvic pressure, worsening over past 12 hours. Patient denying symptoms of depression and declines SSRI at this time.  07/31/2020 Patient felt increasing pelvic pressure last night & placed on monitor, FHT was reactive & reassuring, TOCO with ctx [id number]. No abdominal pain/pressure at present. Patient still endorsing mild leakage of fluid. Still having epigastric burning with PO intake, unable to tolerate liquid famotidine but unwilling to try to swallow pills at this " time.  08/01/2020: NST reactive, reassuring. Continues to endorse feeling LOF at times. Transfused 1u pRBC for anemia    Obstetric HPI:  Patient reports None contractions, active fetal movement, absent vaginal bleeding , small loss of fluid      Objective:     Vital Signs (Most Recent):  Temp: 98.2 °F (36.8 °C) (08/01/20 0023)  Pulse: 93 (08/01/20 0023)  Resp: 16 (08/01/20 0023)  BP: (!) 93/54 (08/01/20 0023)  SpO2: (!) 94 % (08/01/20 0023) Vital Signs (24h Range):  Temp:  [97.8 °F (36.6 °C)-98.9 °F (37.2 °C)] 98.2 °F (36.8 °C)  Pulse:  [] 93  Resp:  [16] 16  SpO2:  [94 %-100 %] 94 %  BP: ()/(54-57) 93/54     Weight: 50.8 kg (112 lb)  Body mass index is 21.87 kg/m².    NST reactive, reassuring overnight; See note      Intake/Output Summary (Last 24 hours) at 8/1/2020 0555  Last data filed at 7/31/2020 1617  Gross per 24 hour   Intake 381.67 ml   Output --   Net 381.67 ml     Significant Labs:  Recent Lab Results       07/31/20  2111   07/31/20  1328   07/31/20  0843        Aniso     Slight     BANDS     3.0     Basophil%     0.0     Differential Method     Manual     Eosinophil%     2.0     Glucose, Fasting     90     Gran%     71.0     Hematocrit     23.5     Hemoglobin     7.4     Hypo     Occasional     Immature Grans (Abs)     CANCELED  Comment:  Mild elevation in immature granulocytes is non specific and   can be seen in a variety of conditions including stress response,   acute inflammation, trauma and pregnancy. Correlation with other   laboratory and clinical findings is essential.    Result canceled by the ancillary.       Immature Granulocytes     CANCELED  Comment:  Result canceled by the ancillary.     Lymph%     19.0     MCH     21.8     MCHC     31.5     MCV     69     Metamyelocytes     2.0     Mono%     2.0     MPV     10.8     Myelocytes     1.0     nRBC     0     Platelet Estimate     Appears normal     Platelets     221     POCT Glucose 118 124       RBC     3.39     RDW     17.2      WBC     13.87           Physical Exam:   Constitutional: She is oriented to person, place, and time. She appears well-developed and well-nourished. No distress.    HENT:   Head: Normocephalic and atraumatic.    Eyes: Pupils are equal, round, and reactive to light. EOM are normal.    Neck: Normal range of motion.    Cardiovascular: Normal rate and regular rhythm.     Pulmonary/Chest: Effort normal.   Normal work of breathing          Abdominal: Soft. She exhibits no distension. There is no abdominal tenderness. There is no rebound and no guarding.   Gravid uterus, appropriate for gestational age/twin pregnancy             Musculoskeletal: Normal range of motion. No tenderness or edema.       Neurological: She is alert and oriented to person, place, and time.    Skin: Skin is warm and dry.    Psychiatric: She has a normal mood and affect.       Assessment/Plan:     25 y.o. female  at 32w6d for:    *  premature rupture of membranes (PPROM) with unknown onset of labor  -: presented to the [facility] with concern for gush of fluid, negative rupture exam, bedside ultrasound demonstrated two pockets of fluid across the visible membrane in both A and B MVP > 2  -: presented to MFM appointment complaining of continued trickling since  clear fluid  -: MFM ultrasound demonstrated multiple sites of sac separation in this US, concerning for CMS (chorioamniotic membrane separation). Given patient's self described concerns about PPROM (see note from OB ED on ), this fits known data concerning PPROM of one or both sacs confined by membranes  -: In [facility], rupture exam negative, bedside ultrasound revealed same pocket of fluid between the two amniotic membranes  -discussed with MFM, decision to admit to antepartum  - : MVP normal on limited ultrasound  - : feeling increased painful ctx, [id number] on TOCO, now resolved  PPROM at 31w4d    - now on D#10 of PPROM antibiotics    - continue to  monitor closely for signs of infection     31 weeks gestation of pregnancy  - Consents for Delivery and Blood signed  - BMZ course complete -  - GBS neg  - Monitor fetal status closely    - fetal position confirmed as cephalic/breech   - normal MVP on limited ultrasound   - FHT reactive and reassuring, TOCO ctx [id number] with baseline uterine irritability  - Last US on : EFW was A: 1340 (8th percent), B 1427 g (11%)  - As EFW now likely > 1500 and given GA, DARRELL may be considered if OB provider in house is comfortable with breech extraction, risks discussed with patient, if not, would be for  at 34w  - Diet Regular, NST BID      Iron deficiency anemia  - H/H on admit ..7  - Iron studies:   - Iron 19   - TIBC 670   - Saturated iron 3   - Transferrin 453   - Ferritin 8  - On iron BID and scheduled colace  -  H/H .. Transfused 1u pRBCs.    Desires immediate post-placental Mirena  - Medicaid  -will order if patient proceeds with delivery    Sickle cell trait                JANIE Frances MD  OBGYN PGY2   -----------------------------------------------------------------  Patient seen and examined. Agree with resident note above. Patient s/p 1 unit pRBCs with appropriate rise in H/H. Denies any headaches, dizziness, shortness of breath.   D 10/10 of Latency antibiotics.    Temp:  [97.8 °F (36.6 °C)-98.9 °F (37.2 °C)] 97.9 °F (36.6 °C)  Pulse:  [] 91  Resp:  [16] 16  SpO2:  [94 %-100 %] 98 %  BP: ()/(53-57) 93/54     NST:  Twin A: FHR baseline 140 bpm with moderate variability and 2+ accels, no decels.  Twin B: FHR baseline 150 bpm with moderate variability and 2+ accels, no decels    Continue expectant management with plan for delivery at 34 weeks unless earlier indication arises.    Naiha Mussarat MD  PGY 5  Maternal Fetal Medicine

## 2020-08-01 NOTE — ASSESSMENT & PLAN NOTE
- H/H on admit 8.2/26.7  - Iron studies:   - Iron 19   - TIBC 670   - Saturated iron 3   - Transferrin 453   - Ferritin 8  - On iron BID and scheduled colace  - 7/31 H/H 7.4/23. Transfused 1u pRBCs.

## 2020-08-01 NOTE — ASSESSMENT & PLAN NOTE
-7/16: presented to the [facility] with concern for gush of fluid, negative rupture exam, bedside ultrasound demonstrated two pockets of fluid across the visible membrane in both A and B MVP > 2  -7/23: presented to MFM appointment complaining of continued trickling since 7/16 clear fluid  -7/23: MFM ultrasound demonstrated multiple sites of sac separation in this US, concerning for CMS (chorioamniotic membrane separation). Given patient's self described concerns about PPROM (see note from OB ED on 07/16), this fits known data concerning PPROM of one or both sacs confined by membranes  -7/23: In [facility], rupture exam negative, bedside ultrasound revealed same pocket of fluid between the two amniotic membranes  -discussed with MFM, decision to admit to antepartum  - 7/27: MVP normal on limited ultrasound  - 7/30: feeling increased painful ctx, [id number] on TOCO, now resolved  PPROM at 31w4d    - now on D#10 of PPROM antibiotics    - continue to monitor closely for signs of infection

## 2020-08-01 NOTE — PLAN OF CARE
Vital signs stable, afebrile; reactive Twin NSTs; denies pain, cramping, vaginal bleeding, change in vaginal discharge; 1 unit PRBCs administered for low H&H ; patient tolerated blood; repeat CBC scheduled; 2 hr accucheck done ( patient only ate one meal today)

## 2020-08-01 NOTE — PLAN OF CARE
VSS, afebrile. Pt denies vag bleeding, ctx, changes in LOF. Endorses +FM. 2 hour pp accucheck 118. No acute changes this shift. Plan of care reviewed with pt, no questions at this time.

## 2020-08-01 NOTE — ASSESSMENT & PLAN NOTE
- Consents for Delivery and Blood signed  - BMZ course complete -  - GBS neg  - Monitor fetal status closely    - fetal position confirmed as cephalic/breech   - normal MVP on limited ultrasound   - FHT reactive and reassuring, TOCO ctx [id number] with baseline uterine irritability  - Last US on : EFW was A: 1340 (8th percent), B 1427 g (11%)  - As EFW now likely > 1500 and given GA, DARRELL may be considered if OB provider in house is comfortable with breech extraction, risks discussed with patient, if not, would be for  at 34w  - Diet Regular, NST BID

## 2020-08-01 NOTE — NURSING
Results for PRECIADO JOSE [facility] (MRN [id number]) as of 7/31/2020 21:40   Ref. Range 7/31/2020 21:11   POCT Glucose Latest Ref Range: 70 - 110 mg/dL 118 (H)    2 hr post dinner accucheck

## 2020-08-01 NOTE — PROGRESS NOTES
PM NST:  A: 130, moderate variability, + accels, - decels  B: 125, moderate variability, + accels, - decels    [place]: 2 contractions while on the monitor for 20 minutes.    Naima Aponte MD  OBN PGY-2

## 2020-08-01 NOTE — SUBJECTIVE & OBJECTIVE
Obstetric HPI:  Patient reports None contractions, active fetal movement, absent vaginal bleeding , small loss of fluid      Objective:     Vital Signs (Most Recent):  Temp: 98.2 °F (36.8 °C) (08/01/20 0023)  Pulse: 93 (08/01/20 0023)  Resp: 16 (08/01/20 0023)  BP: (!) 93/54 (08/01/20 0023)  SpO2: (!) 94 % (08/01/20 0023) Vital Signs (24h Range):  Temp:  [97.8 °F (36.6 °C)-98.9 °F (37.2 °C)] 98.2 °F (36.8 °C)  Pulse:  [] 93  Resp:  [16] 16  SpO2:  [94 %-100 %] 94 %  BP: ()/(54-57) 93/54     Weight: 50.8 kg (112 lb)  Body mass index is 21.87 kg/m².    NST reactive, reassuring overnight; See note      Intake/Output Summary (Last 24 hours) at 8/1/2020 0555  Last data filed at 7/31/2020 1617  Gross per 24 hour   Intake 381.67 ml   Output --   Net 381.67 ml     Significant Labs:  Recent Lab Results       07/31/20  2111   07/31/20  1328   07/31/20  0843        Aniso     Slight     BANDS     3.0     Basophil%     0.0     Differential Method     Manual     Eosinophil%     2.0     Glucose, Fasting     90     Gran%     71.0     Hematocrit     23.5     Hemoglobin     7.4     Hypo     Occasional     Immature Grans (Abs)     CANCELED  Comment:  Mild elevation in immature granulocytes is non specific and   can be seen in a variety of conditions including stress response,   acute inflammation, trauma and pregnancy. Correlation with other   laboratory and clinical findings is essential.    Result canceled by the ancillary.       Immature Granulocytes     CANCELED  Comment:  Result canceled by the ancillary.     Lymph%     19.0     MCH     21.8     MCHC     31.5     MCV     69     Metamyelocytes     2.0     Mono%     2.0     MPV     10.8     Myelocytes     1.0     nRBC     0     Platelet Estimate     Appears normal     Platelets     221     POCT Glucose 118 124       RBC     3.39     RDW     17.2     WBC     13.87           Physical Exam:   Constitutional: She is oriented to person, place, and time. She appears  well-developed and well-nourished. No distress.    HENT:   Head: Normocephalic and atraumatic.    Eyes: Pupils are equal, round, and reactive to light. EOM are normal.    Neck: Normal range of motion.    Cardiovascular: Normal rate and regular rhythm.     Pulmonary/Chest: Effort normal.   Normal work of breathing          Abdominal: Soft. She exhibits no distension. There is no abdominal tenderness. There is no rebound and no guarding.   Gravid uterus, appropriate for gestational age/twin pregnancy             Musculoskeletal: Normal range of motion. No tenderness or edema.       Neurological: She is alert and oriented to person, place, and time.    Skin: Skin is warm and dry.    Psychiatric: She has a normal mood and affect.

## 2020-08-02 LAB
CREAT SERPL-MCNC: 0.6 MG/DL (ref 0.5–1.4)
EST. GFR  (AFRICAN AMERICAN): >60 ML/MIN/1.73 M^2
EST. GFR  (NON AFRICAN AMERICAN): >60 ML/MIN/1.73 M^2
GLUCOSE SERPL-MCNC: 111 MG/DL (ref 70–110)
GLUCOSE SERPL-MCNC: 140 MG/DL (ref 70–110)
GLUCOSE SERPL-MCNC: 96 MG/DL (ref 70–110)
GLUCOSE SERPL-MCNC: 99 MG/DL (ref 70–110)

## 2020-08-02 PROCEDURE — 59025 PR FETAL 2N-STRESS TEST: ICD-10-PCS | Mod: 26,59,, | Performed by: OBSTETRICS & GYNECOLOGY

## 2020-08-02 PROCEDURE — 25000003 PHARM REV CODE 250: Performed by: STUDENT IN AN ORGANIZED HEALTH CARE EDUCATION/TRAINING PROGRAM

## 2020-08-02 PROCEDURE — 59025 FETAL NON-STRESS TEST: CPT | Mod: 26,,, | Performed by: OBSTETRICS & GYNECOLOGY

## 2020-08-02 PROCEDURE — 82947 ASSAY GLUCOSE BLOOD QUANT: CPT

## 2020-08-02 PROCEDURE — 11000001 HC ACUTE MED/SURG PRIVATE ROOM

## 2020-08-02 PROCEDURE — 82565 ASSAY OF CREATININE: CPT

## 2020-08-02 PROCEDURE — 99231 SBSQ HOSP IP/OBS SF/LOW 25: CPT | Mod: 25,,, | Performed by: OBSTETRICS & GYNECOLOGY

## 2020-08-02 PROCEDURE — 99231 PR SUBSEQUENT HOSPITAL CARE,LEVL I: ICD-10-PCS | Mod: 25,,, | Performed by: OBSTETRICS & GYNECOLOGY

## 2020-08-02 RX ADMIN — MINERAL SUPPLEMENT IRON 300 MG / 5 ML STRENGTH LIQUID 100 PER BOX UNFLAVORED 300 MG: at 08:08

## 2020-08-02 RX ADMIN — AMOXICILLIN 500 MG: 400 POWDER, FOR SUSPENSION ORAL at 01:08

## 2020-08-02 NOTE — PROGRESS NOTES
MD to bedside as patient complained of some vaginal spotting after using the restroom. No further vaginal bleeding per patient. Did not saturate pad, just spotting when wiping. Feeling fetal movement x 2, no contractions. Occasional LOF per patient.     SSE: no vaginal bleeding noted. No bleeding from cervix. Appears visually 1-2cm dilated, 50% effaced, no BBOW.     A/P:  Will place on monitor for continuous monitoring. NPO currently. Discussed with MFM fellow.    Anh Mlaagon M.D.  Obstetrics and Gynecology   PGY-4

## 2020-08-02 NOTE — SUBJECTIVE & OBJECTIVE
Obstetric HPI:  Patient reports None contractions, active fetal movement, absent vaginal bleeding , small loss of fluid. No further vaginal bleeding, spotting from overnight.      Objective:     Vital Signs (Most Recent):  Temp: 97.5 °F (36.4 °C) (08/02/20 0351)  Pulse: 84 (08/02/20 0354)  Resp: 16 (08/02/20 0351)  BP: (!) 90/51 (08/02/20 0351)  SpO2: 98 % (08/02/20 0354) Vital Signs (24h Range):  Temp:  [96.6 °F (35.9 °C)-98.2 °F (36.8 °C)] 97.5 °F (36.4 °C)  Pulse:  [] 84  Resp:  [16-18] 16  SpO2:  [96 %-100 %] 98 %  BP: ()/(51-62) 90/51     Weight: 47.3 kg (104 lb 4.4 oz)  Body mass index is 20.37 kg/m².    NST reactive, reassuring overnight; See note    No intake or output data in the 24 hours ending 08/02/20 0552  Significant Labs:  Recent Lab Results       08/02/20  0448   08/01/20  1951   08/01/20  1706   08/01/20  1150   08/01/20  0715        Aniso         Slight     BANDS         2.0     Basophil%         1.0     Creatinine 0.6             Differential Method         Manual     eGFR if  >60             eGFR if non  >60  Comment:  Calculation used to obtain the estimated glomerular filtration  rate (eGFR) is the CKD-EPI equation.                Eosinophil%         2.0     Glucose, Fasting 96       98     Gran%         62.0     Group & Rh         B POS     Hematocrit         27.3     Hemoglobin         8.7     Hypo         Occasional     Immature Grans (Abs)         CANCELED  Comment:  Mild elevation in immature granulocytes is non specific and   can be seen in a variety of conditions including stress response,   acute inflammation, trauma and pregnancy. Correlation with other   laboratory and clinical findings is essential.    Result canceled by the ancillary.       Immature Granulocytes         CANCELED  Comment:  Result canceled by the ancillary.     INDIRECT DHAVAL         NEG     Lymph%         22.0     MCH         22.7     MCHC         31.9     MCV         71      Metamyelocytes         3.0     Mono%         5.0     MPV         10.0     Myelocytes         3.0     nRBC         0     Platelet Estimate         Appears normal     Platelets         223     POCT Glucose   123 88 100       Poly         Occasional     RBC         3.83     RDW         18.8     WBC         14.64                          Physical Exam:   Constitutional: She is oriented to person, place, and time. She appears well-developed and well-nourished. No distress.    HENT:   Head: Normocephalic and atraumatic.    Eyes: Pupils are equal, round, and reactive to light. EOM are normal.    Neck: Normal range of motion.    Cardiovascular: Normal rate and regular rhythm.     Pulmonary/Chest: Effort normal.   Normal work of breathing          Abdominal: Soft. She exhibits no distension. There is no abdominal tenderness. There is no rebound and no guarding.   Gravid uterus, appropriate for gestational age/twin pregnancy             Musculoskeletal: Normal range of motion. No tenderness or edema.       Neurological: She is alert and oriented to person, place, and time.    Skin: Skin is warm and dry.    Psychiatric: She has a normal mood and affect.

## 2020-08-02 NOTE — NURSING
Results for PRANEETH PRECIADO (MRN [id number]) as of 8/1/2020 19:54   Ref. Range 8/1/2020 19:51   POCT Glucose Latest Ref Range: 70 - 110 mg/dL 123 (H)     MD Jason notified of PP dinner BG. No new orders, VRB. Will continue to monitor.

## 2020-08-02 NOTE — PROGRESS NOTES
PM NST    FHT A: 150 bpm, moderate variability, +accels, no decels  FHT B: 140 bpm, moderate variability, +accels, no decels  [place]: No contractions      -- Reactive and reassuring x2  -- Continue current plan of care      Mercedes Reyes MD  OBGYN PGY-3

## 2020-08-02 NOTE — NURSING
Pt reports spotting upon wiping after getting up to the bathroom. Pt reports +FMx2, denies ctxs, LOF remains unchanged. MD Manas at bedside to evaluate. No vaginal bleeding noted upon sterile spec exam. Will place pt on continuous monitoring and NPO, VRB. No acute distress noted, will continue to monitor.

## 2020-08-02 NOTE — PLAN OF CARE
Pt doing well.  VS stable. No acute changes. Pt denies vaginal bleeding, and contractions.  Pt reports positive fetal movement x2. Plan of care reviewed and all questions answered, no questions or complaints at this time.

## 2020-08-02 NOTE — PROGRESS NOTES
[facility]  Obstetrics  Antepartum Progress Note    Patient Name: Fany May  MRN: [id number]  Admission Date: 2020  Hospital Length of Stay: 10 days  Attending Physician: Marcos Hudson MD  Primary Care Provider: Primary Doctor No    Subjective:     Principal Problem: premature rupture of membranes (PPROM) with unknown onset of labor    HPI:  Fany May is a 25 y.o. [id number] at 31w4d with di-di twin pregnancy who presents from [facility] clinic for concern for rupture of membranes. Patient endorses loss of fluid since , when she presented to the [facility] and had a negative rupture exam, bedside ultrasound demonstrated membrane with fluid pockets on both sides. She also endorses contractions that have increased in frequency and intensity. Denies vaginal bleeding,   Fetal Movement: normal.     MFM: BPP of 8/8 for A and B. Multiple sites of sac separation between twin A and twin B were found on MFM ultrasound concerning for chorioamniotic membrane separation and PPROM.     With MFM ultrasound and given patient's self described concerns about PPROM, this fits known data   concerning pPROM of one or both sacs confined by membranes.     Pregnancy also complicated by FGR 8th percentile A , B 11%.        Hospital Course:  2020 Overnight patient endorsed increased contractions with pain increasing from 3 to 5. Patient cervix was re-checked and found to be unchanged. Since then, the patient's contractions have spaced out to q10-20 minutes. Patient is still feeling them and rating them at 5/10 pain. Continues to endorse trickling of fluid. Denies vaginal bleeding. Endorses fetal movement.  2020 Called to patient's room once overnight. Placed on FHT for 1 hour, no contractions noted on monitor. Called out again for a different constant abdominal pain. SSE performed, cervix visually unchanged 1-2 cm, white discharge noted, but no vaginal pooling or fluid noted. Pain relieved by  "tylenol. Continues to endorse trickling of fluid. Denies vaginal bleeding. Endorses fetal movement.  07/26/2020 NSTs reassuring yesterday; patient denies any pain and did not experience any pain yesterday; endorses fetal movement; no bleeding; no contractions  07/27/2020 NST reassuring yesterday. Pt reports continued minimal leakage of clear fluid, worse when standing or with fetal movement. She complains of burning epigastric pain, worse when laying down. No vaginal bleeding, no contractions.  07/28/2020 NST reassuring yesterday. Patient reports continued minimal leakage of clear fluid, worse when standing & ambulating. Epigastric pain has improved with PPI. Feeling abdominal "tightening" 2 times per day, no regular contractions, no vaginal bleeding, endorses regular fetal movement. Pt complains of occasional mild HA, improves spontaneously. Denies visual changes, CP, SOB, RUQ pain.  07/29/2020 NST reassuring yesterday. Patient feels leakage of fluid has improved. Epigastric pain and HA have resolved. Still feeling abdominal tightening and cramping intermittently, unable to specify frequency, was able to sleep through the night. One episode of increased pelvic pressure at 2000, spec exam was unchanged 2/60/-3. Patient feeling very down about staying in the hospital for an extended period.   07/30/2020 NST reassuring yesterday. Patient feels leakage of fluid continues to decrease but still present when standing. Complains of intermittent pelvic pressure, worsening over past 12 hours. Patient denying symptoms of depression and declines SSRI at this time.  07/31/2020 Patient felt increasing pelvic pressure last night & placed on monitor, FHT was reactive & reassuring, TOCO with ctx [id number]. No abdominal pain/pressure at present. Patient still endorsing mild leakage of fluid. Still having epigastric burning with PO intake, unable to tolerate liquid famotidine but unwilling to try to swallow pills at this " time.  08/01/2020: NST reactive, reassuring. Continues to endorse feeling LOF at times. Transfused 1u pRBC for anemia  08/02/2020 NSTreactive reassuring x 2 overnight. Had one episode of vaginal spotting yesterday. Negative speculum exam. Continuous monitoring reassuring.     Obstetric HPI:  Patient reports None contractions, active fetal movement, absent vaginal bleeding , small loss of fluid. No further vaginal bleeding, spotting from overnight.      Objective:     Vital Signs (Most Recent):  Temp: 97.5 °F (36.4 °C) (08/02/20 0351)  Pulse: 84 (08/02/20 0354)  Resp: 16 (08/02/20 0351)  BP: (!) 90/51 (08/02/20 0351)  SpO2: 98 % (08/02/20 0354) Vital Signs (24h Range):  Temp:  [96.6 °F (35.9 °C)-98.2 °F (36.8 °C)] 97.5 °F (36.4 °C)  Pulse:  [] 84  Resp:  [16-18] 16  SpO2:  [96 %-100 %] 98 %  BP: ()/(51-62) 90/51     Weight: 47.3 kg (104 lb 4.4 oz)  Body mass index is 20.37 kg/m².    NST reactive, reassuring overnight; See note    No intake or output data in the 24 hours ending 08/02/20 0552  Significant Labs:  Recent Lab Results       08/02/20  0448   08/01/20  1951   08/01/20  1706   08/01/20  1150   08/01/20  0715        Aniso         Slight     BANDS         2.0     Basophil%         1.0     Creatinine 0.6             Differential Method         Manual     eGFR if  >60             eGFR if non  >60  Comment:  Calculation used to obtain the estimated glomerular filtration  rate (eGFR) is the CKD-EPI equation.                Eosinophil%         2.0     Glucose, Fasting 96       98     Gran%         62.0     Group & Rh         B POS     Hematocrit         27.3     Hemoglobin         8.7     Hypo         Occasional     Immature Grans (Abs)         CANCELED  Comment:  Mild elevation in immature granulocytes is non specific and   can be seen in a variety of conditions including stress response,   acute inflammation, trauma and pregnancy. Correlation with other   laboratory and  clinical findings is essential.    Result canceled by the ancillary.       Immature Granulocytes         CANCELED  Comment:  Result canceled by the ancillary.     INDIRECT DHAVAL         NEG     Lymph%         22.0     MCH         22.7     MCHC         31.9     MCV         71     Metamyelocytes         3.0     Mono%         5.0     MPV         10.0     Myelocytes         3.0     nRBC         0     Platelet Estimate         Appears normal     Platelets         223     POCT Glucose   123 88 100       Poly         Occasional     RBC         3.83     RDW         18.8     WBC         14.64                          Physical Exam:   Constitutional: She is oriented to person, place, and time. She appears well-developed and well-nourished. No distress.    HENT:   Head: Normocephalic and atraumatic.    Eyes: Pupils are equal, round, and reactive to light. EOM are normal.    Neck: Normal range of motion.    Cardiovascular: Normal rate and regular rhythm.     Pulmonary/Chest: Effort normal.   Normal work of breathing          Abdominal: Soft. She exhibits no distension. There is no abdominal tenderness. There is no rebound and no guarding.   Gravid uterus, appropriate for gestational age/twin pregnancy             Musculoskeletal: Normal range of motion. No tenderness or edema.       Neurological: She is alert and oriented to person, place, and time.    Skin: Skin is warm and dry.    Psychiatric: She has a normal mood and affect.       Assessment/Plan:     25 y.o. female  at 33w0d for:    *  premature rupture of membranes (PPROM) with unknown onset of labor  -: presented to the [facility] with concern for gush of fluid, negative rupture exam, bedside ultrasound demonstrated two pockets of fluid across the visible membrane in both A and B MVP > 2  -: presented to [facility] appointment complaining of continued trickling since  clear fluid  -: [facility] ultrasound demonstrated multiple sites of sac separation in this  US, concerning for CMS (chorioamniotic membrane separation). Given patient's self described concerns about PPROM (see note from OB ED on ), this fits known data concerning PPROM of one or both sacs confined by membranes  -: In [facility], rupture exam negative, bedside ultrasound revealed same pocket of fluid between the two amniotic membranes  -discussed with MFM, decision to admit to antepartum  - : MVP normal on limited ultrasound  - : feeling increased painful ctx, [id number] on TOCO, now resolved  PPROM at 31w4d  -  one episode of vaginal spotting. Speculum exam negative for any vaginal bleeding or signs of abruption on exam.     - now on D#11 of PPROM antibiotics    - continue to monitor closely for signs of infection     31 weeks gestation of pregnancy  - Consents for Delivery and Blood signed  - BMZ course complete -  - GBS neg  - Monitor fetal status closely    - fetal position confirmed as cephalic/breech   - normal MVP on limited ultrasound   - FHT reactive and reassuring, TOCO ctx [id number] with baseline uterine irritability  - Last US on : EFW was A: 1340 (8th percent), B 1427 g (11%)  - As EFW now likely > 1500 and given GA, DARRELL may be considered if OB provider in house is comfortable with breech extraction, risks discussed with patient, if not, would be for  at 34w  - Diet Regular, NST BID      Iron deficiency anemia  - H/H on admit .7  - Iron studies:   - Iron 19   - TIBC 670   - Saturated iron 3   - Transferrin 453   - Ferritin 8  - On iron BID and scheduled colace  -  H/H . Transfused 1u pRBCs>     Desires immediate post-placental Mirena  - Medicaid  - order placed, will put at bedside  - will sign consents     Anh Malagon MD  Obstetrics  [facility]      --------------------------------------------------------  Patient seen and examined.  Resting comfortably. No episodes of vaginal bleeding. Denies shortness of breath  and dizziness.    Temp:  [96.6 °F (35.9 °C)-98.2 °F (36.8 °C)] 98.2 °F (36.8 °C)  Pulse:  [] 93  Resp:  [15-18] 15  SpO2:  [96 %-100 %] 99 %  BP: ()/(51-62) 103/57    AM NST 8/2: Twin A: 125 bpm/mod variability/2+ accels/no decels                       Twin B: 135 bpm/mod varibility/2+ accels/no decels            [place]: 1/20 minutes      s/p Latency antibiotics  Will continue to monitor with plan for delivery at 34 weeks.     Katya Diamond MD  PGY 5  Maternal Fetal Medicine  [facility]

## 2020-08-03 PROBLEM — R73.09 ELEVATED GLUCOSE TOLERANCE TEST: Status: ACTIVE | Noted: 2020-08-03

## 2020-08-03 LAB
GLUCOSE SERPL-MCNC: 99 MG/DL (ref 70–110)
POCT GLUCOSE: 111 MG/DL (ref 70–110)
POCT GLUCOSE: 116 MG/DL (ref 70–110)
POCT GLUCOSE: 140 MG/DL (ref 70–110)
POCT GLUCOSE: 91 MG/DL (ref 70–110)
POCT GLUCOSE: 93 MG/DL (ref 70–110)
POCT GLUCOSE: 93 MG/DL (ref 70–110)

## 2020-08-03 PROCEDURE — 59025 PR FETAL 2N-STRESS TEST: ICD-10-PCS | Mod: 26,59,, | Performed by: OBSTETRICS & GYNECOLOGY

## 2020-08-03 PROCEDURE — 99232 PR SUBSEQUENT HOSPITAL CARE,LEVL II: ICD-10-PCS | Mod: 25,,, | Performed by: OBSTETRICS & GYNECOLOGY

## 2020-08-03 PROCEDURE — 36415 COLL VENOUS BLD VENIPUNCTURE: CPT

## 2020-08-03 PROCEDURE — 25000003 PHARM REV CODE 250: Performed by: STUDENT IN AN ORGANIZED HEALTH CARE EDUCATION/TRAINING PROGRAM

## 2020-08-03 PROCEDURE — 11000001 HC ACUTE MED/SURG PRIVATE ROOM

## 2020-08-03 PROCEDURE — 82947 ASSAY GLUCOSE BLOOD QUANT: CPT

## 2020-08-03 PROCEDURE — 99232 SBSQ HOSP IP/OBS MODERATE 35: CPT | Mod: 25,,, | Performed by: OBSTETRICS & GYNECOLOGY

## 2020-08-03 PROCEDURE — 59025 FETAL NON-STRESS TEST: CPT | Mod: 26,,, | Performed by: OBSTETRICS & GYNECOLOGY

## 2020-08-03 RX ADMIN — MINERAL SUPPLEMENT IRON 300 MG / 5 ML STRENGTH LIQUID 100 PER BOX UNFLAVORED 300 MG: at 09:08

## 2020-08-03 NOTE — ASSESSMENT & PLAN NOTE
-Failed one hour glucose screen  -Pt had BS trended for 7d; <50%   -Does not meet criteria for GDM

## 2020-08-03 NOTE — NURSING
Upper level resident called about Pt post prandial blood sugar. . Waiting for call back for further instructions. Pt stable. WCTM.

## 2020-08-03 NOTE — NURSING
Report received. Pt lying in bed. Pt denies any pain at this time. VSS. Afebrile. +FM for baby A and baby B. FHTs assessed. See flowsheet. Pt denies any further leaking of vaginal fluids, vaginal bleeding, or contractions. Bed in lowest position, call light within reach. WCTM.

## 2020-08-03 NOTE — ASSESSMENT & PLAN NOTE
-7/16: presented to the [facility] with concern for gush of fluid, negative rupture exam, bedside ultrasound demonstrated two pockets of fluid across the visible membrane in both A and B MVP > 2  -7/23: presented to MFM appointment complaining of continued trickling since 7/16 clear fluid  -7/23: MFM ultrasound demonstrated multiple sites of sac separation in this US, concerning for CMS (chorioamniotic membrane separation). Given patient's self described concerns about PPROM (see note from OB ED on 07/16), this fits known data concerning PPROM of one or both sacs confined by membranes  -7/23: In [facility], rupture exam negative, bedside ultrasound revealed same pocket of fluid between the two amniotic membranes  -discussed with MFM, decision to admit to antepartum  - 7/27: MVP normal on limited ultrasound  - now s/p 10 days latency antibiotics  - continue to monitor closely for signs of infection   - Expectant management until 34 wga

## 2020-08-03 NOTE — ASSESSMENT & PLAN NOTE
- Consents for Delivery and Blood signed  - BMZ course complete -  - GBS neg  - Monitor fetal status closely    - fetal position confirmed as cephalic/breech   - normal MVP on limited ultrasound   - FHT reactive and reassuring, TOCO ctx [id number] with baseline uterine irritability  - Last US on : EFW was A: 1340 (8th percent), B 1427 g (11%)  - As EFW now likely > 1500 and given GA, DARRELL may be considered if OB provider in house is comfortable with breech extraction, risks discussed with patient, if not, would be for  at 34w  - Diet Regular, NST BID

## 2020-08-03 NOTE — PLAN OF CARE
08/03/20 1551   Discharge Assessment   Assessment Type Discharge Planning Reassessment   Discharge Plan A Home     Met with pt today for weekly f/u. Pt had no concerns or complaints at this time.Pt is excited about upcoming delivery. She reported she is trying to maintain a positive attitude as she nears delivery. Pt is missing her 1 y/o but knows she will be able to see her soon. Re-visited NICU unit information. All questions regarding NICU admission answered up to this point.     SW remains avail for emotional support.     No further f/u needed.     Shy Lomas LCSW    [facility]  [email or web address]    (phone) [phone] or  Lze. [phone]  (fax) [phone]

## 2020-08-03 NOTE — PLAN OF CARE
Recommendations  1. Continue current Regular diet.   2. If intake <50% offer Boost Plus.  Goals: Pt to consume >75% of meals by RD follow up.  Nutrition Goal Status: new

## 2020-08-03 NOTE — ASSESSMENT & PLAN NOTE
- H/H on admit 8.2/26.7  - Iron studies confirmed Fe def anemia  - 7/31 H/H 7.4/23. Transfused 1u pRBCs.  - Appropriate rise in H/H. Asymptomatic

## 2020-08-03 NOTE — PROGRESS NOTES
[facility]  Adult Nutrition  Progress Note    SUMMARY       Recommendations  1. Continue current Regular diet.   2. If intake <50% offer Boost Plus.  Goals: Pt to consume >75% of meals by RD follow up.  Nutrition Goal Status: new  Communication of RD Recs: (plan of care)    Reason for Assessment  Reason For Assessment: consult, length of stay  Relevant Medical History: sickle cell trait  General Information Comments: Pt admitted to hospital with  premature rupture of membranes. Currently on a Regular diet consuming an average of %. Chart shows 7% wt loss of 8lb in past month. NFPE unable to be completed 2/2 RD assessing remotely.  Nutrition Discharge Planning: Adequate PO intake via Regular diet.    Nutrition Risk Screen  Nutrition Risk Screen: no indicators present    Nutrition/Diet History  Spiritual, Cultural Beliefs, Hinduism Practices, Values that Affect Care: no  Factors Affecting Nutritional Intake: nausea/vomiting, decreased appetite    Anthropometrics  Temp: 98.2 °F (36.8 °C)  Height Method: Stated  Height: 5' (152.4 cm)  Height (inches): 60 in  Weight Method: Standard Scale  Weight: 47.3 kg (104 lb 4.4 oz)  Weight (lb): 104.28 lb  Ideal Body Weight (IBW), Female: 100 lb  % Ideal Body Weight, Female (lb): 104.28 %  BMI (Calculated): 20.4  BMI Grade: 18.5-24.9 - normal     Lab/Procedures/Meds  Pertinent Labs Comments: BUN 4, Idris 8.4, Alb 2.6  Pertinent Medications Comments: Prenatal vitamin, famotidine    Physical Findings/Assessment  Nik score 23     Estimated/Assessed Needs  Weight Used For Calorie Calculations: 47.3 kg (104 lb 4.4 oz)  Energy Calorie Requirements (kcal): 9209-5826 kcal daily  Energy Need Method: [place]-St Jeor(+ 340-450 kcal)  Protein Requirements: 52 gm daily  Weight Used For Protein Calculations: 47.3 kg (104 lb 4.4 oz)(1.1 gm/kg)  Fluid Requirements (mL): 1 mL/kcal or per MD  Estimated Fluid Requirement Method: RDA Method  RDA Method (mL): 1484      Nutrition Prescription Ordered  Current Diet Order: Regular    Evaluation of Received Nutrient/Fluid Intake  % Intake of Estimated Energy Needs: 75 - 100 %  % Meal Intake: 75 - 100 %    Nutrition Risk  Level of Risk/Frequency of Follow-up: low(f/u 1x/weekly)     Assessment and Plan  Nutrition Problem  Unintentional wt loss    Related to (etiology):   Nausea & loss of appetite    Signs and Symptoms (as evidenced by):    7% wt loss of 8lb in past month    Interventions (treatment strategy):  Collaboration with other providers    Nutrition Diagnosis Status:   New    Monitor and Evaluation  Food and Nutrient Intake: energy intake  Food and Nutrient Adminstration: diet order  Knowledge/Beliefs/Attitudes: food and nutrition knowledge/skill  Anthropometric Measurements: weight, weight change  Biochemical Data, Medical Tests and Procedures: electrolyte and renal panel, gastrointestinal profile, glucose/endocrine profile, inflammatory profile, lipid profile  Nutrition-Focused Physical Findings: overall appearance     Nutrition Follow-Up  RD Follow-up?: Yes

## 2020-08-03 NOTE — SUBJECTIVE & OBJECTIVE
Obstetric HPI:  Patient reports None contractions, active fetal movement, absent vaginal bleeding, small loss of fluid. No further vaginal bleeding, spotting from overnight.      Objective:     Vital Signs (Most Recent):  Temp: 98.4 °F (36.9 °C) (08/03/20 0356)  Pulse: 95 (08/03/20 0357)  Resp: 17 (08/03/20 0357)  BP: (!) 101/54 (08/03/20 0357)  SpO2: 100 % (08/03/20 0357) Vital Signs (24h Range):  Temp:  [98.4 °F (36.9 °C)-99.3 °F (37.4 °C)] 98.4 °F (36.9 °C)  Pulse:  [92-98] 95  Resp:  [14-18] 17  SpO2:  [98 %-100 %] 100 %  BP: ()/(53-58) 101/54     Weight: 47.3 kg (104 lb 4.4 oz)  Body mass index is 20.37 kg/m².    NST reactive, reassuring overnight; See note    No intake or output data in the 24 hours ending 08/03/20 0936  Significant Labs:  Recent Lab Results       08/03/20  0537   08/02/20  2045   08/02/20  2008   08/02/20  1700   08/02/20  1650        Glucose, Fasting 99             POC Glucose   140   99  Comment:  2 hour post prandial       POCT Glucose     140   91                      08/02/20  1230   08/02/20  1222        Glucose, Fasting         POC Glucose 111  Comment:  2 hour post prandial       POCT Glucose   111           Physical Exam:   Constitutional: She is oriented to person, place, and time. She appears well-developed and well-nourished. No distress.    HENT:   Head: Normocephalic and atraumatic.    Eyes: Pupils are equal, round, and reactive to light. EOM are normal.    Neck: Normal range of motion.    Cardiovascular: Normal rate and regular rhythm.     Pulmonary/Chest: Effort normal.   Normal work of breathing          Abdominal: Soft. She exhibits no distension. There is no abdominal tenderness. There is no rebound and no guarding.   Gravid uterus, appropriate for gestational age/twin pregnancy             Musculoskeletal: Normal range of motion. No tenderness or edema.       Neurological: She is alert and oriented to person, place, and time.    Skin: Skin is warm and dry.     Psychiatric: She has a normal mood and affect.

## 2020-08-03 NOTE — PROGRESS NOTES
[facility]  Obstetrics  Antepartum Progress Note    Patient Name: Fany May  MRN: [id number]  Admission Date: 2020  Hospital Length of Stay: 11 days  Attending Physician: Marcos Hudson MD  Primary Care Provider: Primary Doctor No    Subjective:     Principal Problem: premature rupture of membranes (PPROM) with unknown onset of labor    HPI:  Fany May is a 25 y.o. [id number] at 31w4d with di-di twin pregnancy who presents from [facility] clinic for concern for rupture of membranes. Patient endorses loss of fluid since , when she presented to the [facility] and had a negative rupture exam, bedside ultrasound demonstrated membrane with fluid pockets on both sides. She also endorses contractions that have increased in frequency and intensity. Denies vaginal bleeding,   Fetal Movement: normal.     MFM: BPP of 8/8 for A and B. Multiple sites of sac separation between twin A and twin B were found on MFM ultrasound concerning for chorioamniotic membrane separation and PPROM.     With MFM ultrasound and given patient's self described concerns about PPROM, this fits known data   concerning pPROM of one or both sacs confined by membranes.     Pregnancy also complicated by FGR 8th percentile A , B 11%.        Hospital Course:  2020 Overnight patient endorsed increased contractions with pain increasing from 3 to 5. Patient cervix was re-checked and found to be unchanged. Since then, the patient's contractions have spaced out to q10-20 minutes. Patient is still feeling them and rating them at 5/10 pain. Continues to endorse trickling of fluid. Denies vaginal bleeding. Endorses fetal movement.  2020 Called to patient's room once overnight. Placed on FHT for 1 hour, no contractions noted on monitor. Called out again for a different constant abdominal pain. SSE performed, cervix visually unchanged 1-2 cm, white discharge noted, but no vaginal pooling or fluid noted. Pain relieved by  "tylenol. Continues to endorse trickling of fluid. Denies vaginal bleeding. Endorses fetal movement.  07/26/2020 NSTs reassuring yesterday; patient denies any pain and did not experience any pain yesterday; endorses fetal movement; no bleeding; no contractions  07/27/2020 NST reassuring yesterday. Pt reports continued minimal leakage of clear fluid, worse when standing or with fetal movement. She complains of burning epigastric pain, worse when laying down. No vaginal bleeding, no contractions.  07/28/2020 NST reassuring yesterday. Patient reports continued minimal leakage of clear fluid, worse when standing & ambulating. Epigastric pain has improved with PPI. Feeling abdominal "tightening" 2 times per day, no regular contractions, no vaginal bleeding, endorses regular fetal movement. Pt complains of occasional mild HA, improves spontaneously. Denies visual changes, CP, SOB, RUQ pain.  07/29/2020 NST reassuring yesterday. Patient feels leakage of fluid has improved. Epigastric pain and HA have resolved. Still feeling abdominal tightening and cramping intermittently, unable to specify frequency, was able to sleep through the night. One episode of increased pelvic pressure at 2000, spec exam was unchanged 2/60/-3. Patient feeling very down about staying in the hospital for an extended period.   07/30/2020 NST reassuring yesterday. Patient feels leakage of fluid continues to decrease but still present when standing. Complains of intermittent pelvic pressure, worsening over past 12 hours. Patient denying symptoms of depression and declines SSRI at this time.  07/31/2020 Patient felt increasing pelvic pressure last night & placed on monitor, FHT was reactive & reassuring, TOCO with ctx [id number]. No abdominal pain/pressure at present. Patient still endorsing mild leakage of fluid. Still having epigastric burning with PO intake, unable to tolerate liquid famotidine but unwilling to try to swallow pills at this " time.  08/01/2020: NST reactive, reassuring. Continues to endorse feeling LOF at times. Transfused 1u pRBC for anemia  08/02/2020 NSTreactive reassuring x 2 overnight. Had one episode of vaginal spotting yesterday. Negative speculum exam. Continuous monitoring reassuring.   08/03/2020: NST r/r. No complaints. Continue course    Obstetric HPI:  Patient reports None contractions, active fetal movement, absent vaginal bleeding, small loss of fluid. No further vaginal bleeding, spotting from overnight.      Objective:     Vital Signs (Most Recent):  Temp: 98.4 °F (36.9 °C) (08/03/20 0356)  Pulse: 95 (08/03/20 0357)  Resp: 17 (08/03/20 0357)  BP: (!) 101/54 (08/03/20 0357)  SpO2: 100 % (08/03/20 0357) Vital Signs (24h Range):  Temp:  [98.4 °F (36.9 °C)-99.3 °F (37.4 °C)] 98.4 °F (36.9 °C)  Pulse:  [92-98] 95  Resp:  [14-18] 17  SpO2:  [98 %-100 %] 100 %  BP: ()/(53-58) 101/54     Weight: 47.3 kg (104 lb 4.4 oz)  Body mass index is 20.37 kg/m².    NST reactive, reassuring overnight; See note    No intake or output data in the 24 hours ending 08/03/20 0936  Significant Labs:  Recent Lab Results       08/03/20  0537   08/02/20  2045   08/02/20 2008 08/02/20  1700   08/02/20  1650        Glucose, Fasting 99             POC Glucose   140   99  Comment:  2 hour post prandial       POCT Glucose     140   91                      08/02/20  1230   08/02/20  1222        Glucose, Fasting         POC Glucose 111  Comment:  2 hour post prandial       POCT Glucose   111           Physical Exam:   Constitutional: She is oriented to person, place, and time. She appears well-developed and well-nourished. No distress.    HENT:   Head: Normocephalic and atraumatic.    Eyes: Pupils are equal, round, and reactive to light. EOM are normal.    Neck: Normal range of motion.    Cardiovascular: Normal rate and regular rhythm.     Pulmonary/Chest: Effort normal.   Normal work of breathing          Abdominal: Soft. She exhibits no  distension. There is no abdominal tenderness. There is no rebound and no guarding.   Gravid uterus, appropriate for gestational age/twin pregnancy             Musculoskeletal: Normal range of motion. No tenderness or edema.       Neurological: She is alert and oriented to person, place, and time.    Skin: Skin is warm and dry.    Psychiatric: She has a normal mood and affect.       Assessment/Plan:     25 y.o. female  at 33w1d for:    *  premature rupture of membranes (PPROM) with unknown onset of labor  -: presented to the [facility] with concern for gush of fluid, negative rupture exam, bedside ultrasound demonstrated two pockets of fluid across the visible membrane in both A and B MVP > 2  -: presented to MFM appointment complaining of continued trickling since  clear fluid  -: MFM ultrasound demonstrated multiple sites of sac separation in this US, concerning for CMS (chorioamniotic membrane separation). Given patient's self described concerns about PPROM (see note from OB ED on ), this fits known data concerning PPROM of one or both sacs confined by membranes  -: In [facility], rupture exam negative, bedside ultrasound revealed same pocket of fluid between the two amniotic membranes  -discussed with MFM, decision to admit to antepartum  - : MVP normal on limited ultrasound  - now s/p 10 days latency antibiotics  - continue to monitor closely for signs of infection   - Expectant management until 34 wga    Elevated glucose tolerance test  -Failed one hour glucose screen  -Pt had BS trended for 7d; <50%   -Does not meet criteria for GDM    31 weeks gestation of pregnancy  - Consents for Delivery and Blood signed  - BMZ course complete -  - GBS neg  - Monitor fetal status closely    - fetal position confirmed as cephalic/breech   - normal MVP on limited ultrasound   - FHT reactive and reassuring, TOCO ctx [id number] with baseline uterine irritability  - Last US on : EFW  was A: 1340 (8th percent), B 1427 g (11%)  - As EFW now likely > 1500 and given GA, DARRELL may be considered if OB provider in house is comfortable with breech extraction, risks discussed with patient, if not, would be for  at 34w  - Diet Regular, NST BID      Iron deficiency anemia  - H/H on admit 8.2/26.7  - Iron studies confirmed Fe def anemia  -  H/H 7.4/23. Transfused 1u pRBCs.  - Appropriate rise in H/H. Asymptomatic    Desires immediate post-placental Mirena  - Medicaid  -will order if patient proceeds with delivery    Sickle cell trait              JANIE Frances MD  OBGYN PGY2       --------------------------------------  Patient seen and examined. No complaints overnight.     Temp:  [98.4 °F (36.9 °C)-99.3 °F (37.4 °C)] 98.4 °F (36.9 °C)  Pulse:  [92-98] 95  Resp:  [14-18] 17  SpO2:  [98 %-100 %] 100 %  BP: ()/(53-58) 101/54      NST: Twin A: 130 bpm/moderate variability/ 2+ accels/ no decels           Twin B: 140 bpm/moderate variability/2+ accels/no decels           [place]: 1/20 minutes    Plan for delivery at 34 weeks unless earlier indication arises    Katya Diamond MD  PGY 5  Maternal Fetal Medicine  [facility]

## 2020-08-04 LAB
ABO + RH BLD: NORMAL
BLD GP AB SCN CELLS X3 SERPL QL: NORMAL
GLUCOSE SERPL-MCNC: 92 MG/DL (ref 70–110)
POCT GLUCOSE: 107 MG/DL (ref 70–110)
POCT GLUCOSE: 113 MG/DL (ref 70–110)
POCT GLUCOSE: 116 MG/DL (ref 70–110)

## 2020-08-04 PROCEDURE — 82947 ASSAY GLUCOSE BLOOD QUANT: CPT

## 2020-08-04 PROCEDURE — 11000001 HC ACUTE MED/SURG PRIVATE ROOM

## 2020-08-04 PROCEDURE — 99232 PR SUBSEQUENT HOSPITAL CARE,LEVL II: ICD-10-PCS | Mod: ,,, | Performed by: OBSTETRICS & GYNECOLOGY

## 2020-08-04 PROCEDURE — 36415 COLL VENOUS BLD VENIPUNCTURE: CPT

## 2020-08-04 PROCEDURE — 25000003 PHARM REV CODE 250: Performed by: STUDENT IN AN ORGANIZED HEALTH CARE EDUCATION/TRAINING PROGRAM

## 2020-08-04 PROCEDURE — 99232 SBSQ HOSP IP/OBS MODERATE 35: CPT | Mod: ,,, | Performed by: OBSTETRICS & GYNECOLOGY

## 2020-08-04 PROCEDURE — 86901 BLOOD TYPING SEROLOGIC RH(D): CPT

## 2020-08-04 RX ADMIN — MINERAL SUPPLEMENT IRON 300 MG / 5 ML STRENGTH LIQUID 100 PER BOX UNFLAVORED 300 MG: at 09:08

## 2020-08-04 NOTE — ASSESSMENT & PLAN NOTE
-7/16: presented to the [facility] with concern for gush of fluid, negative rupture exam, bedside ultrasound demonstrated two pockets of fluid across the visible membrane in both A and B MVP > 2  -7/23: presented to MFM appointment complaining of continued trickling since 7/16 clear fluid  -7/23: MFM ultrasound demonstrated multiple sites of sac separation in this US, concerning for CMS (chorioamniotic membrane separation). Given patient's self described concerns about PPROM (see note from OB ED on 07/16), this fits known data concerning PPROM of one or both sacs confined by membranes In [facility], rupture exam negative, bedside ultrasound revealed same pocket of fluid between the two amniotic membranes  - now s/p 10 days latency antibiotics  - continue to monitor closely for signs of infection   - Expectant management until 34 wga

## 2020-08-04 NOTE — PROGRESS NOTES
AM NST  130/130 baseline/ mod variability/+accels/-decels  Reactive, reassuring  W/o contractions    Repeat in PM     MD BRINA Quinonez PGY2

## 2020-08-04 NOTE — NURSING
Results for PRANEETH PRECIADO (MRN [id number]) as of 8/3/2020 21:38   Ref. Range 8/3/2020 21:34   POCT Glucose Latest Ref Range: 70 - 110 mg/dL 116 (H)   After dinner accucheck.

## 2020-08-04 NOTE — PLAN OF CARE
VSS, pt afebrile. Denies changes in LOF, vag bleeding, ctx. Reports +FM. Reviewed plan of care, pt verbalized understanding and had no questions at this time.

## 2020-08-04 NOTE — SUBJECTIVE & OBJECTIVE
Obstetric HPI:  Patient reports None contractions, active fetal movement, absent vaginal bleeding, small loss of fluid. No further vaginal bleeding, spotting from overnight.      Objective:     Vital Signs (Most Recent):  Temp: 97.9 °F (36.6 °C) (08/04/20 0405)  Pulse: 91 (08/04/20 0405)  Resp: 16 (08/04/20 0405)  BP: (!) 93/50 (08/04/20 0405)  SpO2: 99 % (08/04/20 0404) Vital Signs (24h Range):  Temp:  [97.9 °F (36.6 °C)-98.5 °F (36.9 °C)] 97.9 °F (36.6 °C)  Pulse:  [] 91  Resp:  [16-18] 16  SpO2:  [99 %-100 %] 99 %  BP: ()/(50-58) 93/50     Weight: 47.3 kg (104 lb 4.4 oz)  Body mass index is 20.37 kg/m².    NST reactive, reassuring overnight; See note    No intake or output data in the 24 hours ending 08/04/20 0643  Significant Labs:  Recent Lab Results       08/04/20  0547   08/03/20  2134   08/03/20  1709   08/03/20  1138        Glucose, Fasting 92           Group & Rh B POS           INDIRECT DHAVAL NEG           POCT Glucose   116 93 93           Physical Exam:   Constitutional: She is oriented to person, place, and time. She appears well-developed and well-nourished. No distress.    HENT:   Head: Normocephalic and atraumatic.    Eyes: Pupils are equal, round, and reactive to light. EOM are normal.    Neck: Normal range of motion.    Cardiovascular: Normal rate and regular rhythm.     Pulmonary/Chest: Effort normal.   Normal work of breathing          Abdominal: Soft. She exhibits no distension. There is no abdominal tenderness. There is no rebound and no guarding.   Gravid uterus, appropriate for gestational age/twin pregnancy             Musculoskeletal: Normal range of motion. No tenderness or edema.       Neurological: She is alert and oriented to person, place, and time.    Skin: Skin is warm and dry.    Psychiatric: She has a normal mood and affect.

## 2020-08-04 NOTE — ASSESSMENT & PLAN NOTE
- Consents for Delivery and Blood signed  - BMZ course complete -  - GBS neg  - Monitor fetal status closely    - fetal position confirmed as cephalic/breech   - normal MVP on limited ultrasound   - FHT reactive and reassuring, TOCO ctx [id number] with baseline uterine irritability  - Last US on : EFW was A: 1340 (8th percent), B 1427 g (11%)  - As EFW now likely > 1500 and given GA, DARRELL may be considered if OB provider in house is comfortable with breech extraction, risks discussed with patient, if not, would be for  at 34w  - Diet Regular, NST BID

## 2020-08-05 LAB
POCT GLUCOSE: 106 MG/DL (ref 70–110)
POCT GLUCOSE: 111 MG/DL (ref 70–110)
POCT GLUCOSE: 114 MG/DL (ref 70–110)

## 2020-08-05 PROCEDURE — 25000003 PHARM REV CODE 250: Performed by: STUDENT IN AN ORGANIZED HEALTH CARE EDUCATION/TRAINING PROGRAM

## 2020-08-05 PROCEDURE — 11000001 HC ACUTE MED/SURG PRIVATE ROOM

## 2020-08-05 PROCEDURE — 99232 PR SUBSEQUENT HOSPITAL CARE,LEVL II: ICD-10-PCS | Mod: ,,, | Performed by: OBSTETRICS & GYNECOLOGY

## 2020-08-05 PROCEDURE — 82962 GLUCOSE BLOOD TEST: CPT

## 2020-08-05 PROCEDURE — 59025 FETAL NON-STRESS TEST: CPT

## 2020-08-05 PROCEDURE — 99232 SBSQ HOSP IP/OBS MODERATE 35: CPT | Mod: ,,, | Performed by: OBSTETRICS & GYNECOLOGY

## 2020-08-05 RX ADMIN — MINERAL SUPPLEMENT IRON 300 MG / 5 ML STRENGTH LIQUID 100 PER BOX UNFLAVORED 300 MG: at 09:08

## 2020-08-05 RX ADMIN — MINERAL SUPPLEMENT IRON 300 MG / 5 ML STRENGTH LIQUID 100 PER BOX UNFLAVORED 300 MG: at 11:08

## 2020-08-05 NOTE — ASSESSMENT & PLAN NOTE
- H/H on admit 8.2/26.7  - Iron studies confirmed Fe def anemia  - 7/31 H/H 7.4/23. s/p 1u pRBCs  - Appropriate rise in H/H. Asymptomatic

## 2020-08-05 NOTE — NURSING
Results for OZZY JOSE LUISMC (MRN [id number]) as of 8/5/2020 12:05   Ref. Range 8/5/2020 11:06   POCT Glucose Latest Ref Range: 70 - 110 mg/dL 111 (H)   2 hr post-breakfast

## 2020-08-05 NOTE — PLAN OF CARE
Pt reports no acute changes or complaints throughout night. VS stable and afebrile. Pt reports + FM, denies vaginal bleeding, or contractions. Pt denies headache, blurry vision, or RUQ pain. Plan of care reviewed with patient. All questions and concerns addressed at this time.

## 2020-08-05 NOTE — SUBJECTIVE & OBJECTIVE
Obstetric HPI:  Patient reports None contractions, active fetal movement, absent vaginal bleeding, small loss of fluid. No further vaginal bleeding, spotting from overnight.      Objective:     Vital Signs (Most Recent):  Temp: 97.9 °F (36.6 °C) (08/05/20 0324)  Pulse: 85 (08/05/20 0324)  Resp: 16 (08/05/20 0324)  BP: (!) 96/50 (08/05/20 0324)  SpO2: 98 % (08/05/20 0324) Vital Signs (24h Range):  Temp:  [97.5 °F (36.4 °C)-98.4 °F (36.9 °C)] 97.9 °F (36.6 °C)  Pulse:  [] 85  Resp:  [16-18] 16  SpO2:  [98 %-100 %] 98 %  BP: ()/(50-55) 96/50     Weight: 47.3 kg (104 lb 4.4 oz)  Body mass index is 20.37 kg/m².    NST reactive, reassuring overnight; See note    No intake or output data in the 24 hours ending 08/05/20 0459  Significant Labs:  Recent Lab Results       08/04/20  2047   08/04/20  1615   08/04/20  0932   08/04/20  0547        Glucose, Fasting       92     Group & Rh       B POS     INDIRECT DHAVAL       NEG     POCT Glucose 113 107 116             Physical Exam:   Constitutional: She is oriented to person, place, and time. She appears well-developed and well-nourished. No distress.    HENT:   Head: Normocephalic and atraumatic.    Eyes: Pupils are equal, round, and reactive to light. EOM are normal.    Neck: Normal range of motion.    Cardiovascular: Normal rate and regular rhythm.     Pulmonary/Chest: Effort normal.   Normal work of breathing          Abdominal: Soft. She exhibits no distension. There is no abdominal tenderness. There is no rebound and no guarding.   Gravid uterus, appropriate for gestational age/twin pregnancy             Musculoskeletal: Normal range of motion. No tenderness or edema.       Neurological: She is alert and oriented to person, place, and time.    Skin: Skin is warm and dry.    Psychiatric: She has a normal mood and affect.

## 2020-08-05 NOTE — PLAN OF CARE
Vital signs stable, afebrile, denies pain, cramping, vaginal bleeding/change in vaginal discharge; reports + fetal movement x 2

## 2020-08-05 NOTE — ASSESSMENT & PLAN NOTE
-7/16: presented to the [facility] with concern for gush of fluid, negative rupture exam, bedside ultrasound demonstrated two pockets of fluid across the visible membrane in both A and B MVP > 2  -7/23: presented to MFM appointment complaining of continued trickling since 7/16 clear fluid  -7/23: MFM ultrasound demonstrated multiple sites of sac separation in this US, concerning for CMS (chorioamniotic membrane separation). Given patient's self described concerns about PPROM (see note from OB ED on 07/16), this fits known data concerning PPROM of one or both sacs confined by membranes In [facility], rupture exam negative, bedside ultrasound revealed same pocket of fluid between the two amniotic membranes  - now s/p 10 days latency antibiotics  - continue to monitor closely for signs of infection   - Expectant management until 34 wga  - Daily NST BID continue to be reactive, reassuring

## 2020-08-05 NOTE — PROGRESS NOTES
[facility]  Obstetrics  Antepartum Progress Note    Patient Name: Fany May  MRN: [id number]  Admission Date: 2020  Hospital Length of Stay: 13 days  Attending Physician: Marcos Hudson MD  Primary Care Provider: Primary Doctor No    Subjective:     Principal Problem: premature rupture of membranes (PPROM) with unknown onset of labor    HPI:  Fany May is a 25 y.o. [id number] at 31w4d with di-di twin pregnancy who presents from [facility] clinic for concern for rupture of membranes. Patient endorses loss of fluid since , when she presented to the [facility] and had a negative rupture exam, bedside ultrasound demonstrated membrane with fluid pockets on both sides. She also endorses contractions that have increased in frequency and intensity. Denies vaginal bleeding,   Fetal Movement: normal.     MFM: BPP of 8/8 for A and B. Multiple sites of sac separation between twin A and twin B were found on MFM ultrasound concerning for chorioamniotic membrane separation and PPROM.     With MFM ultrasound and given patient's self described concerns about PPROM, this fits known data   concerning pPROM of one or both sacs confined by membranes.     Pregnancy also complicated by FGR 8th percentile A , B 11%.        Hospital Course:  2020 Overnight patient endorsed increased contractions with pain increasing from 3 to 5. Patient cervix was re-checked and found to be unchanged. Since then, the patient's contractions have spaced out to q10-20 minutes. Patient is still feeling them and rating them at 5/10 pain. Continues to endorse trickling of fluid. Denies vaginal bleeding. Endorses fetal movement.  2020 Called to patient's room once overnight. Placed on FHT for 1 hour, no contractions noted on monitor. Called out again for a different constant abdominal pain. SSE performed, cervix visually unchanged 1-2 cm, white discharge noted, but no vaginal pooling or fluid noted. Pain relieved by  "tylenol. Continues to endorse trickling of fluid. Denies vaginal bleeding. Endorses fetal movement.  07/26/2020 NSTs reassuring yesterday; patient denies any pain and did not experience any pain yesterday; endorses fetal movement; no bleeding; no contractions  07/27/2020 NST reassuring yesterday. Pt reports continued minimal leakage of clear fluid, worse when standing or with fetal movement. She complains of burning epigastric pain, worse when laying down. No vaginal bleeding, no contractions.  07/28/2020 NST reassuring yesterday. Patient reports continued minimal leakage of clear fluid, worse when standing & ambulating. Epigastric pain has improved with PPI. Feeling abdominal "tightening" 2 times per day, no regular contractions, no vaginal bleeding, endorses regular fetal movement. Pt complains of occasional mild HA, improves spontaneously. Denies visual changes, CP, SOB, RUQ pain.  07/29/2020 NST reassuring yesterday. Patient feels leakage of fluid has improved. Epigastric pain and HA have resolved. Still feeling abdominal tightening and cramping intermittently, unable to specify frequency, was able to sleep through the night. One episode of increased pelvic pressure at 2000, spec exam was unchanged 2/60/-3. Patient feeling very down about staying in the hospital for an extended period.   07/30/2020 NST reassuring yesterday. Patient feels leakage of fluid continues to decrease but still present when standing. Complains of intermittent pelvic pressure, worsening over past 12 hours. Patient denying symptoms of depression and declines SSRI at this time.  07/31/2020 Patient felt increasing pelvic pressure last night & placed on monitor, FHT was reactive & reassuring, TOCO with ctx [id number]. No abdominal pain/pressure at present. Patient still endorsing mild leakage of fluid. Still having epigastric burning with PO intake, unable to tolerate liquid famotidine but unwilling to try to swallow pills at this " time.  08/01/2020: NST reactive, reassuring. Continues to endorse feeling LOF at times. Transfused 1u pRBC for anemia  08/02/2020 NSTreactive reassuring x 2 overnight. Had one episode of vaginal spotting yesterday. Negative speculum exam. Continuous monitoring reassuring.   08/03/2020-08/05/2020   NST r/r. No complaints apart from slow LOF. Continue course    Obstetric HPI:  Patient reports None contractions, active fetal movement, absent vaginal bleeding, small loss of fluid. No further vaginal bleeding, spotting from overnight.      Objective:     Vital Signs (Most Recent):  Temp: 97.9 °F (36.6 °C) (08/05/20 0324)  Pulse: 85 (08/05/20 0324)  Resp: 16 (08/05/20 0324)  BP: (!) 96/50 (08/05/20 0324)  SpO2: 98 % (08/05/20 0324) Vital Signs (24h Range):  Temp:  [97.5 °F (36.4 °C)-98.4 °F (36.9 °C)] 97.9 °F (36.6 °C)  Pulse:  [] 85  Resp:  [16-18] 16  SpO2:  [98 %-100 %] 98 %  BP: ()/(50-55) 96/50     Weight: 47.3 kg (104 lb 4.4 oz)  Body mass index is 20.37 kg/m².    NST reactive, reassuring overnight; See note    No intake or output data in the 24 hours ending 08/05/20 0459  Significant Labs:  Recent Lab Results       08/04/20 2047 08/04/20  1615   08/04/20  0932   08/04/20  0547        Glucose, Fasting       92     Group & Rh       B POS     INDIRECT DHAVAL       NEG     POCT Glucose 113 107 116             Physical Exam:   Constitutional: She is oriented to person, place, and time. She appears well-developed and well-nourished. No distress.    HENT:   Head: Normocephalic and atraumatic.    Eyes: Pupils are equal, round, and reactive to light. EOM are normal.    Neck: Normal range of motion.    Cardiovascular: Normal rate and regular rhythm.     Pulmonary/Chest: Effort normal.   Normal work of breathing          Abdominal: Soft. She exhibits no distension. There is no abdominal tenderness. There is no rebound and no guarding.   Gravid uterus, appropriate for gestational age/twin pregnancy              Musculoskeletal: Normal range of motion. No tenderness or edema.       Neurological: She is alert and oriented to person, place, and time.    Skin: Skin is warm and dry.    Psychiatric: She has a normal mood and affect.       Assessment/Plan:     25 y.o. female  at 33w3d for:    *  premature rupture of membranes (PPROM) with unknown onset of labor  -: presented to the [facility] with concern for gush of fluid, negative rupture exam, bedside ultrasound demonstrated two pockets of fluid across the visible membrane in both A and B MVP > 2  -: presented to MFM appointment complaining of continued trickling since  clear fluid  -: MFM ultrasound demonstrated multiple sites of sac separation in this US, concerning for CMS (chorioamniotic membrane separation). Given patient's self described concerns about PPROM (see note from OB ED on ), this fits known data concerning PPROM of one or both sacs confined by membranes In [facility], rupture exam negative, bedside ultrasound revealed same pocket of fluid between the two amniotic membranes  - now s/p 10 days latency antibiotics  - continue to monitor closely for signs of infection   - Expectant management until 34 wga  - Daily NST BID continue to be reactive, reassuring    Elevated glucose tolerance test  -Failed one hour glucose screen  -Pt had BS trended for 7d; <50%   -Does not meet criteria for GDM    31 weeks gestation of pregnancy  - Consents for Delivery and Blood signed  - BMZ course complete -  - GBS neg  - Monitor fetal status closely    - fetal position confirmed as cephalic/breech   - normal MVP on limited ultrasound   - FHT reactive and reassuring, TOCO ctx [id number] with baseline uterine irritability  - Last US on : EFW was A: 1340 (8th percent), B 1427 g (11%)  - As EFW now likely > 1500 and given GA, DARRELL may be considered if OB provider in house is comfortable with breech extraction, risks discussed with patient, if not,  would be for  at 34w  - Diet Regular, NST BID      Iron deficiency anemia  - H/H on admit 8.2/26.7  - Iron studies confirmed Fe def anemia  -  H/H 7./. s/p 1u pRBCs  - Appropriate rise in H/H. Asymptomatic    Desires immediate post-placental Mirena  - Medicaid  -will order if patient proceeds with delivery    Sickle cell trait              JANIE Frances MD  [facility] PGY2

## 2020-08-05 NOTE — ASSESSMENT & PLAN NOTE
- Consents for Delivery and Blood signed  - BMZ course complete -  - GBS neg  - Monitor fetal status closely    - fetal position confirmed as cephalic/breech   - normal MVP on limited ultrasound   - FHT reactive and reassuring, TOCO ctx [id number] with baseline uterine irritability  - Last US on : EFW was A: 1340 (8th percent), B 1427 g (11%)  - As EFW now likely > 1500 and given GA, DARRELL may be considered if OB provider in house is comfortable with breech extraction, risks discussed with patient, if not, would be for  at 34w  - Diet Regular, NST BID

## 2020-08-06 PROCEDURE — 76819 PR US, OB, FETAL BIOPHYSICAL, W/O NST: ICD-10-PCS | Mod: 26,59,, | Performed by: OBSTETRICS & GYNECOLOGY

## 2020-08-06 PROCEDURE — 76819 FETAL BIOPHYS PROFIL W/O NST: CPT

## 2020-08-06 PROCEDURE — 25000003 PHARM REV CODE 250: Performed by: STUDENT IN AN ORGANIZED HEALTH CARE EDUCATION/TRAINING PROGRAM

## 2020-08-06 PROCEDURE — 76820 UMBILICAL ARTERY ECHO: CPT | Mod: 26,,, | Performed by: OBSTETRICS & GYNECOLOGY

## 2020-08-06 PROCEDURE — 11000001 HC ACUTE MED/SURG PRIVATE ROOM

## 2020-08-06 PROCEDURE — 99232 PR SUBSEQUENT HOSPITAL CARE,LEVL II: ICD-10-PCS | Mod: 25,,, | Performed by: OBSTETRICS & GYNECOLOGY

## 2020-08-06 PROCEDURE — 99232 SBSQ HOSP IP/OBS MODERATE 35: CPT | Mod: 25,,, | Performed by: OBSTETRICS & GYNECOLOGY

## 2020-08-06 PROCEDURE — 76820 UMBILICAL ARTERY ECHO: CPT

## 2020-08-06 PROCEDURE — 76819 FETAL BIOPHYS PROFIL W/O NST: CPT | Mod: 26,,, | Performed by: OBSTETRICS & GYNECOLOGY

## 2020-08-06 PROCEDURE — 76820 PR US, OB DOPPLER, FETAL UMBILICAL ARTERY ECHO: ICD-10-PCS | Mod: 26,,, | Performed by: OBSTETRICS & GYNECOLOGY

## 2020-08-06 RX ADMIN — MINERAL SUPPLEMENT IRON 300 MG / 5 ML STRENGTH LIQUID 100 PER BOX UNFLAVORED 300 MG: at 09:08

## 2020-08-06 NOTE — ASSESSMENT & PLAN NOTE
-7/16: presented to the [facility] with concern for gush of fluid, negative rupture exam, bedside ultrasound demonstrated two pockets of fluid across the visible membrane in both A and B MVP > 2  -7/23: presented to MFM appointment complaining of continued trickling since 7/16 clear fluid  -7/23: MFM ultrasound demonstrated multiple sites of sac separation in this US, concerning for CMS (chorioamniotic membrane separation). Given patient's self described concerns about PPROM (see note from OB ED on 07/16), this fits known data concerning PPROM of one or both sacs confined by membranes In [facility], rupture exam negative, bedside ultrasound revealed same pocket of fluid between the two amniotic membranes  - now s/p 10 days latency antibiotics  - continue to monitor closely for signs of infection   - Expectant management until 34 wga  - Daily NST BID continue to be reactive, reassuring

## 2020-08-06 NOTE — SUBJECTIVE & OBJECTIVE
Obstetric HPI:  Patient reports None contractions, active fetal movement, absent vaginal bleeding, small loss of fluid. No further vaginal bleeding, spotting from overnight.      Objective:     Vital Signs (Most Recent):  Temp: 98.2 °F (36.8 °C) (08/06/20 0005)  Pulse: 98 (08/06/20 0005)  Resp: 15 (08/06/20 0005)  BP: (!) 97/57 (08/06/20 0005)  SpO2: 98 % (08/06/20 0005) Vital Signs (24h Range):  Temp:  [98.2 °F (36.8 °C)-98.6 °F (37 °C)] 98.2 °F (36.8 °C)  Pulse:  [] 98  Resp:  [15-18] 15  SpO2:  [96 %-98 %] 98 %  BP: (93-99)/(54-59) 97/57     Weight: 50.8 kg (112 lb)  Body mass index is 21.87 kg/m².    NST reactive, reassuring overnight; See note    No intake or output data in the 24 hours ending 08/06/20 0450  Significant Labs:  Recent Lab Results       08/05/20  2134   08/05/20  1646   08/05/20  1106        POCT Glucose 114 106 111           Physical Exam:   Constitutional: She is oriented to person, place, and time. She appears well-developed and well-nourished. No distress.    HENT:   Head: Normocephalic and atraumatic.    Eyes: Pupils are equal, round, and reactive to light. EOM are normal.    Neck: Normal range of motion.    Cardiovascular: Normal rate and regular rhythm.     Pulmonary/Chest: Effort normal.   Normal work of breathing          Abdominal: Soft. She exhibits no distension. There is no abdominal tenderness. There is no rebound and no guarding.   Gravid uterus, appropriate for gestational age/twin pregnancy             Musculoskeletal: Normal range of motion. No tenderness or edema.       Neurological: She is alert and oriented to person, place, and time.    Skin: Skin is warm and dry.    Psychiatric: She has a normal mood and affect.

## 2020-08-06 NOTE — ASSESSMENT & PLAN NOTE
- Consents for Delivery and Blood signed  - BMZ course complete -  - GBS neg  - Monitor fetal status closely    - fetal position confirmed as cephalic/breech   - normal MVP on limited ultrasound   - FHT reactive and reassuring, TOCO ctx [id number] with baseline uterine irritability  - Last US on : EFW was A: 1340 (8th percent), B 1427 g (11%)  - As EFW now likely > 1500 and given GA, DARRELL may be considered if OB provider in house is comfortable with breech extraction, risks discussed with patient, if not, would be for  at 34w  - Diet Regular, NST BID

## 2020-08-06 NOTE — PROGRESS NOTES
[facility]  Obstetrics  Antepartum Progress Note    Patient Name: Fany May  MRN: [id number]  Admission Date: 2020  Hospital Length of Stay: 14 days  Attending Physician: Marcos Hudson MD  Primary Care Provider: Primary Doctor No    Subjective:     Principal Problem: premature rupture of membranes (PPROM) with unknown onset of labor    HPI:  Fany May is a 25 y.o. [id number] at 31w4d with di-di twin pregnancy who presents from [facility] clinic for concern for rupture of membranes. Patient endorses loss of fluid since , when she presented to the [facility] and had a negative rupture exam, bedside ultrasound demonstrated membrane with fluid pockets on both sides. She also endorses contractions that have increased in frequency and intensity. Denies vaginal bleeding,   Fetal Movement: normal.     MFM: BPP of 8/8 for A and B. Multiple sites of sac separation between twin A and twin B were found on MFM ultrasound concerning for chorioamniotic membrane separation and PPROM.     With MFM ultrasound and given patient's self described concerns about PPROM, this fits known data   concerning pPROM of one or both sacs confined by membranes.     Pregnancy also complicated by FGR 8th percentile A , B 11%.        Hospital Course:  2020 Overnight patient endorsed increased contractions with pain increasing from 3 to 5. Patient cervix was re-checked and found to be unchanged. Since then, the patient's contractions have spaced out to q10-20 minutes. Patient is still feeling them and rating them at 5/10 pain. Continues to endorse trickling of fluid. Denies vaginal bleeding. Endorses fetal movement.  2020 Called to patient's room once overnight. Placed on FHT for 1 hour, no contractions noted on monitor. Called out again for a different constant abdominal pain. SSE performed, cervix visually unchanged 1-2 cm, white discharge noted, but no vaginal pooling or fluid noted. Pain relieved by  "tylenol. Continues to endorse trickling of fluid. Denies vaginal bleeding. Endorses fetal movement.  07/26/2020 NSTs reassuring yesterday; patient denies any pain and did not experience any pain yesterday; endorses fetal movement; no bleeding; no contractions  07/27/2020 NST reassuring yesterday. Pt reports continued minimal leakage of clear fluid, worse when standing or with fetal movement. She complains of burning epigastric pain, worse when laying down. No vaginal bleeding, no contractions.  07/28/2020 NST reassuring yesterday. Patient reports continued minimal leakage of clear fluid, worse when standing & ambulating. Epigastric pain has improved with PPI. Feeling abdominal "tightening" 2 times per day, no regular contractions, no vaginal bleeding, endorses regular fetal movement. Pt complains of occasional mild HA, improves spontaneously. Denies visual changes, CP, SOB, RUQ pain.  07/29/2020 NST reassuring yesterday. Patient feels leakage of fluid has improved. Epigastric pain and HA have resolved. Still feeling abdominal tightening and cramping intermittently, unable to specify frequency, was able to sleep through the night. One episode of increased pelvic pressure at 2000, spec exam was unchanged 2/60/-3. Patient feeling very down about staying in the hospital for an extended period.   07/30/2020 NST reassuring yesterday. Patient feels leakage of fluid continues to decrease but still present when standing. Complains of intermittent pelvic pressure, worsening over past 12 hours. Patient denying symptoms of depression and declines SSRI at this time.  07/31/2020 Patient felt increasing pelvic pressure last night & placed on monitor, FHT was reactive & reassuring, TOCO with ctx [id number]. No abdominal pain/pressure at present. Patient still endorsing mild leakage of fluid. Still having epigastric burning with PO intake, unable to tolerate liquid famotidine but unwilling to try to swallow pills at this " time.  08/01/2020: NST reactive, reassuring. Continues to endorse feeling LOF at times. Transfused 1u pRBC for anemia  08/02/2020 NSTreactive reassuring x 2 overnight. Had one episode of vaginal spotting yesterday. Negative speculum exam. Continuous monitoring reassuring.   08/03/2020-08/06/2020    NST r/r. BMZ and latency abx completed. No complaints apart from slow LOF. Continue course    Obstetric HPI:  Patient reports None contractions, active fetal movement, absent vaginal bleeding, small loss of fluid. No further vaginal bleeding, spotting from overnight.      Objective:     Vital Signs (Most Recent):  Temp: 98.2 °F (36.8 °C) (08/06/20 0005)  Pulse: 98 (08/06/20 0005)  Resp: 15 (08/06/20 0005)  BP: (!) 97/57 (08/06/20 0005)  SpO2: 98 % (08/06/20 0005) Vital Signs (24h Range):  Temp:  [98.2 °F (36.8 °C)-98.6 °F (37 °C)] 98.2 °F (36.8 °C)  Pulse:  [] 98  Resp:  [15-18] 15  SpO2:  [96 %-98 %] 98 %  BP: (93-99)/(54-59) 97/57     Weight: 50.8 kg (112 lb)  Body mass index is 21.87 kg/m².    NST reactive, reassuring overnight; See note    No intake or output data in the 24 hours ending 08/06/20 0450  Significant Labs:  Recent Lab Results       08/05/20  2134   08/05/20  1646   08/05/20  1106        POCT Glucose 114 106 111           Physical Exam:   Constitutional: She is oriented to person, place, and time. She appears well-developed and well-nourished. No distress.    HENT:   Head: Normocephalic and atraumatic.    Eyes: Pupils are equal, round, and reactive to light. EOM are normal.    Neck: Normal range of motion.    Cardiovascular: Normal rate and regular rhythm.     Pulmonary/Chest: Effort normal.   Normal work of breathing          Abdominal: Soft. She exhibits no distension. There is no abdominal tenderness. There is no rebound and no guarding.   Gravid uterus, appropriate for gestational age/twin pregnancy             Musculoskeletal: Normal range of motion. No tenderness or edema.       Neurological:  She is alert and oriented to person, place, and time.    Skin: Skin is warm and dry.    Psychiatric: She has a normal mood and affect.       Assessment/Plan:     25 y.o. female  at 33w4d for:    *  premature rupture of membranes (PPROM) with unknown onset of labor  -: presented to the [facility] with concern for gush of fluid, negative rupture exam, bedside ultrasound demonstrated two pockets of fluid across the visible membrane in both A and B MVP > 2  -: presented to MFM appointment complaining of continued trickling since  clear fluid  -: MFM ultrasound demonstrated multiple sites of sac separation in this US, concerning for CMS (chorioamniotic membrane separation). Given patient's self described concerns about PPROM (see note from OB ED on ), this fits known data concerning PPROM of one or both sacs confined by membranes In [facility], rupture exam negative, bedside ultrasound revealed same pocket of fluid between the two amniotic membranes  - now s/p 10 days latency antibiotics  - continue to monitor closely for signs of infection   - Expectant management until 34 wga  - Daily NST BID continue to be reactive, reassuring    Elevated glucose tolerance test  -Failed one hour glucose screen  -Pt had BS trended for 7d; <50%   -Does not meet criteria for GDM    31 weeks gestation of pregnancy  - Consents for Delivery and Blood signed  - BMZ course complete -  - GBS neg  - Monitor fetal status closely    - fetal position confirmed as cephalic/breech   - normal MVP on limited ultrasound   - FHT reactive and reassuring, TOCO ctx [id number] with baseline uterine irritability  - Last US on : EFW was A: 1340 (8th percent), B 1427 g (11%)  - As EFW now likely > 1500 and given GA, DARRELL may be considered if OB provider in house is comfortable with breech extraction, risks discussed with patient, if not, would be for  at 34w  - Diet Regular, NST BID      Iron deficiency anemia  -  H/H on admit 8.2/26.7  - Iron studies confirmed Fe def anemia  - 7/31 H/H 7.4/23. s/p 1u pRBCs  - Appropriate rise in H/H. Asymptomatic    Desires immediate post-placental Mirena  - Medicaid  -will order if patient proceeds with delivery    Sickle cell trait              MD BRINA Quinonez PGY2     ------------------------------------  Patient seen and examined. No complaints overnight.    Temp:  [97.8 °F (36.6 °C)-98.6 °F (37 °C)] 97.8 °F (36.6 °C)  Pulse:  [] 90  Resp:  [15-18] 18  SpO2:  [96 %-98 %] 98 %  BP: (84-99)/(49-62) 92/62    NST: Twin A: 140 bpm/moderate variability/ 2+ accels/ no decels           Twin B: 150 bpm/moderate variability/2+ accels/no decels  [place]: quiet      Continue inpatient management. Plan for delivery at 34 weeks unless earlier indication arises.    Katya Diamond MD  PGY 5  Maternal Fetal Medicine  [facility]

## 2020-08-06 NOTE — PLAN OF CARE
Patient's VSS and afebrile throughout shift. Patient reports positive fetal movement, denies vaginal bleeding and contractions/ cramping. Small amount of clear amniotic fluid present, no new changes regarding fluid leakage. Call light within reach, will continue to monitor.

## 2020-08-07 ENCOUNTER — ANESTHESIA EVENT (OUTPATIENT)
Dept: OBSTETRICS AND GYNECOLOGY | Facility: OTHER | Age: 25
End: 2020-08-07
Payer: MEDICAID

## 2020-08-07 ENCOUNTER — ANESTHESIA (OUTPATIENT)
Dept: OBSTETRICS AND GYNECOLOGY | Facility: OTHER | Age: 25
End: 2020-08-07
Payer: MEDICAID

## 2020-08-07 LAB
ABO + RH BLD: NORMAL
BLD GP AB SCN CELLS X3 SERPL QL: NORMAL

## 2020-08-07 PROCEDURE — 25000003 PHARM REV CODE 250: Performed by: STUDENT IN AN ORGANIZED HEALTH CARE EDUCATION/TRAINING PROGRAM

## 2020-08-07 PROCEDURE — 86850 RBC ANTIBODY SCREEN: CPT

## 2020-08-07 PROCEDURE — 59025 FETAL NON-STRESS TEST: CPT | Mod: 26,,, | Performed by: OBSTETRICS & GYNECOLOGY

## 2020-08-07 PROCEDURE — 11000001 HC ACUTE MED/SURG PRIVATE ROOM

## 2020-08-07 PROCEDURE — 99232 SBSQ HOSP IP/OBS MODERATE 35: CPT | Mod: 25,,, | Performed by: OBSTETRICS & GYNECOLOGY

## 2020-08-07 PROCEDURE — 99232 PR SUBSEQUENT HOSPITAL CARE,LEVL II: ICD-10-PCS | Mod: 25,,, | Performed by: OBSTETRICS & GYNECOLOGY

## 2020-08-07 PROCEDURE — 59025 PR FETAL 2N-STRESS TEST: ICD-10-PCS | Mod: 26,,, | Performed by: OBSTETRICS & GYNECOLOGY

## 2020-08-07 PROCEDURE — 36415 COLL VENOUS BLD VENIPUNCTURE: CPT

## 2020-08-07 RX ADMIN — MINERAL SUPPLEMENT IRON 300 MG / 5 ML STRENGTH LIQUID 100 PER BOX UNFLAVORED 300 MG: at 08:08

## 2020-08-07 NOTE — PLAN OF CARE
Pt reports no acute changes or complaints throughout night. VS stable and afebrile. Pt reports + FM, denies vaginal bleeding or contractions. Pt denies headache, blurry vision, or RUQ pain. Plan of care reviewed with patient. All questions and concerns addressed at this time.

## 2020-08-07 NOTE — PROGRESS NOTES
"[facility]  Obstetrics  Postpartum Progress Note    Patient Name: Fany May  MRN: [id number]  Admission Date: 2020  Hospital Length of Stay: 15 days  Attending Physician: Marcos Hduson MD  Primary Care Provider: Primary Doctor No    Subjective:     Principal Problem: premature rupture of membranes (PPROM) with unknown onset of labor    Hospital Course:  2020 Overnight patient endorsed increased contractions with pain increasing from 3 to 5. Patient cervix was re-checked and found to be unchanged. Since then, the patient's contractions have spaced out to q10-20 minutes. Patient is still feeling them and rating them at 5/10 pain. Continues to endorse trickling of fluid. Denies vaginal bleeding. Endorses fetal movement.  2020 Called to patient's room once overnight. Placed on FHT for 1 hour, no contractions noted on monitor. Called out again for a different constant abdominal pain. SSE performed, cervix visually unchanged 1-2 cm, white discharge noted, but no vaginal pooling or fluid noted. Pain relieved by tylenol. Continues to endorse trickling of fluid. Denies vaginal bleeding. Endorses fetal movement.  2020 NSTs reassuring yesterday; patient denies any pain and did not experience any pain yesterday; endorses fetal movement; no bleeding; no contractions  2020 NST reassuring yesterday. Pt reports continued minimal leakage of clear fluid, worse when standing or with fetal movement. She complains of burning epigastric pain, worse when laying down. No vaginal bleeding, no contractions.  2020 NST reassuring yesterday. Patient reports continued minimal leakage of clear fluid, worse when standing & ambulating. Epigastric pain has improved with PPI. Feeling abdominal "tightening" 2 times per day, no regular contractions, no vaginal bleeding, endorses regular fetal movement. Pt complains of occasional mild HA, improves spontaneously. Denies visual changes, CP, " SOB, RUQ pain.  2020 NST reassuring yesterday. Patient feels leakage of fluid has improved. Epigastric pain and HA have resolved. Still feeling abdominal tightening and cramping intermittently, unable to specify frequency, was able to sleep through the night. One episode of increased pelvic pressure at , spec exam was unchanged /-3. Patient feeling very down about staying in the hospital for an extended period.   2020 NST reassuring yesterday. Patient feels leakage of fluid continues to decrease but still present when standing. Complains of intermittent pelvic pressure, worsening over past 12 hours. Patient denying symptoms of depression and declines SSRI at this time.  2020 Patient felt increasing pelvic pressure last night & placed on monitor, FHT was reactive & reassuring, TOCO with ctx [id number]. No abdominal pain/pressure at present. Patient still endorsing mild leakage of fluid. Still having epigastric burning with PO intake, unable to tolerate liquid famotidine but unwilling to try to swallow pills at this time.  2020: NST reactive, reassuring. Continues to endorse feeling LOF at times. Transfused 1u pRBC for anemia  2020 NSTreactive reassuring x 2 overnight. Had one episode of vaginal spotting yesterday. Negative speculum exam. Continuous monitoring reassuring.   2020-2020     NST r/r. BMZ and latency abx completed. No complaints apart from slow LOF. Continue course.    No new subjective & objective note has been filed under this hospital service since the last note was generated.    Assessment/Plan:     25 y.o. female  for:    *  premature rupture of membranes (PPROM) with unknown onset of labor  -: presented to the [facility] with concern for gush of fluid, negative rupture exam, bedside ultrasound demonstrated two pockets of fluid across the visible membrane in both A and B MVP > 2  -: presented to [facility] appointment complaining of continued  trickling since  clear fluid  -: MFM ultrasound demonstrated multiple sites of sac separation in this US, concerning for CMS (chorioamniotic membrane separation). Given patient's self described concerns about PPROM (see note from OB ED on ), this fits known data concerning PPROM of one or both sacs confined by membranes In [facility], rupture exam negative, bedside ultrasound revealed same pocket of fluid between the two amniotic membranes  - now s/p 10 days latency antibiotics  - continue to monitor closely for signs of infection   - Expectant management until 34 wga  - Daily NST BID continue to be reactive, reassuring    Elevated glucose tolerance test  -Failed one hour glucose screen  -Pt had BS trended for 7d; <50%   -Does not meet criteria for GDM    31 weeks gestation of pregnancy  - Consents for Delivery and Blood signed  - BMZ course complete -  - GBS neg  - Monitor fetal status closely    - fetal position confirmed as cephalic/breech   - normal MVP on limited ultrasound   - FHT reactive and reassuring, TOCO ctx [id number] with baseline uterine irritability  - Last US on : EFW was A: 1340 (8th percent), B 1427 g (11%)  - As EFW now likely > 1500 and given GA, DARRELL may be considered if OB provider in house is comfortable with breech extraction, risks discussed with patient, if not, would be for  at 34w  - Diet Regular, NST BID      Iron deficiency anemia  - H/H on admit 8.2/26.7  - Iron studies confirmed Fe def anemia  -  H/H 7.. s/p 1u pRBCs  - Appropriate rise in H/H. Asymptomatic    Desires immediate post-placental Mirena  - Medicaid  -will order if patient proceeds with delivery    Sickle cell trait                JANIE Frances MD  OBGYN PGY2

## 2020-08-07 NOTE — PLAN OF CARE
V/S stable. Pt denies vaginal bleeding, foul smelling/ discolored LOF, abd cramping, and reports +Fm x2. Pt is ambulating, voiding, and stooling, independently. Pt denies pain and does not appear to be in any distress. Pt safety maintained. Will continue to monitor.

## 2020-08-08 PROCEDURE — 59025 PR FETAL 2N-STRESS TEST: ICD-10-PCS | Mod: 26,59,, | Performed by: OBSTETRICS & GYNECOLOGY

## 2020-08-08 PROCEDURE — 11000001 HC ACUTE MED/SURG PRIVATE ROOM

## 2020-08-08 PROCEDURE — 99232 SBSQ HOSP IP/OBS MODERATE 35: CPT | Mod: 25,,, | Performed by: OBSTETRICS & GYNECOLOGY

## 2020-08-08 PROCEDURE — 59025 FETAL NON-STRESS TEST: CPT | Mod: 26,,, | Performed by: OBSTETRICS & GYNECOLOGY

## 2020-08-08 PROCEDURE — 25000003 PHARM REV CODE 250: Performed by: STUDENT IN AN ORGANIZED HEALTH CARE EDUCATION/TRAINING PROGRAM

## 2020-08-08 PROCEDURE — 99232 PR SUBSEQUENT HOSPITAL CARE,LEVL II: ICD-10-PCS | Mod: 25,,, | Performed by: OBSTETRICS & GYNECOLOGY

## 2020-08-08 RX ADMIN — MINERAL SUPPLEMENT IRON 300 MG / 5 ML STRENGTH LIQUID 100 PER BOX UNFLAVORED 300 MG: at 08:08

## 2020-08-08 NOTE — PROGRESS NOTES
[facility]  Obstetrics  Antepartum Progress Note    Patient Name: Fany May  MRN: [id number]  Admission Date: 2020  Hospital Length of Stay: 16 days  Attending Physician: Marcos Hudson MD  Primary Care Provider: Primary Doctor No    Subjective:     Principal Problem: premature rupture of membranes (PPROM) with unknown onset of labor    HPI:  Fany May is a 25 y.o. [id number] at 31w4d with di-di twin pregnancy who presents from [facility] clinic for concern for rupture of membranes. Patient endorses loss of fluid since , when she presented to the [facility] and had a negative rupture exam, bedside ultrasound demonstrated membrane with fluid pockets on both sides. She also endorses contractions that have increased in frequency and intensity. Denies vaginal bleeding,   Fetal Movement: normal.     MFM: BPP of 8/8 for A and B. Multiple sites of sac separation between twin A and twin B were found on MFM ultrasound concerning for chorioamniotic membrane separation and PPROM.     With MFM ultrasound and given patient's self described concerns about PPROM, this fits known data   concerning pPROM of one or both sacs confined by membranes.     Pregnancy also complicated by FGR 8th percentile A , B 11%.        Hospital Course:  2020 Overnight patient endorsed increased contractions with pain increasing from 3 to 5. Patient cervix was re-checked and found to be unchanged. Since then, the patient's contractions have spaced out to q10-20 minutes. Patient is still feeling them and rating them at 5/10 pain. Continues to endorse trickling of fluid. Denies vaginal bleeding. Endorses fetal movement.  2020 Called to patient's room once overnight. Placed on FHT for 1 hour, no contractions noted on monitor. Called out again for a different constant abdominal pain. SSE performed, cervix visually unchanged 1-2 cm, white discharge noted, but no vaginal pooling or fluid noted. Pain relieved by  "tylenol. Continues to endorse trickling of fluid. Denies vaginal bleeding. Endorses fetal movement.  07/26/2020 NSTs reassuring yesterday; patient denies any pain and did not experience any pain yesterday; endorses fetal movement; no bleeding; no contractions  07/27/2020 NST reassuring yesterday. Pt reports continued minimal leakage of clear fluid, worse when standing or with fetal movement. She complains of burning epigastric pain, worse when laying down. No vaginal bleeding, no contractions.  07/28/2020 NST reassuring yesterday. Patient reports continued minimal leakage of clear fluid, worse when standing & ambulating. Epigastric pain has improved with PPI. Feeling abdominal "tightening" 2 times per day, no regular contractions, no vaginal bleeding, endorses regular fetal movement. Pt complains of occasional mild HA, improves spontaneously. Denies visual changes, CP, SOB, RUQ pain.  07/29/2020 NST reassuring yesterday. Patient feels leakage of fluid has improved. Epigastric pain and HA have resolved. Still feeling abdominal tightening and cramping intermittently, unable to specify frequency, was able to sleep through the night. One episode of increased pelvic pressure at 2000, spec exam was unchanged 2/60/-3. Patient feeling very down about staying in the hospital for an extended period.   07/30/2020 NST reassuring yesterday. Patient feels leakage of fluid continues to decrease but still present when standing. Complains of intermittent pelvic pressure, worsening over past 12 hours. Patient denying symptoms of depression and declines SSRI at this time.  07/31/2020 Patient felt increasing pelvic pressure last night & placed on monitor, FHT was reactive & reassuring, TOCO with ctx [id number]. No abdominal pain/pressure at present. Patient still endorsing mild leakage of fluid. Still having epigastric burning with PO intake, unable to tolerate liquid famotidine but unwilling to try to swallow pills at this " time.  08/01/2020: NST reactive, reassuring. Continues to endorse feeling LOF at times. Transfused 1u pRBC for anemia  08/02/2020 NSTreactive reassuring x 2 overnight. Had one episode of vaginal spotting yesterday. Negative speculum exam. Continuous monitoring reassuring.   08/03/2020-08/08/2020     NST r/r. BMZ and latency abx completed. No complaints apart from slow LOF. Continue course.    Obstetric HPI:  Patient reports None contractions, active fetal movement x 2, absent vaginal bleeding, small loss of fluid. No further vaginal bleeding, spotting from overnight.      Objective:     Vital Signs (Most Recent):  Temp: 98.2 °F (36.8 °C) (08/08/20 0407)  Pulse: 85 (08/08/20 0407)  Resp: 16 (08/08/20 0407)  BP: (!) 94/53 (08/08/20 0407)  SpO2: 98 % (08/08/20 0406) Vital Signs (24h Range):  Temp:  [97.7 °F (36.5 °C)-98.5 °F (36.9 °C)] 98.2 °F (36.8 °C)  Pulse:  [77-97] 85  Resp:  [16-18] 16  SpO2:  [97 %-99 %] 98 %  BP: ()/(50-61) 94/53     Weight: 50.8 kg (112 lb)  Body mass index is 21.87 kg/m².    NST reactive, reassuring overnight; See note    No intake or output data in the 24 hours ending 08/08/20 0820  Significant Labs:  Recent Lab Results     None          Physical Exam:   Constitutional: She is oriented to person, place, and time. She appears well-developed and well-nourished. No distress.    HENT:   Head: Normocephalic and atraumatic.    Eyes: Pupils are equal, round, and reactive to light. EOM are normal.    Neck: Normal range of motion.    Cardiovascular: Normal rate and regular rhythm.     Pulmonary/Chest: Effort normal.   Normal work of breathing          Abdominal: Soft. She exhibits no distension. There is no abdominal tenderness. There is no rebound and no guarding.   Gravid uterus, appropriate for gestational age/twin pregnancy             Musculoskeletal: Normal range of motion. No tenderness or edema.       Neurological: She is alert and oriented to person, place, and time.    Skin: Skin is  warm and dry.    Psychiatric: She has a normal mood and affect.       Assessment/Plan:     25 y.o. female  at 33w6d for:    *  premature rupture of membranes (PPROM) with unknown onset of labor  -: presented to the [facility] with concern for gush of fluid, negative rupture exam, bedside ultrasound demonstrated two pockets of fluid across the visible membrane in both A and B MVP > 2  -: presented to MFM appointment complaining of continued trickling since  clear fluid  -: MFM ultrasound demonstrated multiple sites of sac separation in this US, concerning for CMS (chorioamniotic membrane separation). Given patient's self described concerns about PPROM (see note from OB ED on ), this fits known data concerning PPROM of one or both sacs confined by membranes In [facility], rupture exam negative, bedside ultrasound revealed same pocket of fluid between the two amniotic membranes  - now s/p 10 days latency antibiotics  - continue to monitor closely for signs of infection   - Expectant management until 34 wga  - Daily NST BID continue to be reactive, reassuring  - Plan for early Monday morning initiation of IOL    Elevated glucose tolerance test  -Failed one hour glucose screen  -Pt had BS trended for 7d; <50%   -Does not meet criteria for GDM    31 weeks gestation of pregnancy  - Consents for Delivery and Blood signed  - BMZ course complete -  - GBS neg  - Monitor fetal status closely    - fetal position confirmed as cephalic/breech   - normal MVP on limited ultrasound   - FHT reactive and reassuring, TOCO ctx [id number] with baseline uterine irritability  - Last US on : EFW was A: 1340 (8th percent), B 1427 g (11%)  - As EFW now likely > 1500 and given GA, DARRELL may be considered if OB provider in house is comfortable with breech extraction, risks discussed with patient, if not, would be for  at 34w  - Diet Regular, NST BID      Iron deficiency anemia  - H/H on admit  8.2/26.7  - Iron studies confirmed Fe def anemia  - 7/31 H/H 7.4/23. s/p 1u pRBCs  - Appropriate rise in H/H. Asymptomatic  - consider holding blood for delivery given increased PPH risk with multiples    Desires immediate post-placental Mirena  - Medicaid  - will order if patient proceeds with delivery        Sushma K Reddy, MD  Obstetrics  [facility]

## 2020-08-08 NOTE — PLAN OF CARE
Pt slept comfortably over night. No acute changes. Denies, pain,cxts, and VB. Still has minimal LOF. Confirms + fetal movement x 2. VSS and remains afebrile. POC reviewed. All questions and concerns answered at this time. Pt safety remained throughout the with call light in reach. Will continue to monitor.

## 2020-08-08 NOTE — ANESTHESIA PREPROCEDURE EVALUATION
2020  Fany May is a 25 y.o., female.  Fany May is a 25 y.o. female [id number] at 31w4d with di-di twin pregnancy who presents with concern for PROM. Patient denies issues with previous epidural, neck/back dz, bleeding d/o, cardiopulm dz.    OB History    Para Term  AB Living   2 1 1 0 0 1   SAB TAB Ectopic Multiple Live Births   0 0 0   1      # Outcome Date GA Lbr Vishal/2nd Weight Sex Delivery Anes PTL Lv   2 Current            1 Term 18 39w6d 04:57 / 02:06 3.555 kg (7 lb 13.4 oz) F Vag-Spont EPI N SHEREE      Complications: Cervical laceration       Wt Readings from Last 1 Encounters:   20 1126 50.8 kg (112 lb)   20 1100 47.3 kg (104 lb 4.4 oz)   20 1500 50.8 kg (112 lb)       BP Readings from Last 3 Encounters:   20 (!) 91/54   20 110/70   20 100/60       Patient Active Problem List   Diagnosis    Sickle cell trait    Dichorionic diamniotic twin pregnancy, antepartum    Pregnancy with poor reproductive history, antepartum    Desires immediate post-placental Mirena    Iron deficiency anemia     premature rupture of membranes (PPROM) with unknown onset of labor    31 weeks gestation of pregnancy    Elevated glucose tolerance test       No past surgical history on file.    Social History     Socioeconomic History    Marital status: Single     Spouse name: Not on file    Number of children: Not on file    Years of education: Not on file    Highest education level: Not on file   Occupational History    Not on file   Social Needs    Financial resource strain: Not on file    Food insecurity     Worry: Not on file     Inability: Not on file    Transportation needs     Medical: Not on file     Non-medical: Not on file   Tobacco Use    Smoking status: Never Smoker    Smokeless tobacco: Never Used   Substance and Sexual Activity     Alcohol use: Not Currently    Drug use: Never    Sexual activity: Not Currently     Partners: Male   Lifestyle    Physical activity     Days per week: Not on file     Minutes per session: Not on file    Stress: Not on file   Relationships    Social connections     Talks on phone: Not on file     Gets together: Not on file     Attends Restoration service: Not on file     Active member of club or organization: Not on file     Attends meetings of clubs or organizations: Not on file     Relationship status: Not on file   Other Topics Concern    Not on file   Social History Narrative    Not on file         Chemistry        Component Value Date/Time     07/23/2020 1528    K 3.8 07/23/2020 1528     07/23/2020 1528    CO2 21 (L) 07/23/2020 1528    BUN 4 (L) 07/23/2020 1528    CREATININE 0.6 08/02/2020 0448    GLU 95 07/23/2020 1528        Component Value Date/Time    CALCIUM 8.4 (L) 07/23/2020 1528    ALKPHOS 139 (H) 07/23/2020 1528    AST 12 07/23/2020 1528    ALT 5 (L) 07/23/2020 1528    BILITOT 0.4 07/23/2020 1528    ESTGFRAFRICA >60 08/02/2020 0448    EGFRNONAA >60 08/02/2020 0448            Lab Results   Component Value Date    WBC 14.64 (H) 08/01/2020    HGB 8.7 (L) 08/01/2020    HCT 27.3 (L) 08/01/2020    MCV 71 (L) 08/01/2020     08/01/2020       No results for input(s): PT, INR, PROTIME, APTT in the last 72 hours.                Pre-op Assessment    I have reviewed the Patient Summary Reports.    I have reviewed the NPO Status.   I have reviewed the Medications.     Review of Systems  Anesthesia Hx:  No problems with previous Anesthesia   Denies Personal Hx of Anesthesia complications.   Hematology/Oncology:  Hematology Normal   Oncology Normal     EENT/Dental:EENT/Dental Normal   Cardiovascular:  Cardiovascular Normal     Pulmonary:  Pulmonary Normal    Renal/:  Renal/ Normal     Hepatic/GI:  Hepatic/GI Normal    Musculoskeletal:  Musculoskeletal Normal    Neurological:  Neurology  Normal    Endocrine:  Endocrine Normal    Dermatological:  Skin Normal    Psych:  Psychiatric Normal           Physical Exam  General:  Well nourished    Airway/Jaw/Neck:  Airway Findings: Mouth Opening: Normal Tongue: Normal  General Airway Assessment: Adult  Mallampati: II  TM Distance: Normal, at least 6 cm  Jaw/Neck Findings:  Neck ROM: Normal ROM     Eyes/Ears/Nose:  EYES/EARS/NOSE FINDINGS: Normal   Dental:  Dental Findings: In tact   Chest/Lungs:  Chest/Lungs Findings: Clear to auscultation     Heart/Vascular:  Heart Findings: Rate: Normal  Rhythm: Regular Rhythm        Mental Status:  Mental Status Findings:  Cooperative, Alert and Oriented         Anesthesia Plan  Type of Anesthesia, risks & benefits discussed:  Anesthesia Type:  epidural, CSE, general, spinal  Patient's Preference:   Intra-op Monitoring Plan: standard ASA monitors  Intra-op Monitoring Plan Comments:   Post Op Pain Control Plan: multimodal analgesia, IV/PO Opioids PRN, per primary service following discharge from PACU, intrathecal opioid and epidural analgesia  Post Op Pain Control Plan Comments:   Induction:   IV  Beta Blocker:         Informed Consent: Patient understands risks and agrees with Anesthesia plan.  Questions answered. Anesthesia consent signed with patient.  ASA Score: 2     Day of Surgery Review of History & Physical:            Ready For Surgery From Anesthesia Perspective.

## 2020-08-08 NOTE — ASSESSMENT & PLAN NOTE
-7/16: presented to the [facility] with concern for gush of fluid, negative rupture exam, bedside ultrasound demonstrated two pockets of fluid across the visible membrane in both A and B MVP > 2  -7/23: presented to MFM appointment complaining of continued trickling since 7/16 clear fluid  -7/23: MFM ultrasound demonstrated multiple sites of sac separation in this US, concerning for CMS (chorioamniotic membrane separation). Given patient's self described concerns about PPROM (see note from OB ED on 07/16), this fits known data concerning PPROM of one or both sacs confined by membranes In [facility], rupture exam negative, bedside ultrasound revealed same pocket of fluid between the two amniotic membranes  - now s/p 10 days latency antibiotics  - continue to monitor closely for signs of infection   - Expectant management until 34 wga  - Daily NST BID continue to be reactive, reassuring  - Plan for early Monday morning initiation of IOL

## 2020-08-08 NOTE — ASSESSMENT & PLAN NOTE
- Consents for Delivery and Blood signed  - BMZ course complete -  - GBS neg  - Monitor fetal status closely    - fetal position confirmed as cephalic/breech   - normal MVP on limited ultrasound   - FHT reactive and reassuring, TOCO ctx [id number] with baseline uterine irritability  - Last US on : EFW was A: 1340 (8th percent), B 1427 g (11%)  - As EFW now likely > 1500 and given GA, DARRELL may be considered if OB provider in house is comfortable with breech extraction, risks discussed with patient, if not, would be for  at 34w  - Diet Regular, NST BID

## 2020-08-08 NOTE — ASSESSMENT & PLAN NOTE
- H/H on admit 8.2/26.7  - Iron studies confirmed Fe def anemia  - 7/31 H/H 7.4/23. s/p 1u pRBCs  - Appropriate rise in H/H. Asymptomatic  - consider holding blood for delivery given increased PPH risk with multiples

## 2020-08-08 NOTE — PROGRESS NOTES
MD to bedside for cervical check. AM NST with ctx q 3-6 mins and pt reporting mild discomfort    NST: baby A: 125, cat 1; baby B: 135, cat 1; toco with irregular ctx q 3-7 mins    SVE: 2/50/-3, cervix posterior    - reactive tracing  - unchanged cervical exam  - continue current management, plan for IOL tomorrow at midnight    Brooklyn Rivera MD   OBGYN, PGY-3

## 2020-08-09 PROCEDURE — 59025 PR FETAL 2N-STRESS TEST: ICD-10-PCS | Mod: 26,,, | Performed by: OBSTETRICS & GYNECOLOGY

## 2020-08-09 PROCEDURE — 11000001 HC ACUTE MED/SURG PRIVATE ROOM

## 2020-08-09 PROCEDURE — 99232 PR SUBSEQUENT HOSPITAL CARE,LEVL II: ICD-10-PCS | Mod: 25,,, | Performed by: OBSTETRICS & GYNECOLOGY

## 2020-08-09 PROCEDURE — 99232 SBSQ HOSP IP/OBS MODERATE 35: CPT | Mod: 25,,, | Performed by: OBSTETRICS & GYNECOLOGY

## 2020-08-09 PROCEDURE — 63600175 PHARM REV CODE 636 W HCPCS

## 2020-08-09 PROCEDURE — 59025 FETAL NON-STRESS TEST: CPT | Mod: 26,,, | Performed by: OBSTETRICS & GYNECOLOGY

## 2020-08-09 PROCEDURE — 25000003 PHARM REV CODE 250: Performed by: STUDENT IN AN ORGANIZED HEALTH CARE EDUCATION/TRAINING PROGRAM

## 2020-08-09 RX ORDER — HYDROCODONE BITARTRATE AND ACETAMINOPHEN 500; 5 MG/1; MG/1
TABLET ORAL
Status: DISCONTINUED | OUTPATIENT
Start: 2020-08-09 | End: 2020-08-10

## 2020-08-09 RX ADMIN — MINERAL SUPPLEMENT IRON 300 MG / 5 ML STRENGTH LIQUID 100 PER BOX UNFLAVORED 300 MG: at 08:08

## 2020-08-09 RX ADMIN — MINERAL SUPPLEMENT IRON 300 MG / 5 ML STRENGTH LIQUID 100 PER BOX UNFLAVORED 300 MG: at 09:08

## 2020-08-09 NOTE — SUBJECTIVE & OBJECTIVE
Obstetric HPI:  Patient reports irregular, infrequent contractions, active fetal movement x 2, absent vaginal bleeding, small loss of fluid.      Objective:     Vital Signs (Most Recent):  Temp: 98.2 °F (36.8 °C) (08/08/20 2332)  Pulse: 100 (08/08/20 2332)  Resp: 16 (08/08/20 2332)  BP: (!) 102/56 (08/08/20 2332)  SpO2: 99 % (08/08/20 2332) Vital Signs (24h Range):  Temp:  [97.5 °F (36.4 °C)-98.6 °F (37 °C)] 98.2 °F (36.8 °C)  Pulse:  [] 100  Resp:  [16-18] 16  SpO2:  [98 %-100 %] 99 %  BP: ()/(51-56) 102/56     Weight: 50.8 kg (112 lb)  Body mass index is 21.87 kg/m².    NST reactive, reassuring overnight    No intake or output data in the 24 hours ending 08/09/20 0622  Significant Labs:  Recent Lab Results     None          Physical Exam:   Constitutional: She is oriented to person, place, and time. She appears well-developed and well-nourished. No distress.    HENT:   Head: Normocephalic and atraumatic.    Eyes: Pupils are equal, round, and reactive to light. EOM are normal.    Neck: Normal range of motion.    Cardiovascular: Normal rate and regular rhythm.     Pulmonary/Chest: Effort normal.   Normal work of breathing          Abdominal: Soft. She exhibits no distension. There is no abdominal tenderness. There is no rebound and no guarding.   Gravid uterus, appropriate for gestational age/twin pregnancy             Musculoskeletal: Normal range of motion. No tenderness or edema.       Neurological: She is alert and oriented to person, place, and time.    Skin: Skin is warm and dry.    Psychiatric: She has a normal mood and affect.

## 2020-08-09 NOTE — ASSESSMENT & PLAN NOTE
- Consents for Delivery and Blood signed  - BMZ course complete -  - GBS neg  - Monitor fetal status closely    - fetal position confirmed as cephalic/breech   - normal MVP on limited ultrasound   - FHT reactive and reassuring, TOCO ctx [id number] with baseline uterine irritability  - Last US on : EFW was A: 1340 (8th percent), B 1427 g (11%)  - As EFW now likely > 1500 and given GA, DARRELL may be considered if OB provider in house is comfortable with breech extraction, risks discussed with patient, if not, would be for  at 34w  - Diet Regular, NST BID

## 2020-08-09 NOTE — PROGRESS NOTES
[facility]  Obstetrics  Antepartum Progress Note    Patient Name: Fany May  MRN: [id number]  Admission Date: 2020  Hospital Length of Stay: 17 days  Attending Physician: Marcos Hudson MD  Primary Care Provider: Primary Doctor No    Subjective:     Principal Problem: premature rupture of membranes (PPROM) with unknown onset of labor    HPI:  Fany May is a 25 y.o. [id number] at 31w4d with di-di twin pregnancy who presents from [facility] for concern for rupture of membranes. Patient endorses loss of fluid since , when she presented to the [facility] and had a negative rupture exam, bedside ultrasound demonstrated membrane with fluid pockets on both sides. She also endorses contractions that have increased in frequency and intensity. Denies vaginal bleeding,   Fetal Movement: normal.     MFM: BPP of 8/8 for A and B. Multiple sites of sac separation between twin A and twin B were found on MFM ultrasound concerning for chorioamniotic membrane separation and PPROM.     With MFM ultrasound and given patient's self described concerns about PPROM, this fits known data   concerning pPROM of one or both sacs confined by membranes.     Pregnancy also complicated by FGR 8th percentile A , B 11%.        Hospital Course:  2020 Overnight patient endorsed increased contractions with pain increasing from 3 to 5. Patient cervix was re-checked and found to be unchanged. Since then, the patient's contractions have spaced out to q10-20 minutes. Patient is still feeling them and rating them at 5/10 pain. Continues to endorse trickling of fluid. Denies vaginal bleeding. Endorses fetal movement.  2020 Called to patient's room once overnight. Placed on FHT for 1 hour, no contractions noted on monitor. Called out again for a different constant abdominal pain. SSE performed, cervix visually unchanged 1-2 cm, white discharge noted, but no vaginal pooling or fluid noted. Pain relieved by  "tylenol. Continues to endorse trickling of fluid. Denies vaginal bleeding. Endorses fetal movement.  07/26/2020 NSTs reassuring yesterday; patient denies any pain and did not experience any pain yesterday; endorses fetal movement; no bleeding; no contractions  07/27/2020 NST reassuring yesterday. Pt reports continued minimal leakage of clear fluid, worse when standing or with fetal movement. She complains of burning epigastric pain, worse when laying down. No vaginal bleeding, no contractions.  07/28/2020 NST reassuring yesterday. Patient reports continued minimal leakage of clear fluid, worse when standing & ambulating. Epigastric pain has improved with PPI. Feeling abdominal "tightening" 2 times per day, no regular contractions, no vaginal bleeding, endorses regular fetal movement. Pt complains of occasional mild HA, improves spontaneously. Denies visual changes, CP, SOB, RUQ pain.  07/29/2020 NST reassuring yesterday. Patient feels leakage of fluid has improved. Epigastric pain and HA have resolved. Still feeling abdominal tightening and cramping intermittently, unable to specify frequency, was able to sleep through the night. One episode of increased pelvic pressure at 2000, spec exam was unchanged 2/60/-3. Patient feeling very down about staying in the hospital for an extended period.   07/30/2020 NST reassuring yesterday. Patient feels leakage of fluid continues to decrease but still present when standing. Complains of intermittent pelvic pressure, worsening over past 12 hours. Patient denying symptoms of depression and declines SSRI at this time.  07/31/2020 Patient felt increasing pelvic pressure last night & placed on monitor, FHT was reactive & reassuring, TOCO with ctx q28-83cvt. No abdominal pain/pressure at present. Patient still endorsing mild leakage of fluid. Still having epigastric burning with PO intake, unable to tolerate liquid famotidine but unwilling to try to swallow pills at this " time.  08/01/2020: NST reactive, reassuring. Continues to endorse feeling LOF at times. Transfused 1u pRBC for anemia  08/02/2020 NSTreactive reassuring x 2 overnight. Had one episode of vaginal spotting yesterday. Negative speculum exam. Continuous monitoring reassuring.   08/03/2020-08/07/2020     NST r/r. BMZ and latency abx completed. No complaints apart from slow LOF. Continue course.   08/08/2020- Pt complaining of mild pain with ctx on morning NST, toco with ctx q3-6 mins. SVE 2/60/-3, cervix posterior. PM NST reactive and reassuring, ctx q10 mins.  08/09/2020 - No issues overnight. Pt reports irregular, infrequent ctx. Good FM. Denies vaginal bleeding.    Obstetric HPI:  Patient reports irregular, infrequent contractions, active fetal movement x 2, absent vaginal bleeding, small loss of fluid.      Objective:     Vital Signs (Most Recent):  Temp: 98.2 °F (36.8 °C) (08/08/20 2332)  Pulse: 100 (08/08/20 2332)  Resp: 16 (08/08/20 2332)  BP: (!) 102/56 (08/08/20 2332)  SpO2: 99 % (08/08/20 2332) Vital Signs (24h Range):  Temp:  [97.5 °F (36.4 °C)-98.6 °F (37 °C)] 98.2 °F (36.8 °C)  Pulse:  [] 100  Resp:  [16-18] 16  SpO2:  [98 %-100 %] 99 %  BP: ()/(51-56) 102/56     Weight: 50.8 kg (112 lb)  Body mass index is 21.87 kg/m².    NST reactive, reassuring overnight    No intake or output data in the 24 hours ending 08/09/20 0622  Significant Labs:  Recent Lab Results     None          Physical Exam:   Constitutional: She is oriented to person, place, and time. She appears well-developed and well-nourished. No distress.    HENT:   Head: Normocephalic and atraumatic.    Eyes: Pupils are equal, round, and reactive to light. EOM are normal.    Neck: Normal range of motion.    Cardiovascular: Normal rate and regular rhythm.     Pulmonary/Chest: Effort normal.   Normal work of breathing          Abdominal: Soft. She exhibits no distension. There is no abdominal tenderness. There is no rebound and no guarding.    Gravid uterus, appropriate for gestational age/twin pregnancy             Musculoskeletal: Normal range of motion. No tenderness or edema.       Neurological: She is alert and oriented to person, place, and time.    Skin: Skin is warm and dry.    Psychiatric: She has a normal mood and affect.       Assessment/Plan:     25 y.o. female  at 34w0d for:    *  premature rupture of membranes (PPROM) with unknown onset of labor  -: presented to the [facility] with concern for gush of fluid, negative rupture exam, bedside ultrasound demonstrated two pockets of fluid across the visible membrane in both A and B MVP > 2  -: presented to MFM appointment complaining of continued trickling since  clear fluid  -: MFM ultrasound demonstrated multiple sites of sac separation in this US, concerning for CMS (chorioamniotic membrane separation). Given patient's self described concerns about PPROM (see note from OB ED on ), this fits known data concerning PPROM of one or both sacs confined by membranes In [facility], rupture exam negative, bedside ultrasound revealed same pocket of fluid between the two amniotic membranes  - now s/p 10 days latency antibiotics  - continue to monitor closely for signs of infection   - Expectant management until 34 wga  - Daily NST BID continue to be reactive, reassuring  - Plan for early Monday morning initiation of IOL    Elevated glucose tolerance test  -Failed one hour glucose screen  -Pt had BS trended for 7d; <50%   -Does not meet criteria for GDM    31 weeks gestation of pregnancy  - Consents for Delivery and Blood signed  - BMZ course complete -  - GBS neg  - Monitor fetal status closely    - fetal position confirmed as cephalic/breech   - normal MVP on limited ultrasound   - FHT reactive and reassuring, TOCO ctx [id number] with baseline uterine irritability  - Last US on : EFW was A: 1340 (8th percent), B 1427 g (11%)  - As EFW now likely > 1500 and given  GA, DARRELL may be considered if OB provider in house is comfortable with breech extraction, risks discussed with patient, if not, would be for  at 34w  - Diet Regular, NST BID      Iron deficiency anemia  - H/H on admit ..7  - Iron studies confirmed Fe def anemia  -  H/H  s/p 1u pRBCs, most recent H/h   - will hold blood for delivery given increased risk for hemorrhage with multiples    Desires immediate post-placental Mirena  - Medicaid  - will order if patient proceeds with delivery    Sickle cell trait                  Brooklyn Rivera MD  Obstetrics  [facility]

## 2020-08-09 NOTE — ASSESSMENT & PLAN NOTE
- H/H on admit 8.2/26.7  - Iron studies confirmed Fe def anemia  - 7/31 H/H 7/23 s/p 1u pRBCs, most recent H/h 8/26  - will hold blood for delivery given increased risk for hemorrhage with multiples

## 2020-08-09 NOTE — PLAN OF CARE
Patient doing well; no acute changes or complaints throughout the night. VS stable and afebrile. Patient reports +FM x2 and continuing to leak clear fluid. Denies vaginal bleeding or contractions. Patient denies headache, blurry vision, or RUQ pain.     Plan of care reviewed with patient. All questions and concerns addressed at this time.

## 2020-08-10 PROBLEM — Z98.891 STATUS POST PRIMARY LOW TRANSVERSE CESAREAN SECTION: Status: ACTIVE | Noted: 2020-08-10

## 2020-08-10 LAB
ABO + RH BLD: NORMAL
BASOPHILS # BLD AUTO: ABNORMAL K/UL (ref 0–0.2)
BASOPHILS NFR BLD: 1 % (ref 0–1.9)
BLD GP AB SCN CELLS X3 SERPL QL: NORMAL
DIFFERENTIAL METHOD: ABNORMAL
EOSINOPHIL # BLD AUTO: ABNORMAL K/UL (ref 0–0.5)
EOSINOPHIL NFR BLD: 0 % (ref 0–8)
ERYTHROCYTE [DISTWIDTH] IN BLOOD BY AUTOMATED COUNT: 23.5 % (ref 11.5–14.5)
HCT VFR BLD AUTO: 30.2 % (ref 37–48.5)
HGB BLD-MCNC: 9.5 G/DL (ref 12–16)
IMM GRANULOCYTES # BLD AUTO: ABNORMAL K/UL (ref 0–0.04)
IMM GRANULOCYTES NFR BLD AUTO: ABNORMAL % (ref 0–0.5)
LYMPHOCYTES # BLD AUTO: ABNORMAL K/UL (ref 1–4.8)
LYMPHOCYTES NFR BLD: 23 % (ref 18–48)
MCH RBC QN AUTO: 23.1 PG (ref 27–31)
MCHC RBC AUTO-ENTMCNC: 31.5 G/DL (ref 32–36)
MCV RBC AUTO: 74 FL (ref 82–98)
MONOCYTES # BLD AUTO: ABNORMAL K/UL (ref 0.3–1)
MONOCYTES NFR BLD: 8 % (ref 4–15)
NEUTROPHILS NFR BLD: 68 % (ref 38–73)
NRBC BLD-RTO: 0 /100 WBC
PLATELET # BLD AUTO: 145 K/UL (ref 150–350)
PMV BLD AUTO: 10.5 FL (ref 9.2–12.9)
RBC # BLD AUTO: 4.11 M/UL (ref 4–5.4)
WBC # BLD AUTO: 10.06 K/UL (ref 3.9–12.7)

## 2020-08-10 PROCEDURE — 36415 COLL VENOUS BLD VENIPUNCTURE: CPT

## 2020-08-10 PROCEDURE — 59514 PRA REAN DELIVERY ONLY: ICD-10-PCS | Mod: AA,,, | Performed by: ANESTHESIOLOGY

## 2020-08-10 PROCEDURE — 63600175 PHARM REV CODE 636 W HCPCS: Performed by: STUDENT IN AN ORGANIZED HEALTH CARE EDUCATION/TRAINING PROGRAM

## 2020-08-10 PROCEDURE — 72100002 HC LABOR CARE, 1ST 8 HOURS

## 2020-08-10 PROCEDURE — 25000003 PHARM REV CODE 250: Performed by: STUDENT IN AN ORGANIZED HEALTH CARE EDUCATION/TRAINING PROGRAM

## 2020-08-10 PROCEDURE — 36004725 HC OB OR TIME LEV III - EA ADD 15 MIN: Performed by: OBSTETRICS & GYNECOLOGY

## 2020-08-10 PROCEDURE — 25000003 PHARM REV CODE 250: Performed by: ANESTHESIOLOGY

## 2020-08-10 PROCEDURE — 27200710 HC EPIDURAL INFUSION PUMP SET: Performed by: ANESTHESIOLOGY

## 2020-08-10 PROCEDURE — 85027 COMPLETE CBC AUTOMATED: CPT

## 2020-08-10 PROCEDURE — 01968 PR INSERT CATH,ART,PERCUT,SHORTTERM: ICD-10-PCS | Mod: AA,,, | Performed by: ANESTHESIOLOGY

## 2020-08-10 PROCEDURE — 59514 CESAREAN DELIVERY ONLY: CPT | Mod: 22,AT,, | Performed by: OBSTETRICS & GYNECOLOGY

## 2020-08-10 PROCEDURE — 85007 BL SMEAR W/DIFF WBC COUNT: CPT

## 2020-08-10 PROCEDURE — S0028 INJECTION, FAMOTIDINE, 20 MG: HCPCS

## 2020-08-10 PROCEDURE — 59514 CESAREAN DELIVERY ONLY: CPT | Mod: AA,,, | Performed by: ANESTHESIOLOGY

## 2020-08-10 PROCEDURE — 71000033 HC RECOVERY, INTIAL HOUR: Performed by: OBSTETRICS & GYNECOLOGY

## 2020-08-10 PROCEDURE — 25000003 PHARM REV CODE 250

## 2020-08-10 PROCEDURE — 01968 ANES/ANALG CS DLVR NEURAXIAL: CPT | Mod: AA,,, | Performed by: ANESTHESIOLOGY

## 2020-08-10 PROCEDURE — C1751 CATH, INF, PER/CENT/MIDLINE: HCPCS | Performed by: ANESTHESIOLOGY

## 2020-08-10 PROCEDURE — 37000009 HC ANESTHESIA EA ADD 15 MINS: Performed by: OBSTETRICS & GYNECOLOGY

## 2020-08-10 PROCEDURE — 11000001 HC ACUTE MED/SURG PRIVATE ROOM

## 2020-08-10 PROCEDURE — 36004724 HC OB OR TIME LEV III - 1ST 15 MIN: Performed by: OBSTETRICS & GYNECOLOGY

## 2020-08-10 PROCEDURE — 63600175 PHARM REV CODE 636 W HCPCS: Performed by: ANESTHESIOLOGY

## 2020-08-10 PROCEDURE — 59514 PR CESAREAN DELIVERY ONLY: ICD-10-PCS | Mod: 22,AT,, | Performed by: OBSTETRICS & GYNECOLOGY

## 2020-08-10 PROCEDURE — 37000008 HC ANESTHESIA 1ST 15 MINUTES: Performed by: OBSTETRICS & GYNECOLOGY

## 2020-08-10 PROCEDURE — 62326 NJX INTERLAMINAR LMBR/SAC: CPT | Performed by: STUDENT IN AN ORGANIZED HEALTH CARE EDUCATION/TRAINING PROGRAM

## 2020-08-10 PROCEDURE — 86901 BLOOD TYPING SEROLOGIC RH(D): CPT

## 2020-08-10 PROCEDURE — 71000039 HC RECOVERY, EACH ADD'L HOUR: Performed by: OBSTETRICS & GYNECOLOGY

## 2020-08-10 RX ORDER — ONDANSETRON 8 MG/1
8 TABLET, ORALLY DISINTEGRATING ORAL EVERY 8 HOURS PRN
Status: DISCONTINUED | OUTPATIENT
Start: 2020-08-11 | End: 2020-08-12 | Stop reason: HOSPADM

## 2020-08-10 RX ORDER — OXYTOCIN/RINGER'S LACTATE 30/500 ML
334 PLASTIC BAG, INJECTION (ML) INTRAVENOUS ONCE
Status: DISCONTINUED | OUTPATIENT
Start: 2020-08-10 | End: 2020-08-10

## 2020-08-10 RX ORDER — NAPROXEN 500 MG/1
500 TABLET ORAL EVERY 8 HOURS
Status: DISCONTINUED | OUTPATIENT
Start: 2020-08-12 | End: 2020-08-10

## 2020-08-10 RX ORDER — MUPIROCIN 20 MG/G
OINTMENT TOPICAL 2 TIMES DAILY
Status: DISCONTINUED | OUTPATIENT
Start: 2020-08-10 | End: 2020-08-12 | Stop reason: HOSPADM

## 2020-08-10 RX ORDER — OXYTOCIN/RINGER'S LACTATE 30/500 ML
95 PLASTIC BAG, INJECTION (ML) INTRAVENOUS ONCE
Status: DISCONTINUED | OUTPATIENT
Start: 2020-08-10 | End: 2020-08-12 | Stop reason: HOSPADM

## 2020-08-10 RX ORDER — LIDOCAINE HCL/EPINEPHRINE/PF 2%-1:200K
VIAL (ML) INJECTION
Status: DISCONTINUED | OUTPATIENT
Start: 2020-08-10 | End: 2020-08-10

## 2020-08-10 RX ORDER — PRENATAL WITH FERROUS FUM AND FOLIC ACID 3080; 920; 120; 400; 22; 1.84; 3; 20; 10; 1; 12; 200; 27; 25; 2 [IU]/1; [IU]/1; MG/1; [IU]/1; MG/1; MG/1; MG/1; MG/1; MG/1; MG/1; UG/1; MG/1; MG/1; MG/1; MG/1
1 TABLET ORAL DAILY
Status: DISCONTINUED | OUTPATIENT
Start: 2020-08-11 | End: 2020-08-12 | Stop reason: HOSPADM

## 2020-08-10 RX ORDER — BISACODYL 10 MG
10 SUPPOSITORY, RECTAL RECTAL ONCE AS NEEDED
Status: DISCONTINUED | OUTPATIENT
Start: 2020-08-10 | End: 2020-08-12 | Stop reason: HOSPADM

## 2020-08-10 RX ORDER — SIMETHICONE 80 MG
1 TABLET,CHEWABLE ORAL EVERY 6 HOURS PRN
Status: DISCONTINUED | OUTPATIENT
Start: 2020-08-10 | End: 2020-08-12 | Stop reason: HOSPADM

## 2020-08-10 RX ORDER — OXYTOCIN 10 [USP'U]/ML
INJECTION, SOLUTION INTRAMUSCULAR; INTRAVENOUS
Status: DISCONTINUED | OUTPATIENT
Start: 2020-08-10 | End: 2020-08-10

## 2020-08-10 RX ORDER — IBUPROFEN 400 MG/1
800 TABLET ORAL EVERY 8 HOURS
Status: DISCONTINUED | OUTPATIENT
Start: 2020-08-11 | End: 2020-08-12 | Stop reason: HOSPADM

## 2020-08-10 RX ORDER — BUPIVACAINE HYDROCHLORIDE 2.5 MG/ML
INJECTION, SOLUTION EPIDURAL; INFILTRATION; INTRACAUDAL
Status: DISPENSED
Start: 2020-08-10 | End: 2020-08-11

## 2020-08-10 RX ORDER — OXYCODONE HYDROCHLORIDE 5 MG/1
10 TABLET ORAL EVERY 4 HOURS PRN
Status: CANCELLED | OUTPATIENT
Start: 2020-08-10 | End: 2020-08-11

## 2020-08-10 RX ORDER — OXYCODONE HYDROCHLORIDE 5 MG/1
5 TABLET ORAL EVERY 4 HOURS PRN
Status: CANCELLED | OUTPATIENT
Start: 2020-08-10 | End: 2020-08-11

## 2020-08-10 RX ORDER — OXYCODONE AND ACETAMINOPHEN 10; 325 MG/1; MG/1
1 TABLET ORAL EVERY 4 HOURS PRN
Status: DISCONTINUED | OUTPATIENT
Start: 2020-08-11 | End: 2020-08-12 | Stop reason: HOSPADM

## 2020-08-10 RX ORDER — FENTANYL/BUPIVACAINE/NS/PF 2MCG/ML-.1
PLASTIC BAG, INJECTION (ML) INJECTION
Status: COMPLETED
Start: 2020-08-10 | End: 2020-08-10

## 2020-08-10 RX ORDER — SODIUM CITRATE AND CITRIC ACID MONOHYDRATE 334; 500 MG/5ML; MG/5ML
SOLUTION ORAL
Status: COMPLETED
Start: 2020-08-10 | End: 2020-08-10

## 2020-08-10 RX ORDER — FENTANYL CITRATE 50 UG/ML
INJECTION, SOLUTION INTRAMUSCULAR; INTRAVENOUS
Status: COMPLETED
Start: 2020-08-10 | End: 2020-08-10

## 2020-08-10 RX ORDER — DIPHENHYDRAMINE HYDROCHLORIDE 50 MG/ML
12.5 INJECTION INTRAMUSCULAR; INTRAVENOUS NIGHTLY PRN
Status: CANCELLED | OUTPATIENT
Start: 2020-08-10 | End: 2020-08-11

## 2020-08-10 RX ORDER — CEFAZOLIN SODIUM 1 G/3ML
INJECTION, POWDER, FOR SOLUTION INTRAMUSCULAR; INTRAVENOUS
Status: DISCONTINUED | OUTPATIENT
Start: 2020-08-10 | End: 2020-08-10

## 2020-08-10 RX ORDER — SODIUM CHLORIDE, SODIUM LACTATE, POTASSIUM CHLORIDE, CALCIUM CHLORIDE 600; 310; 30; 20 MG/100ML; MG/100ML; MG/100ML; MG/100ML
INJECTION, SOLUTION INTRAVENOUS CONTINUOUS PRN
Status: DISCONTINUED | OUTPATIENT
Start: 2020-08-10 | End: 2020-08-10

## 2020-08-10 RX ORDER — OXYCODONE AND ACETAMINOPHEN 5; 325 MG/1; MG/1
1 TABLET ORAL EVERY 4 HOURS PRN
Status: DISCONTINUED | OUTPATIENT
Start: 2020-08-11 | End: 2020-08-12 | Stop reason: HOSPADM

## 2020-08-10 RX ORDER — KETOROLAC TROMETHAMINE 30 MG/ML
30 INJECTION, SOLUTION INTRAMUSCULAR; INTRAVENOUS EVERY 6 HOURS
Status: DISCONTINUED | OUTPATIENT
Start: 2020-08-11 | End: 2020-08-10

## 2020-08-10 RX ORDER — DIPHENHYDRAMINE HCL 25 MG
25 CAPSULE ORAL EVERY 4 HOURS PRN
Status: DISCONTINUED | OUTPATIENT
Start: 2020-08-10 | End: 2020-08-12 | Stop reason: HOSPADM

## 2020-08-10 RX ORDER — FAMOTIDINE 10 MG/ML
20 INJECTION INTRAVENOUS ONCE
Status: DISCONTINUED | OUTPATIENT
Start: 2020-08-10 | End: 2020-08-10

## 2020-08-10 RX ORDER — FENTANYL/BUPIVACAINE/NS/PF 2MCG/ML-.1
PLASTIC BAG, INJECTION (ML) INJECTION CONTINUOUS
Status: DISCONTINUED | OUTPATIENT
Start: 2020-08-10 | End: 2020-08-10

## 2020-08-10 RX ORDER — ONDANSETRON 8 MG/1
8 TABLET, ORALLY DISINTEGRATING ORAL EVERY 8 HOURS PRN
Status: DISCONTINUED | OUTPATIENT
Start: 2020-08-10 | End: 2020-08-10

## 2020-08-10 RX ORDER — FENTANYL/BUPIVACAINE/NS/PF 2MCG/ML-.1
PLASTIC BAG, INJECTION (ML) INJECTION CONTINUOUS PRN
Status: DISCONTINUED | OUTPATIENT
Start: 2020-08-10 | End: 2020-08-10

## 2020-08-10 RX ORDER — CALCIUM CARBONATE 200(500)MG
500 TABLET,CHEWABLE ORAL 3 TIMES DAILY PRN
Status: DISCONTINUED | OUTPATIENT
Start: 2020-08-10 | End: 2020-08-10

## 2020-08-10 RX ORDER — BUPIVACAINE HYDROCHLORIDE 2.5 MG/ML
INJECTION, SOLUTION EPIDURAL; INFILTRATION; INTRACAUDAL
Status: COMPLETED
Start: 2020-08-10 | End: 2020-08-10

## 2020-08-10 RX ORDER — AMOXICILLIN 250 MG
1 CAPSULE ORAL NIGHTLY PRN
Status: DISCONTINUED | OUTPATIENT
Start: 2020-08-10 | End: 2020-08-12 | Stop reason: HOSPADM

## 2020-08-10 RX ORDER — SODIUM CHLORIDE, SODIUM LACTATE, POTASSIUM CHLORIDE, CALCIUM CHLORIDE 600; 310; 30; 20 MG/100ML; MG/100ML; MG/100ML; MG/100ML
INJECTION, SOLUTION INTRAVENOUS CONTINUOUS
Status: DISCONTINUED | OUTPATIENT
Start: 2020-08-10 | End: 2020-08-10

## 2020-08-10 RX ORDER — KETOROLAC TROMETHAMINE 30 MG/ML
30 INJECTION, SOLUTION INTRAMUSCULAR; INTRAVENOUS EVERY 6 HOURS
Status: DISPENSED | OUTPATIENT
Start: 2020-08-10 | End: 2020-08-11

## 2020-08-10 RX ORDER — BUPIVACAINE HYDROCHLORIDE 2.5 MG/ML
INJECTION, SOLUTION EPIDURAL; INFILTRATION; INTRACAUDAL
Status: DISCONTINUED | OUTPATIENT
Start: 2020-08-10 | End: 2020-08-10

## 2020-08-10 RX ORDER — HYDROCORTISONE 25 MG/G
CREAM TOPICAL 3 TIMES DAILY PRN
Status: DISCONTINUED | OUTPATIENT
Start: 2020-08-10 | End: 2020-08-12 | Stop reason: HOSPADM

## 2020-08-10 RX ORDER — LIDOCAINE HYDROCHLORIDE AND EPINEPHRINE 15; 5 MG/ML; UG/ML
INJECTION, SOLUTION EPIDURAL
Status: DISCONTINUED | OUTPATIENT
Start: 2020-08-10 | End: 2020-08-10

## 2020-08-10 RX ORDER — OXYTOCIN/RINGER'S LACTATE 30/500 ML
2 PLASTIC BAG, INJECTION (ML) INTRAVENOUS CONTINUOUS
Status: DISCONTINUED | OUTPATIENT
Start: 2020-08-10 | End: 2020-08-10

## 2020-08-10 RX ORDER — DOCUSATE SODIUM 100 MG/1
200 CAPSULE, LIQUID FILLED ORAL 2 TIMES DAILY
Status: DISCONTINUED | OUTPATIENT
Start: 2020-08-10 | End: 2020-08-12 | Stop reason: HOSPADM

## 2020-08-10 RX ORDER — ACETAMINOPHEN 10 MG/ML
INJECTION, SOLUTION INTRAVENOUS
Status: DISCONTINUED | OUTPATIENT
Start: 2020-08-10 | End: 2020-08-10

## 2020-08-10 RX ORDER — DEXAMETHASONE SODIUM PHOSPHATE 4 MG/ML
INJECTION, SOLUTION INTRA-ARTICULAR; INTRALESIONAL; INTRAMUSCULAR; INTRAVENOUS; SOFT TISSUE
Status: DISCONTINUED | OUTPATIENT
Start: 2020-08-10 | End: 2020-08-10

## 2020-08-10 RX ORDER — FENTANYL CITRATE 50 UG/ML
INJECTION, SOLUTION INTRAMUSCULAR; INTRAVENOUS
Status: DISCONTINUED | OUTPATIENT
Start: 2020-08-10 | End: 2020-08-10

## 2020-08-10 RX ORDER — OXYTOCIN/RINGER'S LACTATE 30/500 ML
95 PLASTIC BAG, INJECTION (ML) INTRAVENOUS ONCE
Status: DISCONTINUED | OUTPATIENT
Start: 2020-08-10 | End: 2020-08-10

## 2020-08-10 RX ORDER — BUPIVACAINE HYDROCHLORIDE 2.5 MG/ML
INJECTION, SOLUTION EPIDURAL; INFILTRATION; INTRACAUDAL
Status: DISPENSED
Start: 2020-08-10 | End: 2020-08-10

## 2020-08-10 RX ORDER — ONDANSETRON 2 MG/ML
INJECTION INTRAMUSCULAR; INTRAVENOUS
Status: DISCONTINUED | OUTPATIENT
Start: 2020-08-10 | End: 2020-08-10

## 2020-08-10 RX ORDER — ADHESIVE BANDAGE
30 BANDAGE TOPICAL 2 TIMES DAILY PRN
Status: DISCONTINUED | OUTPATIENT
Start: 2020-08-11 | End: 2020-08-12 | Stop reason: HOSPADM

## 2020-08-10 RX ORDER — SODIUM CITRATE AND CITRIC ACID MONOHYDRATE 334; 500 MG/5ML; MG/5ML
30 SOLUTION ORAL ONCE
Status: DISCONTINUED | OUTPATIENT
Start: 2020-08-10 | End: 2020-08-12 | Stop reason: HOSPADM

## 2020-08-10 RX ORDER — PHENYLEPHRINE HYDROCHLORIDE 10 MG/ML
INJECTION INTRAVENOUS
Status: DISCONTINUED | OUTPATIENT
Start: 2020-08-10 | End: 2020-08-10

## 2020-08-10 RX ORDER — CEFAZOLIN SODIUM 2 G/50ML
2 SOLUTION INTRAVENOUS ONCE AS NEEDED
Status: DISCONTINUED | OUTPATIENT
Start: 2020-08-10 | End: 2020-08-10

## 2020-08-10 RX ORDER — SODIUM CHLORIDE 9 MG/ML
INJECTION, SOLUTION INTRAVENOUS
Status: DISCONTINUED | OUTPATIENT
Start: 2020-08-10 | End: 2020-08-10

## 2020-08-10 RX ORDER — SIMETHICONE 80 MG
1 TABLET,CHEWABLE ORAL 4 TIMES DAILY PRN
Status: DISCONTINUED | OUTPATIENT
Start: 2020-08-10 | End: 2020-08-10

## 2020-08-10 RX ORDER — FAMOTIDINE 10 MG/ML
INJECTION INTRAVENOUS
Status: COMPLETED
Start: 2020-08-10 | End: 2020-08-10

## 2020-08-10 RX ORDER — MORPHINE SULFATE 0.5 MG/ML
INJECTION, SOLUTION EPIDURAL; INTRATHECAL; INTRAVENOUS
Status: DISCONTINUED | OUTPATIENT
Start: 2020-08-10 | End: 2020-08-10

## 2020-08-10 RX ADMIN — Medication 10 ML/HR: at 06:08

## 2020-08-10 RX ADMIN — KETOROLAC TROMETHAMINE 30 MG: 30 INJECTION, SOLUTION INTRAMUSCULAR at 08:08

## 2020-08-10 RX ADMIN — LIDOCAINE HYDROCHLORIDE,EPINEPHRINE BITARTRATE 3 ML: 15; .005 INJECTION, SOLUTION EPIDURAL; INFILTRATION; INTRACAUDAL; PERINEURAL at 06:08

## 2020-08-10 RX ADMIN — ACETAMINOPHEN 1000 MG: 10 INJECTION, SOLUTION INTRAVENOUS at 06:08

## 2020-08-10 RX ADMIN — LIDOCAINE HYDROCHLORIDE,EPINEPHRINE BITARTRATE 5 ML: 20; .005 INJECTION, SOLUTION EPIDURAL; INFILTRATION; INTRACAUDAL; PERINEURAL at 05:08

## 2020-08-10 RX ADMIN — PHENYLEPHRINE HYDROCHLORIDE 100 MCG: 10 INJECTION INTRAVENOUS at 05:08

## 2020-08-10 RX ADMIN — OXYTOCIN 5 UNITS: 10 INJECTION, SOLUTION INTRAMUSCULAR; INTRAVENOUS at 06:08

## 2020-08-10 RX ADMIN — Medication 1.5 MG: at 05:08

## 2020-08-10 RX ADMIN — LIDOCAINE HYDROCHLORIDE,EPINEPHRINE BITARTRATE 3 ML: 15; .005 INJECTION, SOLUTION EPIDURAL; INFILTRATION; INTRACAUDAL; PERINEURAL at 05:08

## 2020-08-10 RX ADMIN — FAMOTIDINE 20 MG: 10 INJECTION, SOLUTION INTRAVENOUS at 04:08

## 2020-08-10 RX ADMIN — SODIUM CHLORIDE, SODIUM LACTATE, POTASSIUM CHLORIDE, AND CALCIUM CHLORIDE: .6; .31; .03; .02 INJECTION, SOLUTION INTRAVENOUS at 04:08

## 2020-08-10 RX ADMIN — OXYTOCIN 5 UNITS: 10 INJECTION, SOLUTION INTRAMUSCULAR; INTRAVENOUS at 05:08

## 2020-08-10 RX ADMIN — Medication 4 ML: at 06:08

## 2020-08-10 RX ADMIN — FENTANYL CITRATE 100 MCG: 50 INJECTION, SOLUTION INTRAMUSCULAR; INTRAVENOUS at 04:08

## 2020-08-10 RX ADMIN — PHENYLEPHRINE HYDROCHLORIDE 200 MCG: 10 INJECTION INTRAVENOUS at 05:08

## 2020-08-10 RX ADMIN — FENTANYL CITRATE 100 MCG: 50 INJECTION, SOLUTION INTRAMUSCULAR; INTRAVENOUS at 06:08

## 2020-08-10 RX ADMIN — DEXAMETHASONE SODIUM PHOSPHATE 4 MG: 4 INJECTION, SOLUTION INTRAMUSCULAR; INTRAVENOUS at 05:08

## 2020-08-10 RX ADMIN — SODIUM CHLORIDE, SODIUM LACTATE, POTASSIUM CHLORIDE, AND CALCIUM CHLORIDE: 600; 310; 30; 20 INJECTION, SOLUTION INTRAVENOUS at 05:08

## 2020-08-10 RX ADMIN — LEVONORGESTREL 1 INTRA UTERINE DEVICE: 52 INTRAUTERINE DEVICE INTRAUTERINE at 05:08

## 2020-08-10 RX ADMIN — MINERAL SUPPLEMENT IRON 300 MG / 5 ML STRENGTH LIQUID 100 PER BOX UNFLAVORED 300 MG: at 09:08

## 2020-08-10 RX ADMIN — Medication 3 ML: at 04:08

## 2020-08-10 RX ADMIN — CEFAZOLIN 2 G: 330 INJECTION, POWDER, FOR SOLUTION INTRAMUSCULAR; INTRAVENOUS at 05:08

## 2020-08-10 RX ADMIN — Medication 2 MILLI-UNITS/MIN: at 04:08

## 2020-08-10 RX ADMIN — SODIUM CITRATE AND CITRIC ACID MONOHYDRATE 30 ML: 500; 334 SOLUTION ORAL at 04:08

## 2020-08-10 RX ADMIN — AZITHROMYCIN MONOHYDRATE 500 MG: 500 INJECTION, POWDER, LYOPHILIZED, FOR SOLUTION INTRAVENOUS at 04:08

## 2020-08-10 RX ADMIN — PHENYLEPHRINE HYDROCHLORIDE 200 MCG: 10 INJECTION INTRAVENOUS at 06:08

## 2020-08-10 RX ADMIN — BUPIVACAINE HYDROCHLORIDE 5 ML: 2.5 INJECTION, SOLUTION EPIDURAL; INFILTRATION; INTRACAUDAL; PERINEURAL at 04:08

## 2020-08-10 RX ADMIN — ONDANSETRON HYDROCHLORIDE 4 MG: 2 INJECTION INTRAMUSCULAR; INTRAVENOUS at 05:08

## 2020-08-10 NOTE — PROGRESS NOTES
Pt. Doing ok, and aware of 0430 induction of labor. Pt denies vaginal bleeding, but reports some leakage of fluid. Reports + fetal movement x2. Pt remained stable throughout shift. Denies further questions at this time. Currently has visitor at bedside.

## 2020-08-10 NOTE — PROGRESS NOTES
LABOR PROGRESS NOTE    S:  MD to bedside for cervical check. Complaints: pressure    O: Temp:  [97.5 °F (36.4 °C)-98.3 °F (36.8 °C)] 97.5 °F (36.4 °C)  Pulse:  [] 83  Resp:  [16] 16  SpO2:  [95 %-100 %] 100 %  BP: (81-99)/(50-63) 91/61    FHT: 120/120BPM/moderate beat to beat variability/+accels/-decels   CTX: q 2-3 minutes,     Dilation (cm): 2.5  Effacement (%): 80  Station: -2  Dose(units) Oxytocin: *12 akanksha-units/min     ASSESSMENT:   25 y.o.  at 34w1d, latent  FHT reassuring  Cat 1      TIMELINE  0400: 2/70/-3; pit started  0730: 2/70/-3  1030: 2.5/80/-3  1230: 2.5/80/-2; AROM cl  1330: 2.5/80/-2      PLAN:  1. Labor management  -Continue Close Maternal/Fetal Monitoring.   -Pitocin augmentation per protocol,   -Recheck 2 hours or PRN    JANIE Frances MD  OBGYN PGY2

## 2020-08-10 NOTE — PROGRESS NOTES
LABOR NOTE    S:  Complaints: No.  Epidural working:  yes  MD to bedside due to late decelerations. Pt recently had epidural placed. BP noted to be 80s/50s. IVF bolus already initiated. Pitocin paused. Moderate variability maintained, + accelerations. Joel q2-4 minutes.    O: BP (!) 94/58   Pulse 78   Temp 97.5 °F (36.4 °C)   Resp 16   Ht 5' (1.524 m)   Wt 50.8 kg (112 lb)   LMP 12/15/2019   SpO2 100%   Breastfeeding No   BMI 21.87 kg/m²       FHT: Twin A: baseline 125, mod variability, + accels, + late decels with some ctx, improved with d/c of pitocin and initiation of IVF bolus. Twin B baseline 135, mod variability, + accels, + late decels, also improved with bolus/stopping pitocin. Cat 2 (overall now reassuring)  CTX: q 2-4 minutes  SVE: /-3, unchanged.      ASSESSMENT:   25 y.o.  at 34w1d, IOL    FHT now reassuring    Active Hospital Problems    Diagnosis  POA    * premature rupture of membranes (PPROM) with unknown onset of labor [O42.919]  Yes    Elevated glucose tolerance test [R73.09]  No    31 weeks gestation of pregnancy [Z3A.31]  Not Applicable    Iron deficiency anemia [D50.9]  Yes    Desires immediate post-placental Mirena [Z30.014]  Yes      Resolved Hospital Problems   No resolved problems to display.     0400: 270/-3. Pitocin started.  0700: /-3. Epidural recently placed. Ballotable. Pit at 10.  0740: late decelerations twin A and B. BP 80s/50s, baseline 90s/60s. IVF bolus started. /-3, ballotable. Pit paused. Will restart with 20 min reassuring FHT.    PLAN:    Continue Close Maternal/Fetal Monitoring  Pitocin Augmentation per protocol  Recheck 2 hours or PRN    Sushma K. Reddy, MD  PGY-4, OBGYN  ------------------------  Patient seen and examined. On Pitocin. S/p epidural  Temp:  [97.5 °F (36.4 °C)-98.3 °F (36.8 °C)] 97.5 °F (36.4 °C)  Pulse:  [] 70  Resp:  [16] 16  SpO2:  [95 %-100 %] 100 %  BP: ()/(50-63) 88/56    NST: Twin A: 120  bpm/moderate variability/ 2+ accels/ variable decel at 0852. Late decels noted post epidural. Resolved  Twin B: 120 bpm/moderate variability/ 2 + accels/no decels  Teaticket: 4/10 minutes    Continue management. Plan for US guided delivery in OR.    Katya Diamond MD  PGY 5  Maternal Fetal Medicine  [facility]

## 2020-08-10 NOTE — PROGRESS NOTES
Resident to bedside for confirmation of presentation prior to IOL. Patient feeling well, denies any complaints at this time.    SVE: 2/70/-3    Fetus A: cephalic  Fetus B: transverse, back up, head on maternal left    Will start pitocin per protocol    Angelique Tran M.D.  OB/GYN PGY-2

## 2020-08-10 NOTE — PROGRESS NOTES
Pt doing ok. Pt aware that induction of labor will start at 0430. Pt denies Vaginal bleeding, reports + fetal movement x2. report Pt has visitor at bedside

## 2020-08-10 NOTE — L&D DELIVERY NOTE
Section Procedure Note    Date of procedure: 2020    Indications: 25 y.o.  female with IUP 34w1d weeks' gestational age for primary low transverse  section for di/di twin gestation. Pt had previously been admitted for suspected PPROM, and completed 10 days of latency antibiotics and BMZ. Pt was counseled on options regarding mode of delivery and opted for vaginal delivery. After approximately 12 hours of pitocin with AROM of remaining fluid of twin A, pt made little progression from /-3 to 3/90/-2. While pt did not yet meet criteria for failed induciton of labor, she opted for primary  given slow progress.    Pre-operative Diagnosis:   1.  Di/di twin gestation  2.  PPROM    Post-operative Diagnosis:   same  3. S/p primary low transverse  section    Procedure: Primary low transverse  section    Surgeon:   Surgeon(s) and Role:     * Julie R. Jeansonne, MD - Primary     * Hayes Frances MD - Resident - Assisting     Assistants: JANIE Frances MD (PGY-1)    Anesthesia: Epidural anesthesia    Findings: Live infant. Normal appearing uterus and bilateral fallopian tubes/ovaries    Estimated Blood Loss: Calculated QBL (Quantitative Blood Loss) (mL): 575     Specimens:   N/a                  Complications:  None; patient tolerated the procedure well.           Disposition: Recovery           Condition: stable    Procedure Details   The risks, benefits, complications, treatment options, and expected outcomes were discussed with the patient in her labor room.  The patient concurred with the proposed plan, giving informed consent. The patient was taken to Operating Room, identified as Fany May, birthdate and  procedure were verified.  A Time Out was held and the above information confirmed.    After anesthesia epidural was found to be adequate, the patient was prepped and draped in the usual sterile fashion in the dorsal supine position with a leftward tilt. A  pfannenstiel incision was made in the skin and carried  through the underlying subcutaneous tissue to the level of the  fascia. The fascia was scored at the midline. The fascial incision was then extended transversely using the curved vidal scissors. The superior aspect of the fascia was grasped with [facility] clamps x 2 and  from the underlying rectus tissue superiorly and with the help of the curved vidal scissors. Attention was then turned to the inferior aspect of the fascial incision which was grasped and  from the underlying rectus muscle in a similar fashion.The peritoneum was identified, found to be free of adherent bowel and entered via tenting the tissue with hemostats and incising the tissue with Metzenbaums. The peritoneal incision was extended with controlled lateral traction. The vesico-uterine peritoneum was then identified and bladder blade was inserted. The vesico-uterine peritoneum was grasped, tented away from the underlying uterus and entered sharply with the Metzenbaum scissors. The incision was extended  transversely and the bladder flap was bluntly freed from the lower uterine segment. The bladder blade was reinserted to keep the bladder out of the operative field. A transverse elliptical uterine incision was made with knife and extended with controlled cephalad-caudal traction. The amniotic sac was then entered bluntly and noted to be clear. Infant A was noted to be in cephalic position, was delivered, and then handed off to the waiting NICU staff.  In the similar manner infant B's amniotic sac was ruptured and the infant was found in cephalic position the infant was delivered in the normal fashion and handed off to the awaiting NICU staff.  The umbilical cords were clamped and cut cord blood was obtained for evaluation.     The placentas were removed intact and appeared and discarded. The uterus was exteriorized. The uterus, tubes and ovaries were examined and appeared normal.  The Mirena IUD's strings were trimmed to 10 cm.  The IUD was placed through the hysterotomy to the uterine fundus and strings were directed towards the cervix. The uterine incision was closed with running locked sutures of 0 chromic gut, with an imbricating layer superficially. Two figure of eight stiches were sewn and hemostasis was observed. The uterus was returned to the abdominal cavity. Incision was reinspected and good hemostasis was noted. The abdominal cavity was then cleared of all clots. The fascia was then reapproximated with running sutures of 1 PDS. The subcutaneous layer was reapproximated with 2-0 plain gut. The skin was reapproximated with 4-0 Monocryl. Sponge, lap and needle counts were correct x 2 prior the abdominal closure and at the conclusion of the case.        Marco A May [[id number]]     Delivery Information for Marco A May    Birth information:  YOB: 2019   Time of birth: 5:42 PM   Sex: female   Head Delivery Date/Time: 8/10/2020  5:42 PM   Delivery type: , Low Transverse   Gestational Age: 34w1d    Delivery Providers    Delivering clinician: Julie R. Jeansonne, MD   Provider Role    MD Paola Guadalupe, ABEL Flores             Measurements    Weight:   Length:          Apgars    Living status: Living  Apgars:  1 min.:  5 min.:  10 min.:  15 min.:  20 min.:    Skin color:  1  1       Heart rate:  2  2       Reflex irritability:  2  2       Muscle tone:  2  2       Respiratory effort:  2  2       Total:  9  9       Apgars assigned by: NICU         Operative Delivery    Forceps attempted?: No  Vacuum extractor attempted?: No         Shoulder Dystocia    Shoulder dystocia present?: No           Presentation    Presentation: Vertex           Interventions/Resuscitation    Method: NICU Attended       Cord    Vessels: 3 vessels  Complications: None  Delayed Cord Clamping?: No  Cord Clamped Date/Time:  8/10/2020  5:43 PM  Cord Blood Disposition: Sent with Baby  Gases Sent?: No  Stem Cell Collection (by MD): No       Placenta    Placenta delivery date/time: 8/10/2020 1744  Placenta removal: Manual removal  Placenta appearance: Intact  Placenta disposition: discarded           Labor Events:       labor: Yes     Labor Onset Date/Time:         Dilation Complete Date/Time:         Start Pushing Date/Time:       Rupture Date/Time: 20           Rupture type:  premature rupture of membranes         Fluid Amount: small(Patient reports no change in fluid amount and/ or color)      Fluid Color:       Fluid Odor:       Membrane Status (PeriCalm):       Rupture Date/Time (PeriCalm):       Fluid Amount (PeriCalm):       Fluid Color (PeriCalm):        steroids: Full Course     Antibiotics given for GBS: No     Induction:       Indications for induction:        Augmentation:       Indications for augmentation:       Labor complications: None     Additional complications:          Cervical ripening:                     Delivery:      Episiotomy:       Indication for Episiotomy:       Perineal Lacerations:   Repaired:      Periurethral Laceration:   Repaired:     Labial Laceration:   Repaired:     Sulcus Laceration:   Repaired:     Vaginal Laceration:   Repaired:     Cervical Laceration:   Repaired:     Repair suture:       Repair # of packets:       Last Value - EBL - Nursing (mL):       Sum - EBL - Nursing (mL): 0     Last Value - EBL - Anesthesia (mL):      Calculated QBL (mL): 575      Vaginal Sweep Performed:       Surgicount Correct:         Other providers:       Anesthesia    Method: Epidural          Details (if applicable):  Trial of Labor Yes    Categorization: Primary    Priority: Routine   Indications for : Multiple Gestation   Incision Type: low transverse     Additional  information:  Forceps:    Vacuum:    Breech:    Observed anomalies    Other  (Comments):            MATTHEW May [[id number]]     Delivery Information for MATTHEW May    Birth information:  YOB: 2019   Time of birth: 5:44 PM   Sex: female   Head Delivery Date/Time: 8/10/2020  5:44 PM   Delivery type: , Low Transverse   Gestational Age: 34w1d    Delivery Providers    Delivering clinician: Julie R. Jeansonne, MD   Provider Role    MD Paola Guadalupe RN Toki W Carter, RN Betty T Parker             Measurements    Weight:   Length:          Apgars    Living status: Living  Apgars:  1 min.:  5 min.:  10 min.:  15 min.:  20 min.:    Skin color:  0  1       Heart rate:  2  2       Reflex irritability:  2  2       Muscle tone:  2  2       Respiratory effort:  2  2       Total:  8  9       Apgars assigned by: NICU         Operative Delivery    Forceps attempted?: No  Vacuum extractor attempted?: No         Shoulder Dystocia    Shoulder dystocia present?: No           Presentation    Presentation: Vertex           Interventions/Resuscitation    Method: NICU Attended       Cord    Vessels: 3 vessels  Complications: None  Delayed Cord Clamping?: No  Cord Clamped Date/Time: 8/10/2020  5:44 PM  Cord Blood Disposition: Sent with Baby  Gases Sent?: No  Stem Cell Collection (by MD): No       Placenta    Placenta delivery date/time: 8/10/2020 1744  Placenta removal: Manual removal  Placenta appearance: Intact  Placenta disposition: discarded           Labor Events:       labor: Yes     Labor Onset Date/Time:         Dilation Complete Date/Time:         Start Pushing Date/Time:       Rupture Date/Time: 20           Rupture type:  premature rupture of membranes         Fluid Amount: small(Patient reports no change in fluid amount and/ or color)      Fluid Color:       Fluid Odor:       Membrane Status (PeriCalm):       Rupture Date/Time (PeriCalm):       Fluid Amount (PeriCalm):       Fluid Color (PeriCalm):         steroids: Full Course     Antibiotics given for GBS: No     Induction:       Indications for induction:        Augmentation:       Indications for augmentation:       Labor complications: None     Additional complications:          Cervical ripening:                     Delivery:      Episiotomy:       Indication for Episiotomy:       Perineal Lacerations:   Repaired:      Periurethral Laceration:   Repaired:     Labial Laceration:   Repaired:     Sulcus Laceration:   Repaired:     Vaginal Laceration:   Repaired:     Cervical Laceration:   Repaired:     Repair suture:       Repair # of packets:       Last Value - EBL - Nursing (mL):       Sum - EBL - Nursing (mL): 0     Last Value - EBL - Anesthesia (mL):      Calculated QBL (mL): 575      Vaginal Sweep Performed:       Surgicount Correct:         Other providers:       Anesthesia    Method: Epidural          Details (if applicable):  Trial of Labor Yes    Categorization: Primary    Priority: Routine   Indications for : Multiple Gestation   Incision Type: low transverse     Additional  information:  Forceps:    Vacuum:    Breech:    Observed anomalies    Other (Comments):            NOTE: THIS PATIENT IS A CANDIDATE FOR A TRIAL OF LABOR AFTER     JANIE Frances MD  OBGYN PGY2

## 2020-08-10 NOTE — PROGRESS NOTES
LABOR PROGRESS NOTE    S:  MD to bedside for cervical check. Complaints: None.    O: Temp:  [97.5 °F (36.4 °C)-98.3 °F (36.8 °C)] 97.5 °F (36.4 °C)  Pulse:  [] 70  Resp:  [16] 16  SpO2:  [95 %-100 %] 100 %  BP: ()/(50-63) 88/56    FHT: 120/125 BPM/moderate beat to beat variability/+accels/-/+ few late decels   CTX: q 3-5 minutes,     Dilation (cm): 2.5  Effacement (%): 80  Station: -3  Dose(units) Oxytocin: *14 akanksha-units/min     ASSESSMENT:   25 y.o.  at 34w1d, some change in latent labor  FHT reassuring  Cat 1      TIMELINE  0400: 270/-3; pit started  0730: 270/-3  1030: 2.5/80/-3    PLAN:  1. Labor management  -Likely AROM at next check  -Continue Close Maternal/Fetal Monitoring.   -Pitocin augmentation per protocol,   -Recheck 2 hours or PRN    JANIE Frances MD  OBGYN PGY2

## 2020-08-10 NOTE — ANESTHESIA PROCEDURE NOTES
Epidural    Patient location during procedure: OB   Reason for block: primary anesthetic   Diagnosis: iup   Start time: 8/10/2020 6:37 AM  Timeout: 8/10/2020 6:35 AM  End time: 8/10/2020 6:43 AM  Surgery related to: Vaginal Delivery    Staffing  Performing Provider: Jamel Tucker MD  Authorizing Provider: Ana Woods MD        Preanesthetic Checklist  Completed: patient identified, surgical consent, pre-op evaluation, timeout performed, IV checked, risks and benefits discussed, monitors and equipment checked, anesthesia consent given, hand hygiene performed and patient being monitored  Preparation  Patient position: sitting  Prep: ChloraPrep  Patient monitoring: Pulse Ox  Epidural  Skin Anesthetic: lidocaine 1%  Skin Wheal: 3 mL  Administration type: continuous  Approach: midline  Interspace: L3-4    Injection technique: NOAH saline  Needle and Epidural Catheter  Needle type: Tuohy   Needle gauge: 17  Needle length: 3.5 inches  Needle insertion depth: 4 cm  Catheter type: springwound and multi-orifice  Catheter size: 19 G  Catheter at skin depth: 8 cm  Test dose: 3 mL of lidocaine 1.5% with Epi 1-to-200,000  Additional Documentation: incremental injection, negative aspiration for heme and CSF, no paresthesia on injection, no signs/symptoms of IV or SA injection, no significant pain on injection and no significant complaints from patient  Needle localization: anatomical landmarks  Medications:  Volume per aspiration: 5 mL  Time between aspirations: 3 minutes  Assessment  Ease of block: easy  Patient's tolerance of the procedure: comfortable throughout block and no complaintsNo inadvertent dural puncture with Tuohy.  Dural puncture performed with spinal needle.

## 2020-08-10 NOTE — CARE UPDATE
LATE ENTRY:    Resident to bedside for cervical exam. SVE remains unchanged. Patient requesting to move forward with  section. Although patient does not meet criteria for failed IOL, minimal cervical change has been noted despite > 12 hours of pitocin augmentation. Patient understands risks associated with  delivery and wishes to proceed. Will proceed with primary c/s. Case request placed. Staff and anesthesia notified. Luciano/Bola BACA.    Angelique Tran M.D.  OB/GYN PGY-2

## 2020-08-10 NOTE — TRANSFER OF CARE
Anesthesia Transfer of Care Note    Patient: Fany May    Procedure(s) Performed: * No procedures listed *    Patient location: Labor and Delivery    Anesthesia Type: CSE    Transport from OR: Transported from OR on room air with adequate spontaneous ventilation    Post pain: adequate analgesia    Post assessment: no apparent anesthetic complications    Post vital signs: stable    Level of consciousness: awake    Nausea/Vomiting: no nausea/vomiting    Complications: none    Transfer of care protocol was followed      Last vitals:   Visit Vitals  BP (!) 91/53   Pulse 80   Temp 36.4 °C (97.5 °F)   Resp 16   Ht 5' (1.524 m)   Wt 50.8 kg (112 lb)   LMP 12/15/2019   SpO2 100%   Breastfeeding Unknown   BMI 21.87 kg/m²

## 2020-08-10 NOTE — PROGRESS NOTES
LABOR PROGRESS NOTE    S:  MD to bedside for cervical check. Complaints: None.     O: Temp:  [97.5 °F (36.4 °C)-98.3 °F (36.8 °C)] 97.5 °F (36.4 °C)  Pulse:  [] 80  Resp:  [16] 16  SpO2:  [95 %-100 %] 100 %  BP: ()/(50-63) 91/53    FHT: 125/125BPM/moderate beat to beat variability/+accels/+/+ late decels   CTX: q 2-3 minutes,     Dilation (cm): 3  Effacement (%): 90  Station: -2  Dose(units) Oxytocin: *22 akanksha-units/min     ASSESSMENT:   25 y.o.  at 34w1d, latent w/ small change; lates resolved w/ repositioning  FHT reassuring  Cat 2      TIMELINE  0400: 2/70/-3; pit started  0730: /-3  1030: 2.5/80/-3  1230: 2.5/80/-2; AROM cl  1330: 2.5/80/-2  1600: 3/90/-2      PLAN:  1. Labor management  -Continue Close Maternal/Fetal Monitoring.   -Pitocin augmentation per protocol,   -Recheck 2-4 hours or PRN    JANIE Frances MD  OBGYN PGY2

## 2020-08-10 NOTE — PROGRESS NOTES
LABOR PROGRESS NOTE    S:  MD to bedside for cervical check. Complaints: None.     O: Temp:  [97.5 °F (36.4 °C)-98.3 °F (36.8 °C)] 97.5 °F (36.4 °C)  Pulse:  [] 83  Resp:  [16] 16  SpO2:  [95 %-100 %] 100 %  BP: (81-99)/(50-63) 91/61    FHT: 120/125 BPM/moderate beat to beat variability/+accels/+ few late decels   CTX: q 2-3 minutes    Dilation (cm): 2.5  Effacement (%): 80  Station: -2  Dose(units) Oxytocin: *8 akanksha-units/min   AROM cl    ASSESSMENT:   25 y.o.  at 34w1d, AROM cl  FHT reassuring  Cat 1 (since pit off)      TIMELINE  0400: 2/70/-3; pit started  0730: 2/70/-3  1030: 2.5/80/-3  1230: 2.5/80/-2; AROM cl      PLAN:  1. Labor management  -Continue Close Maternal/Fetal Monitoring.   -Pitocin augmentation per protocol,   -Recheck 2 hours or PRN    JANIE Frances MD  OBGYN PGY2

## 2020-08-11 LAB
BASOPHILS # BLD AUTO: 0.03 K/UL (ref 0–0.2)
BASOPHILS NFR BLD: 0.2 % (ref 0–1.9)
DIFFERENTIAL METHOD: ABNORMAL
EOSINOPHIL # BLD AUTO: 0 K/UL (ref 0–0.5)
EOSINOPHIL NFR BLD: 0.1 % (ref 0–8)
ERYTHROCYTE [DISTWIDTH] IN BLOOD BY AUTOMATED COUNT: 24 % (ref 11.5–14.5)
HCT VFR BLD AUTO: 29.3 % (ref 37–48.5)
HGB BLD-MCNC: 9.4 G/DL (ref 12–16)
IMM GRANULOCYTES # BLD AUTO: 0.46 K/UL (ref 0–0.04)
IMM GRANULOCYTES NFR BLD AUTO: 3 % (ref 0–0.5)
LYMPHOCYTES # BLD AUTO: 1.4 K/UL (ref 1–4.8)
LYMPHOCYTES NFR BLD: 9.1 % (ref 18–48)
MCH RBC QN AUTO: 24 PG (ref 27–31)
MCHC RBC AUTO-ENTMCNC: 32.1 G/DL (ref 32–36)
MCV RBC AUTO: 75 FL (ref 82–98)
MONOCYTES # BLD AUTO: 1.3 K/UL (ref 0.3–1)
MONOCYTES NFR BLD: 8.6 % (ref 4–15)
NEUTROPHILS # BLD AUTO: 12.3 K/UL (ref 1.8–7.7)
NEUTROPHILS NFR BLD: 79 % (ref 38–73)
NRBC BLD-RTO: 0 /100 WBC
PLATELET # BLD AUTO: 135 K/UL (ref 150–350)
PMV BLD AUTO: 11.5 FL (ref 9.2–12.9)
RBC # BLD AUTO: 3.92 M/UL (ref 4–5.4)
WBC # BLD AUTO: 15.51 K/UL (ref 3.9–12.7)

## 2020-08-11 PROCEDURE — 63600175 PHARM REV CODE 636 W HCPCS: Performed by: STUDENT IN AN ORGANIZED HEALTH CARE EDUCATION/TRAINING PROGRAM

## 2020-08-11 PROCEDURE — 11000001 HC ACUTE MED/SURG PRIVATE ROOM

## 2020-08-11 PROCEDURE — 85025 COMPLETE CBC W/AUTO DIFF WBC: CPT

## 2020-08-11 PROCEDURE — 99231 SBSQ HOSP IP/OBS SF/LOW 25: CPT | Mod: ,,, | Performed by: OBSTETRICS & GYNECOLOGY

## 2020-08-11 PROCEDURE — 72100003 HC LABOR CARE, EA. ADDL. 8 HRS

## 2020-08-11 PROCEDURE — 99231 PR SUBSEQUENT HOSPITAL CARE,LEVL I: ICD-10-PCS | Mod: ,,, | Performed by: OBSTETRICS & GYNECOLOGY

## 2020-08-11 PROCEDURE — 36415 COLL VENOUS BLD VENIPUNCTURE: CPT

## 2020-08-11 PROCEDURE — 25000003 PHARM REV CODE 250: Performed by: STUDENT IN AN ORGANIZED HEALTH CARE EDUCATION/TRAINING PROGRAM

## 2020-08-11 RX ADMIN — KETOROLAC TROMETHAMINE 30 MG: 30 INJECTION, SOLUTION INTRAMUSCULAR at 10:08

## 2020-08-11 RX ADMIN — KETOROLAC TROMETHAMINE 30 MG: 30 INJECTION, SOLUTION INTRAMUSCULAR at 01:08

## 2020-08-11 RX ADMIN — MINERAL SUPPLEMENT IRON 300 MG / 5 ML STRENGTH LIQUID 100 PER BOX UNFLAVORED 300 MG: at 10:08

## 2020-08-11 NOTE — ANESTHESIA POSTPROCEDURE EVALUATION
Anesthesia Post Evaluation    Patient: Fany May    Procedure(s) Performed: Procedure(s) (LRB):   SECTION (N/A)    Final Anesthesia Type: epidural    Patient location during evaluation: labor & delivery  Patient participation: Yes- Able to Participate  Level of consciousness: awake and alert  Post-procedure vital signs: reviewed and stable  Pain management: adequate  Airway patency: patent    PONV status at discharge: No PONV  Anesthetic complications: no      Cardiovascular status: stable  Respiratory status: unassisted, room air and spontaneous ventilation  Hydration status: euvolemic  Follow-up not needed.          Vitals Value Taken Time   /65 20 1219   Temp 36.7 °C (98.1 °F) 20 1219   Pulse 80 20 1219   Resp 18 20 1219   SpO2 100 % 20 1219         Event Time   Out of Recovery 08/10/2020 20:40:00         Pain/Mayank Score: Pain Rating Prior to Med Admin: 3 (2020 10:30 AM)  Pain Rating Post Med Admin: 0 (2020 11:15 AM)

## 2020-08-11 NOTE — PROGRESS NOTES
POSTPARTUM PROGRESS NOTE     Fany May is a 25 y.o. female POD #1 status post Primary  section at 34w1d. Pregnancy was complicated by PPROM, di/di twin gestation. Delivery complicated by no issues.     Patient is doing well this morning. She denies nausea, vomiting, fever or chills. Patient reports mild abdominal pain that is well relieved by oral pain medications. Lochia is mild and decreasing. Patient is voiding freely without difficulty and ambulating without difficulty. She has passed flatus, and has not had Bm.    Patient does plan to breast feed/pump. S/p IUD regarding contraception.    Objective:       Temp:  [97.7 °F (36.5 °C)-98 °F (36.7 °C)] 98 °F (36.7 °C)  Pulse:  [58-97] 77  Resp:  [14-18] 18  SpO2:  [94 %-100 %] 100 %  BP: ()/(50-63) 113/55    General:   healthy, alert, no distress   Lungs:   Normal respiratory effort bilaterally   Heart:   regular rate   Abdomen:  soft, non-tender; bowel sounds normal; no masses,  no organomegaly   Uterus:  firm located at the umblicus.    Incision: Bandage in place, clean, dry and intact   Extremities: peripheral pulses normal, no pedal edema, no clubbing or cyanosis     Lab Review  Lab Results   Component Value Date    WBC 15.51 (H) 2020    HGB 9.4 (L) 2020    HCT 29.3 (L) 2020    MCV 75 (L) 2020     (L) 2020          I/O    Intake/Output Summary (Last 24 hours) at 2020 0926  Last data filed at 2020 0500  Gross per 24 hour   Intake --   Output 3825 ml   Net -3825 ml        Assessment:     Patient Active Problem List   Diagnosis    Sickle cell trait    Dichorionic diamniotic twin pregnancy, antepartum    Pregnancy with poor reproductive history, antepartum    Desires immediate post-placental Mirena    Iron deficiency anemia     premature rupture of membranes (PPROM) with unknown onset of labor    31 weeks gestation of pregnancy    Elevated glucose tolerance test    Status post primary  low transverse  section        Plan:   1. Postpartum care:  - Patient doing well. Continue routine management and advances.  - Continue PO pain meds. Pain well controlled.  - Heme: Pre:  PP:  - Encourage ambulation  - Contraception: s/p mirena  - Lactation: breast/pumping    Dispo: As patient meets milestones, will plan to discharge as appropriate.    JANIE Frances MD  OBGYN PGY2

## 2020-08-11 NOTE — PLAN OF CARE
Recommendations    1. Continue current Regular diet.     2. If intake <50% offer Boost Plus.    Goals: Pt to consume >75% of meals by RD follow up.  Nutrition Goal Status: new  Communication of RD Recs: (plan of care)

## 2020-08-11 NOTE — LACTATION NOTE
08/11/20 1200   Maternal Infant Feeding   Maternal Preparation hand hygiene   Maternal Emotional State relaxed   Equipment Type   Breast Pump Type double electric, hospital grade   Breast Pump Flange Type hard   Breast Pump Flange Size 24 mm   Breast Pumping   Breast Pumping Interventions frequent pumping encouraged   Breast Pumping double electric breast pump utilized   Mother reports that she has pumped x 4 since delivery and has expressed 1-5 ml at each session. Small amount in collection bottles at the bedside from last pumping. Assisted with labeling and stored in refrigerator on MBU. Demonstrated cleaning of pump kit now. Left on counter on clean paper towels to dry. Initiated pumping log. Reviewed frequency and duration of pumping on Initiation setting, suction level, flange fit, and daily target amounts expected. NICU Mother's Breastfeeding Guide given.

## 2020-08-11 NOTE — PROGRESS NOTES
[facility]  Adult Nutrition  Progress Note    SUMMARY     Recommendations    1. Continue current Regular diet.     2. If intake <50% offer Boost Plus.    Goals: Pt to consume >75% of meals by RD follow up.  Nutrition Goal Status: new  Communication of RD Recs: (plan of care)    Reason for Assessment    Reason For Assessment: consult, length of stay  Relevant Medical History: sickle cell trait  General Information Comments:   Pt admitted to hospital with  premature rupture of membranes. Pt on regular diet, PO intake % of meals. Chart shows 7% wt loss of 8lb in past month. Pt not seen due to getting .  Nutrition Discharge Planning: Adequate nutrition needs via Regular diet.    Nutrition Risk Screen    Nutrition Risk Screen: no indicators present    Nutrition/Diet History    Spiritual, Cultural Beliefs, Restorationist Practices, Values that Affect Care: no  Factors Affecting Nutritional Intake: nausea/vomiting, decreased appetite    Anthropometrics    Temp: 98 °F (36.7 °C)  Height Method: Stated  Height: 5' (152.4 cm)  Height (inches): 60 in  Weight Method: Bed Scale  Weight: 50.8 kg (112 lb)  Weight (lb): 112 lb  Ideal Body Weight (IBW), Female: 100 lb  % Ideal Body Weight, Female (lb): 104.28 %  BMI (Calculated): 21.9  BMI Grade: 18.5-24.9 - normal     Lab/Procedures/Meds    Pertinent Labs Comments: WDL  Pertinent Medications Comments: Prenatal vitamin    Estimated/Assessed Needs    Weight Used For Calorie Calculations: 50.8 kg (112 lbs)  Energy Calorie Requirements (kcal): 6551-8500 kcal daily  Energy Need Method: [place]-St Jeor(+ 340-450 kcal)  Protein Requirements: 52 gm daily  Weight Used For Protein Calculations: 55.8 kg (112 lbs)(1.1 gm/kg)  Fluid Requirements (mL): 1 mL/kcal or per MD  Estimated Fluid Requirement Method: RDA Method  RDA Method (mL): 1484     Nutrition Prescription Ordered    Current Diet Order: Regular    Evaluation of Received Nutrient/Fluid Intake    %  Intake of Estimated Energy Needs: 75 - 100 %  % Meal Intake: 75 - 100 %    Nutrition Risk    Level of Risk/Frequency of Follow-up: low(f/u 1x/weekly)     Assessment and Plan    Nutrition Problem  Unintentional wt loss     Related to (etiology):   Nausea & loss of appetite     Signs and Symptoms (as evidenced by):    7% wt loss of 8lb in past month     Interventions (treatment strategy):  Collaboration with other providers     Nutrition Diagnosis Status:   Ongoing     Monitor and Evaluation    Food and Nutrient Intake: energy intake  Food and Nutrient Adminstration: diet order  Knowledge/Beliefs/Attitudes: food and nutrition knowledge/skill  Anthropometric Measurements: weight, weight change  Biochemical Data, Medical Tests and Procedures: electrolyte and renal panel, gastrointestinal profile, glucose/endocrine profile, inflammatory profile, lipid profile  Nutrition-Focused Physical Findings: overall appearance     Malnutrition Assessment     Nik Score: 16  Pt not seen due to getting  8.11.20.    Nutrition Follow-Up    RD Follow-up?: Yes

## 2020-08-12 VITALS
HEIGHT: 60 IN | HEART RATE: 86 BPM | BODY MASS INDEX: 21.99 KG/M2 | TEMPERATURE: 98 F | SYSTOLIC BLOOD PRESSURE: 123 MMHG | WEIGHT: 112 LBS | OXYGEN SATURATION: 99 % | RESPIRATION RATE: 18 BRPM | DIASTOLIC BLOOD PRESSURE: 66 MMHG

## 2020-08-12 PROBLEM — Z30.014 ENCOUNTER FOR INITIAL PRESCRIPTION OF INTRAUTERINE CONTRACEPTIVE DEVICE (IUD): Status: RESOLVED | Noted: 2020-07-06 | Resolved: 2020-08-12

## 2020-08-12 PROBLEM — O42.919 PRETERM PREMATURE RUPTURE OF MEMBRANES (PPROM) WITH UNKNOWN ONSET OF LABOR: Status: RESOLVED | Noted: 2020-07-23 | Resolved: 2020-08-12

## 2020-08-12 PROBLEM — R73.09 ELEVATED GLUCOSE TOLERANCE TEST: Status: RESOLVED | Noted: 2020-08-03 | Resolved: 2020-08-12

## 2020-08-12 PROBLEM — Z3A.31 31 WEEKS GESTATION OF PREGNANCY: Status: RESOLVED | Noted: 2020-07-23 | Resolved: 2020-08-12

## 2020-08-12 PROCEDURE — 99238 HOSP IP/OBS DSCHRG MGMT 30/<: CPT | Mod: ,,, | Performed by: OBSTETRICS & GYNECOLOGY

## 2020-08-12 PROCEDURE — 25000003 PHARM REV CODE 250: Performed by: STUDENT IN AN ORGANIZED HEALTH CARE EDUCATION/TRAINING PROGRAM

## 2020-08-12 PROCEDURE — 99238 PR HOSPITAL DISCHARGE DAY,<30 MIN: ICD-10-PCS | Mod: ,,, | Performed by: OBSTETRICS & GYNECOLOGY

## 2020-08-12 RX ORDER — IBUPROFEN 800 MG/1
800 TABLET ORAL EVERY 8 HOURS PRN
Qty: 60 TABLET | Refills: 1 | Status: SHIPPED | OUTPATIENT
Start: 2020-08-12

## 2020-08-12 RX ORDER — DOCUSATE SODIUM 100 MG/1
100 CAPSULE, LIQUID FILLED ORAL 2 TIMES DAILY
Qty: 60 CAPSULE | Refills: 1 | Status: SHIPPED | OUTPATIENT
Start: 2020-08-12 | End: 2021-08-12

## 2020-08-12 RX ORDER — OXYCODONE AND ACETAMINOPHEN 5; 325 MG/1; MG/1
1 TABLET ORAL EVERY 4 HOURS PRN
Qty: 20 TABLET | Refills: 0 | Status: SHIPPED | OUTPATIENT
Start: 2020-08-12

## 2020-08-12 RX ORDER — FERROUS SULFATE 220 (44)/5
300 ELIXIR ORAL 2 TIMES DAILY
Qty: 410 ML | Refills: 2 | Status: SHIPPED | OUTPATIENT
Start: 2020-08-12

## 2020-08-12 RX ADMIN — IBUPROFEN 800 MG: 400 TABLET, FILM COATED ORAL at 01:08

## 2020-08-12 RX ADMIN — MINERAL SUPPLEMENT IRON 300 MG / 5 ML STRENGTH LIQUID 100 PER BOX UNFLAVORED 300 MG: at 01:08

## 2020-08-12 RX ADMIN — MINERAL SUPPLEMENT IRON 300 MG / 5 ML STRENGTH LIQUID 100 PER BOX UNFLAVORED 300 MG: at 08:08

## 2020-08-12 RX ADMIN — IBUPROFEN 800 MG: 400 TABLET, FILM COATED ORAL at 09:08

## 2020-08-12 NOTE — LACTATION NOTE
Discharge education provided on pumping for nicu baby. Questions answered. Pt will use [employer] pis at home. Pt has lactation contact number.

## 2020-08-12 NOTE — DISCHARGE SUMMARY
[facility]  Obstetrics  Discharge Summary      Patient Name: Fany May  MRN: [id number]  Admission Date: 2020  Hospital Length of Stay: 20 days  Discharge Date and Time:  2020 6:25 AM  Attending Physician: Marcos Hudson MD   Discharging Provider: Hayes Frances MD   Primary Care Provider: Primary Doctor No    HPI: Fany May is a 25 y.o. [id number] at 31w4d with di-di twin pregnancy who presents from [facility] clinic for concern for rupture of membranes. Patient endorses loss of fluid since , when she presented to the [facility] and had a negative rupture exam, bedside ultrasound demonstrated membrane with fluid pockets on both sides. She also endorses contractions that have increased in frequency and intensity. Denies vaginal bleeding,   Fetal Movement: normal.     MFM: BPP of 8/8 for A and B. Multiple sites of sac separation between twin A and twin B were found on MFM ultrasound concerning for chorioamniotic membrane separation and PPROM.     With MFM ultrasound and given patient's self described concerns about PPROM, this fits known data   concerning pPROM of one or both sacs confined by membranes.     Pregnancy also complicated by FGR 8th percentile A , B 11%.            Procedure(s) (LRB):   SECTION (N/A)     Hospital Course:   2020 Overnight patient endorsed increased contractions with pain increasing from 3 to 5. Patient cervix was re-checked and found to be unchanged. Since then, the patient's contractions have spaced out to q10-20 minutes. Patient is still feeling them and rating them at 5/10 pain. Continues to endorse trickling of fluid. Denies vaginal bleeding. Endorses fetal movement.  2020 Called to patient's room once overnight. Placed on FHT for 1 hour, no contractions noted on monitor. Called out again for a different constant abdominal pain. SSE performed, cervix visually unchanged 1-2 cm, white discharge noted, but no vaginal pooling or fluid  "noted. Pain relieved by tylenol. Continues to endorse trickling of fluid. Denies vaginal bleeding. Endorses fetal movement.  07/26/2020 NSTs reassuring yesterday; patient denies any pain and did not experience any pain yesterday; endorses fetal movement; no bleeding; no contractions  07/27/2020 NST reassuring yesterday. Pt reports continued minimal leakage of clear fluid, worse when standing or with fetal movement. She complains of burning epigastric pain, worse when laying down. No vaginal bleeding, no contractions.  07/28/2020 NST reassuring yesterday. Patient reports continued minimal leakage of clear fluid, worse when standing & ambulating. Epigastric pain has improved with PPI. Feeling abdominal "tightening" 2 times per day, no regular contractions, no vaginal bleeding, endorses regular fetal movement. Pt complains of occasional mild HA, improves spontaneously. Denies visual changes, CP, SOB, RUQ pain.  07/29/2020 NST reassuring yesterday. Patient feels leakage of fluid has improved. Epigastric pain and HA have resolved. Still feeling abdominal tightening and cramping intermittently, unable to specify frequency, was able to sleep through the night. One episode of increased pelvic pressure at 2000, spec exam was unchanged 2/60/-3. Patient feeling very down about staying in the hospital for an extended period.   07/30/2020 NST reassuring yesterday. Patient feels leakage of fluid continues to decrease but still present when standing. Complains of intermittent pelvic pressure, worsening over past 12 hours. Patient denying symptoms of depression and declines SSRI at this time.  07/31/2020 Patient felt increasing pelvic pressure last night & placed on monitor, FHT was reactive & reassuring, TOCO with ctx [id number]. No abdominal pain/pressure at present. Patient still endorsing mild leakage of fluid. Still having epigastric burning with PO intake, unable to tolerate liquid famotidine but unwilling to try to swallow " pills at this time.  2020: NST reactive, reassuring. Continues to endorse feeling LOF at times. Transfused 1u pRBC for anemia  2020 NSTreactive reassuring x 2 overnight. Had one episode of vaginal spotting yesterday. Negative speculum exam. Continuous monitoring reassuring.   2020-2020     NST r/r. BMZ and latency abx completed. No complaints apart from slow LOF. Continue course.   2020- Pt complaining of mild pain with ctx on morning NST, toco with ctx q3-6 mins. SVE 2/60/-3, cervix posterior. PM NST reactive and reassuring, ctx q10 mins.  2020 - No issues overnight. Pt reports irregular, infrequent ctx. Good FM. Denies vaginal bleeding.   8/10/20: IOL started for vaginal delivery. After 12 hours of pitocin, pt made little change (2 to 3cm). Although the patient did not meet criteria for dx of failed IOL, pt desired primary CS for di/di gestation    Pt's operative and postoperative course were uncomplicated.  On postop day 2 patient was meeting all appropriate milestones, tolerating p.o., pain was well controlled with oral meds, and was stable for discharge.     Consults (From admission, onward)        Status Ordering Provider     Inpatient consult to Lactation  Once     Provider:  (Not yet assigned)    Completed YASMIN GEE          Final Active Diagnoses:    Diagnosis Date Noted POA    PRINCIPAL PROBLEM:  Status post primary low transverse  section [Z98.891] 08/10/2020 Not Applicable    Iron deficiency anemia [D50.9] 2020 Yes      Problems Resolved During this Admission:    Diagnosis Date Noted Date Resolved POA     premature rupture of membranes (PPROM) with unknown onset of labor [O42.919] 2020 Yes    Elevated glucose tolerance test [R73.09] 2020 No    31 weeks gestation of pregnancy [Z3A.31] 2020 Not Applicable    Desires immediate post-placental Mirena [Z30.014] 2020 Yes         Significant Diagnostic Studies: Labs: All labs within the past 24 hours have been reviewed      Feeding Method: pumping    Immunizations     None             Marco A Maytimothy [[id number]]     Delivery:    Episiotomy:     Lacerations:     Repair suture:     Repair # of packets:     Blood loss (ml):       Birth information:  YOB: 2019   Time of birth: 5:42 PM   Sex: female   Delivery type: , Low Transverse   Gestational Age: 34w1d    Delivery Clinician:      Other providers:       Additional  information:  Forceps:    Vacuum:    Breech:    Observed anomalies      Living?:           APGARS  One minute Five minutes Ten minutes   Skin color:         Heart rate:         Grimace:         Muscle tone:         Breathing:         Totals: 9  9        Placenta: Delivered:       appearance     MATTHEW May [[id number]]     Delivery:    Episiotomy:     Lacerations:     Repair suture:     Repair # of packets:     Blood loss (ml):       Birth information:  YOB: 2019   Time of birth: 5:44 PM   Sex: female   Delivery type: , Low Transverse   Gestational Age: 34w1d    Delivery Clinician:      Other providers:       Additional  information:  Forceps:    Vacuum:    Breech:    Observed anomalies      Living?:           APGARS  One minute Five minutes Ten minutes   Skin color:         Heart rate:         Grimace:         Muscle tone:         Breathing:         Totals: 8  9        Placenta: Delivered:       appearance    Pending Diagnostic Studies:     None          Discharged Condition: good    Disposition: Home or Self Care    Follow Up:  Follow-up Information     [facility]. Schedule an appointment as soon as possible for a visit in 6 weeks.    Specialty: Obstetrics and Gynecology  Why: Postpartum Follow-Up  Contact information:  [address] [place] [address] [phone]               Patient Instructions:      Diet Adult Regular      Pelvic Rest   Order Comments: Nothing in vagina until seen in clinic     Lifting restrictions   Order Comments: No lifting anything larger than baby for 6 weeks     No driving until:   Order Comments: No driving while taking narcotics     Notify your health care provider if you experience any of the following:  temperature >100.4     Notify your health care provider if you experience any of the following:  persistent nausea and vomiting or diarrhea     Notify your health care provider if you experience any of the following:  severe uncontrolled pain     Notify your health care provider if you experience any of the following:  redness, tenderness, or signs of infection (pain, swelling, redness, odor or green/yellow discharge around incision site)     Notify your health care provider if you experience any of the following:  difficulty breathing or increased cough     Notify your health care provider if you experience any of the following:  severe persistent headache     Notify your health care provider if you experience any of the following:  worsening rash     Notify your health care provider if you experience any of the following:  persistent dizziness, light-headedness, or visual disturbances     Notify your health care provider if you experience any of the following:   Order Comments: Heavy vaginal bleeding     Activity as tolerated     Medications:  Current Discharge Medication List      START taking these medications    Details   docusate sodium (COLACE) 100 MG capsule Take 1 capsule (100 mg total) by mouth 2 (two) times daily.  Qty: 60 capsule, Refills: 1      ferrous sulfate 300 mg (60 mg iron)/5 mL syrup Take 5 mLs (300 mg total) by mouth 2 (two) times daily.  Qty: 60 mL, Refills: 2      ibuprofen (ADVIL,MOTRIN) 800 MG tablet Take 1 tablet (800 mg total) by mouth every 8 (eight) hours as needed for Pain (Take scheduled for next 5-7 days, then as needed for pain).  Qty: 60 tablet, Refills: 1       oxyCODONE-acetaminophen (PERCOCET) 5-325 mg per tablet Take 1 tablet by mouth every 4 (four) hours as needed.  Qty: 20 tablet, Refills: 0    Comments: Quantity prescribed more than 7 day supply? No         STOP taking these medications       PNV #56-iron-folic acid-dha 35 mg iron-5 mg iron-1 mg Cap Comments:   Reason for Stopping:         prenatal vit calc,iron,folic (PRENATAL VITAMIN ORAL) Comments:   Reason for Stopping:             JANIE Frances MD  OBGYN PGY2

## 2020-08-12 NOTE — PROGRESS NOTES
Pt discharged this afternoon per OBGYN's order. How to Care for Yourself After Delivery booklet reviewed. Pt verbalized understanding. Pt is doing well. Ambulating without difficulty, voiding, passing flatus, pt's bleeding has been light throughout shift and pt's pain has been well controlled by oral pain medication. Pt discharged in stable condition.

## 2020-08-12 NOTE — PROGRESS NOTES
POSTPARTUM PROGRESS NOTE     Fany May is a 25 y.o. female POD #2 status post Primary  section at 34w1d. Pregnancy was complicated by PPROM, di/di twin gestation. Delivery complicated by no issues.     Patient is doing well this morning. She denies nausea, vomiting, fever or chills. Patient reports mild abdominal pain that is well relieved by oral pain medications. Lochia is mild and decreasing. Patient is voiding freely without difficulty and ambulating without difficulty. She has passed flatus, and has not had Bm.    Patient does plan to breast feed/pump. S/p IUD regarding contraception.    Objective:       Temp:  [98 °F (36.7 °C)-98.7 °F (37.1 °C)] 98.7 °F (37.1 °C)  Pulse:  [74-86] 74  Resp:  [16-18] 16  SpO2:  [98 %-100 %] 98 %  BP: ()/(54-65) 105/57    General:   healthy, alert, no distress   Lungs:   Normal respiratory effort bilaterally   Heart:   regular rate   Abdomen:  soft, non-tender; bowel sounds normal; no masses,  no organomegaly   Uterus:  firm located at the umblicus.    Incision: Bandage in place, clean, dry and intact   Extremities: peripheral pulses normal, no pedal edema, no clubbing or cyanosis     Lab Review  Lab Results   Component Value Date    WBC 15.51 (H) 2020    HGB 9.4 (L) 2020    HCT 29.3 (L) 2020    MCV 75 (L) 2020     (L) 2020          I/O    Intake/Output Summary (Last 24 hours) at 2020 0621  Last data filed at 2020 2200  Gross per 24 hour   Intake --   Output 900 ml   Net -900 ml        Assessment:     Patient Active Problem List   Diagnosis    Sickle cell trait    Dichorionic diamniotic twin pregnancy, antepartum    Pregnancy with poor reproductive history, antepartum    Desires immediate post-placental Mirena    Iron deficiency anemia     premature rupture of membranes (PPROM) with unknown onset of labor    31 weeks gestation of pregnancy    Elevated glucose tolerance test    Status post primary low  transverse  section        Plan:   1. Postpartum care:  - Patient doing well. Continue routine management and advances.  - Continue PO pain meds. Pain well controlled.  - Encourage ambulation  - Contraception: s/p mirena  - Lactation: breast/pumping    2. Iron deficiency anemia  - Heme: Pre:  PP:  - Iron supplementation PP      Dispo: As patient meets milestones, will plan to discharge as appropriate.    JANIE Frances MD  OBGYN PGY2       I have reviewed the Resident's progress note.  I have personally interviewed and examined the patient at bedside and agree with the findings as documented.  All patient questions answered.  -- JANIE Castillo M.D.

## 2020-08-14 ENCOUNTER — TELEPHONE (OUTPATIENT)
Dept: LACTATION | Facility: CLINIC | Age: 25
End: 2020-08-14

## 2020-08-14 NOTE — TELEPHONE ENCOUNTER
Pt reports milk came in yesterday, last achieved 30mL with pumping, denies pain with pump. Pumped 6-8x/24 hours. Interested in latching infants, encouraged to discuss with Peds and request visit with Kristen. Pt has cooler for milk transport. Denies questions/concerns at this time.

## 2021-05-11 ENCOUNTER — HOSPITAL ENCOUNTER (EMERGENCY)
Facility: HOSPITAL | Age: 26
Discharge: HOME | End: 2021-05-11
Attending: EMERGENCY MEDICINE
Payer: MEDICAID

## 2021-05-11 VITALS
WEIGHT: 107 LBS | DIASTOLIC BLOOD PRESSURE: 54 MMHG | OXYGEN SATURATION: 98 % | RESPIRATION RATE: 14 BRPM | TEMPERATURE: 99 F | HEIGHT: 60 IN | HEART RATE: 78 BPM | BODY MASS INDEX: 21.01 KG/M2 | SYSTOLIC BLOOD PRESSURE: 103 MMHG

## 2021-05-11 DIAGNOSIS — H61.23 BILATERAL IMPACTED CERUMEN: Primary | ICD-10-CM

## 2021-05-11 PROCEDURE — 25000003 PHARM REV CODE 250: Performed by: EMERGENCY MEDICINE

## 2021-05-11 PROCEDURE — 99283 EMERGENCY DEPT VISIT LOW MDM: CPT

## 2021-05-11 PROCEDURE — 99284 PR EMERGENCY DEPT VISIT,LEVEL IV: ICD-10-PCS | Mod: ,,, | Performed by: EMERGENCY MEDICINE

## 2021-05-11 PROCEDURE — 99284 EMERGENCY DEPT VISIT MOD MDM: CPT | Mod: ,,, | Performed by: EMERGENCY MEDICINE

## 2021-05-11 RX ADMIN — CARBAMIDE PEROXIDE 6.5% 5 DROP: 6.5 LIQUID AURICULAR (OTIC) at 03:05

## 2021-06-14 PROBLEM — O09.299 PREGNANCY WITH POOR REPRODUCTIVE HISTORY, ANTEPARTUM: Status: RESOLVED | Noted: 2020-03-03 | Resolved: 2021-06-14

## 2022-09-07 ENCOUNTER — HOSPITAL ENCOUNTER (EMERGENCY)
Facility: HOSPITAL | Age: 27
Discharge: HOME | End: 2022-09-07
Attending: EMERGENCY MEDICINE
Payer: MEDICAID

## 2022-09-07 VITALS
WEIGHT: 115 LBS | OXYGEN SATURATION: 99 % | SYSTOLIC BLOOD PRESSURE: 112 MMHG | RESPIRATION RATE: 16 BRPM | HEART RATE: 75 BPM | DIASTOLIC BLOOD PRESSURE: 66 MMHG | TEMPERATURE: 98 F | BODY MASS INDEX: 22.46 KG/M2

## 2022-09-07 DIAGNOSIS — B96.89 BV (BACTERIAL VAGINOSIS): ICD-10-CM

## 2022-09-07 DIAGNOSIS — N72 CERVICITIS: Primary | ICD-10-CM

## 2022-09-07 DIAGNOSIS — N76.0 BV (BACTERIAL VAGINOSIS): ICD-10-CM

## 2022-09-07 LAB
B-HCG UR QL: NEGATIVE
BACTERIA GENITAL QL WET PREP: ABNORMAL
BILIRUB UR QL STRIP: NEGATIVE
CLARITY UR: CLEAR
CLUE CELLS VAG QL WET PREP: ABNORMAL
COLOR UR: COLORLESS
CTP QC/QA: YES
FILAMENT FUNGI VAG WET PREP-#/AREA: ABNORMAL
GLUCOSE UR QL STRIP: NEGATIVE
HGB UR QL STRIP: NEGATIVE
KETONES UR QL STRIP: NEGATIVE
LEUKOCYTE ESTERASE UR QL STRIP: NEGATIVE
NITRITE UR QL STRIP: NEGATIVE
PH UR STRIP: 7 [PH] (ref 5–8)
PROT UR QL STRIP: NEGATIVE
SP GR UR STRIP: 1 (ref 1–1.03)
SPECIMEN SOURCE: ABNORMAL
T VAGINALIS GENITAL QL WET PREP: ABNORMAL
URN SPEC COLLECT METH UR: ABNORMAL
UROBILINOGEN UR STRIP-ACNC: NEGATIVE EU/DL
WBC #/AREA VAG WET PREP: ABNORMAL
YEAST GENITAL QL WET PREP: ABNORMAL

## 2022-09-07 PROCEDURE — 87210 SMEAR WET MOUNT SALINE/INK: CPT | Performed by: PHYSICIAN ASSISTANT

## 2022-09-07 PROCEDURE — 99284 EMERGENCY DEPT VISIT MOD MDM: CPT

## 2022-09-07 PROCEDURE — 96372 THER/PROPH/DIAG INJ SC/IM: CPT | Performed by: PHYSICIAN ASSISTANT

## 2022-09-07 PROCEDURE — 81025 URINE PREGNANCY TEST: CPT | Performed by: PHYSICIAN ASSISTANT

## 2022-09-07 PROCEDURE — 87491 CHLMYD TRACH DNA AMP PROBE: CPT | Performed by: PHYSICIAN ASSISTANT

## 2022-09-07 PROCEDURE — 87591 N.GONORRHOEAE DNA AMP PROB: CPT | Performed by: PHYSICIAN ASSISTANT

## 2022-09-07 PROCEDURE — 81003 URINALYSIS AUTO W/O SCOPE: CPT | Performed by: PHYSICIAN ASSISTANT

## 2022-09-07 PROCEDURE — 63600175 PHARM REV CODE 636 W HCPCS: Performed by: PHYSICIAN ASSISTANT

## 2022-09-07 RX ORDER — CEFTRIAXONE 500 MG/1
250 INJECTION, POWDER, FOR SOLUTION INTRAMUSCULAR; INTRAVENOUS
Status: COMPLETED | OUTPATIENT
Start: 2022-09-07 | End: 2022-09-07

## 2022-09-07 RX ORDER — DOXYCYCLINE 100 MG/1
100 CAPSULE ORAL 2 TIMES DAILY
Qty: 20 CAPSULE | Refills: 0 | Status: SHIPPED | OUTPATIENT
Start: 2022-09-07 | End: 2022-09-17

## 2022-09-07 RX ORDER — METRONIDAZOLE 500 MG/1
500 TABLET ORAL EVERY 12 HOURS
Qty: 14 TABLET | Refills: 0 | Status: SHIPPED | OUTPATIENT
Start: 2022-09-07 | End: 2022-09-14

## 2022-09-07 RX ADMIN — CEFTRIAXONE SODIUM 250 MG: 500 INJECTION, POWDER, FOR SOLUTION INTRAMUSCULAR; INTRAVENOUS at 12:09

## 2022-09-07 NOTE — ED PROVIDER NOTES
Encounter Date: 2022    SCRIBE #1 NOTE: I, Tyler Zamora, am scribing for, and in the presence of,  CRYSTAL Tinajero. I have scribed the entire note. Other sections scribed: HPI, ROS.     History     Chief Complaint   Patient presents with    Vaginal Discharge     2 days of heavy clear vaginal discharge, denies any pain or urinary issues     CC: Vaginal discharge    HPI: This is a 27 y.o. F who has a PMHx of Sickle cell trait who presents to the ED for emergent evaluation of acute abnormal vaginal discharge that began 2 days ago. Pt reports having unprotected sexual intercourse a few days ago. Pt denies vaginal pain, or pelvic pain.    The history is provided by the patient. No  was used.   Review of patient's allergies indicates:  No Known Allergies  Past Medical History:   Diagnosis Date    Sickle cell trait      Past Surgical History:   Procedure Laterality Date     SECTION N/A 2020    Procedure:  SECTION;  Surgeon: Julie R. Jeansonne, MD;  [facility]: [facility];  Service: OB/GYN;  Laterality: N/A;     History reviewed. No pertinent family history.  Social History     Tobacco Use    Smoking status: Never    Smokeless tobacco: Never   Substance Use Topics    Alcohol use: Not Currently    Drug use: Never     Review of Systems   Constitutional:  Negative for fever.   HENT:  Negative for sore throat.    Respiratory:  Negative for cough and shortness of breath.    Cardiovascular:  Negative for chest pain.   Gastrointestinal:  Negative for abdominal pain, diarrhea, nausea and vomiting.   Genitourinary:  Positive for vaginal discharge. Negative for dysuria, pelvic pain and vaginal pain.   Musculoskeletal:  Negative for back pain.   Skin:  Negative for rash.   Neurological:  Negative for weakness.   Hematological:  Does not bruise/bleed easily.     Physical Exam     Initial Vitals [22 1029]   BP Pulse Resp Temp SpO2   112/66 75 16 98.2 °F (36.8 °C) 99 %      MAP        --         Physical Exam    Constitutional: She appears well-developed and well-nourished.   Cardiovascular:  Normal rate, regular rhythm, normal heart sounds and intact distal pulses.           Pulmonary/Chest: Breath sounds normal.   Abdominal: Abdomen is soft.   Genitourinary:    Vaginal discharge (mild vaginal bleeding) present.      Genitourinary Comments: No cmt or adnexal tenderness       Neurological: She is alert and oriented to person, place, and time.   Skin: Skin is warm.       ED Course   Procedures  Labs Reviewed   VAGINAL SCREEN - Abnormal; Notable for the following components:       Result Value    Clue Cells Rare (*)     WBC - Vaginal Screen Many (*)     Bacteria - Vaginal Screen Many (*)     All other components within normal limits    Narrative:     Release to patient->Immediate   URINALYSIS, REFLEX TO URINE CULTURE - Abnormal; Notable for the following components:    Color, UA Colorless (*)     All other components within normal limits    Narrative:     Specimen Source->Urine   POCT URINE PREGNANCY - Normal   C. TRACHOMATIS/N. GONORRHOEAE BY AMP DNA    Narrative:     Sources by Resulting Lab:->[facility]  Release to patient->Immediate          Imaging Results    None          Medications   cefTRIAXone injection 250 mg (250 mg Intramuscular Given 9/7/22 1230)     Medical Decision Making:   Initial Assessment:   Patient presents for evaluation of abnormal vaginal discharge. She denies fever, nausea, or vomiting. Pelvic exam reveals mild bleeding,  no cmt, and no adnexal tenderness. UPT negative, urinalysis reveals no evidence of acute infection, and wet prep + clue cells. I suspect patient has cervicitis clinically. I do not suspect patient has PID at this time, given the above. Patient given rocephin 250mg IM . Will d/c home with flagyl and doxycycline. Patient stable for discharge.          Scribe Attestation:   Scribe #1: I performed the above scribed service and the documentation accurately  describes the services I performed. I attest to the accuracy of the note.             I, María Santos PA-C  personally performed the services described in this documentation. All medical record entries made by the scribe were at my direction and in my presence.  I have reviewed the chart and agree that the record reflects my personal performance and is accurate and complete.  Clinical Impression:   Final diagnoses:  [N72] Cervicitis (Primary)  [N76.0, B96.89] BV (bacterial vaginosis)      ED Disposition Condition    Discharge Stable          ED Prescriptions       Medication Sig Dispense Start Date End Date Auth. Provider    metroNIDAZOLE (FLAGYL) 500 MG tablet Take 1 tablet (500 mg total) by mouth every 12 (twelve) hours. for 7 days 14 tablet 9/7/2022 9/14/2022 CRYSTAL Lindsey    doxycycline (VIBRAMYCIN) 100 MG Cap Take 1 capsule (100 mg total) by mouth 2 (two) times daily. for 10 days 20 capsule 9/7/2022 9/17/2022 CRYSTAL Lindsey          Follow-up Information    None          CRYSTAL Lindsey  09/10/22 0113

## 2022-09-08 LAB
C TRACH DNA SPEC QL NAA+PROBE: NOT DETECTED
N GONORRHOEA DNA SPEC QL NAA+PROBE: NOT DETECTED

## 2023-06-20 ENCOUNTER — PATIENT MESSAGE (OUTPATIENT)
Dept: RESEARCH | Facility: HOSPITAL | Age: 28
End: 2023-06-20
Payer: MEDICAID

## 2023-06-29 NOTE — PLAN OF CARE
Call the office at [phone] or send a message via My Chart / [facility] if you have not received results in ~7 days.    Vital signs stable, afebrile; denies pain, cramping, contractions, vaginal bleeding, change in vaginal fluid; twin NSTs reviewed per MD

## 2024-01-22 NOTE — SUBJECTIVE & OBJECTIVE
----- Message from KEVON Herrera, FNP sent at 1/18/2024  5:35 PM CST -----  Hope all is well-recent admission, holiday eating , A1c went up  Instructed to go up with insulin from 8 to 12 units  Using dexcom g7 now:)  If bp looks okay we can start back jardiance 10 mg daily  It has been on hold fyi   Obstetric HPI:  Patient reports None contractions, active fetal movement x 2, absent vaginal bleeding, small loss of fluid. No further vaginal bleeding, spotting from overnight.      Objective:     Vital Signs (Most Recent):  Temp: 98.2 °F (36.8 °C) (08/08/20 0407)  Pulse: 85 (08/08/20 0407)  Resp: 16 (08/08/20 0407)  BP: (!) 94/53 (08/08/20 0407)  SpO2: 98 % (08/08/20 0406) Vital Signs (24h Range):  Temp:  [97.7 °F (36.5 °C)-98.5 °F (36.9 °C)] 98.2 °F (36.8 °C)  Pulse:  [77-97] 85  Resp:  [16-18] 16  SpO2:  [97 %-99 %] 98 %  BP: ()/(50-61) 94/53     Weight: 50.8 kg (112 lb)  Body mass index is 21.87 kg/m².    NST reactive, reassuring overnight; See note    No intake or output data in the 24 hours ending 08/08/20 0820  Significant Labs:  Recent Lab Results     None          Physical Exam:   Constitutional: She is oriented to person, place, and time. She appears well-developed and well-nourished. No distress.    HENT:   Head: Normocephalic and atraumatic.    Eyes: Pupils are equal, round, and reactive to light. EOM are normal.    Neck: Normal range of motion.    Cardiovascular: Normal rate and regular rhythm.     Pulmonary/Chest: Effort normal.   Normal work of breathing          Abdominal: Soft. She exhibits no distension. There is no abdominal tenderness. There is no rebound and no guarding.   Gravid uterus, appropriate for gestational age/twin pregnancy             Musculoskeletal: Normal range of motion. No tenderness or edema.       Neurological: She is alert and oriented to person, place, and time.    Skin: Skin is warm and dry.    Psychiatric: She has a normal mood and affect.